# Patient Record
Sex: FEMALE | Race: WHITE | Employment: OTHER | URBAN - METROPOLITAN AREA
[De-identification: names, ages, dates, MRNs, and addresses within clinical notes are randomized per-mention and may not be internally consistent; named-entity substitution may affect disease eponyms.]

---

## 2017-07-21 ENCOUNTER — HOSPITAL ENCOUNTER (OUTPATIENT)
Dept: MAMMOGRAPHY | Age: 52
Discharge: HOME | End: 2017-07-21
Admitting: INTERNAL MEDICINE

## 2017-07-21 DIAGNOSIS — Z12.31 VISIT FOR SCREENING MAMMOGRAM: ICD-10-CM

## 2017-11-07 ENCOUNTER — OFFICE VISIT (OUTPATIENT)
Dept: ORTHOPEDIC SURGERY | Age: 52
End: 2017-11-07

## 2017-11-07 VITALS
HEIGHT: 67 IN | HEART RATE: 69 BPM | SYSTOLIC BLOOD PRESSURE: 138 MMHG | BODY MASS INDEX: 26.37 KG/M2 | WEIGHT: 168 LBS | DIASTOLIC BLOOD PRESSURE: 89 MMHG

## 2017-11-07 DIAGNOSIS — M25.511 ACUTE PAIN OF RIGHT SHOULDER: Primary | ICD-10-CM

## 2017-11-07 DIAGNOSIS — M19.011 PRIMARY OSTEOARTHRITIS OF RIGHT SHOULDER: ICD-10-CM

## 2017-11-07 PROCEDURE — 99203 OFFICE O/P NEW LOW 30 MIN: CPT | Performed by: ORTHOPAEDIC SURGERY

## 2017-11-07 RX ORDER — MELOXICAM 15 MG/1
15 TABLET ORAL DAILY
Qty: 30 TABLET | Refills: 2 | Status: SHIPPED | OUTPATIENT
Start: 2017-11-07 | End: 2020-02-25

## 2017-11-07 NOTE — LETTER
Shoulder Elbow Rehabilitation Referral    Patient Name: Anamika Edge      YOB: 1965    Diagnosis: right shoulder osteoarthritis      Precautions: none       Continuous passive motion (CPM)   Elbow range of motion   Exercise in plane of scapula    Strengthening      Pulley and instruction    Home exercise program (copy to patient)    Sling when arm at risk   Sling or brace at all times    AAROM: Forward elevation to             AAROM: External rotation  To      Isometric external rotator strengthening  AAROM: internal rotation: up the back   Isometric abductor strengthening   AAROM: Internal abduction      Isometric internal rotator strengthening  AAROM: cross-body adduction             Stretching:     Strengthening:   Four quadrant (FE, ER, IR, CBA)   Sleeper stretch     Rotator cuff (ER, IR, Abd)   Forward Elevation     External Rotators      External Rotation     Internal Rotators   Internal Rotation: up/back    Abductors      Internal Rotation: supine in abduction  Flexors   Cross-body abduction     Extensors   Pendulum (FE, Abd/Add, cw/ccw)   Scapular Stabilizers    Wall-walking (FE, Abd)     Shoulder shrugs      Table slides       Rhomboid pinch   Elbow (flex, ext, pron, sup)     Lat. Pull downs      Medial epicondylitis program    Forward punch    Lateral epicondylitis program    Internal rotators      Progressive resistive exercises   Bench Press         Bench press plus  Activities:      Lateral pull-downs   Rowing      Progressive two-hand supine press   Stepper/Exercise bike    Biceps: curls/supination   Swimming   Water exercises    Modalities: PRN    Return to Sport:   Ultrasound      Plyometrics   Iontophoresis     Rhythmic stabilization   Moist heat      Core strengthening    Massage      Sports specific program:       Cryotherapy       Electrical stimulation      Paraffin   Whirlpool   TENS     Home exercise program (copy to patient).       Supervised physical therapy

## 2017-11-07 NOTE — PROGRESS NOTES
Date:  2017    Name:  Hellen Sterling  Address:  [address] [address]    :  1965      Age:   46 y.o.    SSN:  [id number]      Medical Record Number:  [id number]    Reason for Visit:    Chief Complaint    Shoulder Pain (OP/NP right shoulder)      DOS:2017     HPI: Lucia Diaz is a 46 y.o. female here today for evaluation of her right shoulder. She has a 3 month history of right shoulder pain. She denies any injury. At rest her pain is 0/10 on the pain scale and with activity is a 4/10 on the pain scale especially with internal rotation. She has seen Dr. Bhavna Ferrell in the past for right shoulder pain and reports that the symptoms she feels today are similar to her previous shoulder symptoms. In  she underwent a partial thyroidectomy. Pain Assessment  Location of Pain: Shoulder  Location Modifiers: Right  Severity of Pain: 3  Quality of Pain: Throbbing, Sharp, Dull, Aching  Duration of Pain: Persistent  Frequency of Pain: Intermittent  Aggravating Factors: Other (Comment) (lifting arm)  Limiting Behavior: Yes  Relieving Factors: Ice  Result of Injury: No  Work-Related Injury: No  Are there other pain locations you wish to document?: No  ROS: All systems reviewed on patient intake form. Pertinent items are noted in HPI.         Past Medical History:   Diagnosis Date    Cancer ([address]     Osteoarthritis         Past Surgical History:   Procedure Laterality Date    JOINT REPLACEMENT Left     partial knee    KNEE SURGERY      SKIN CANCER EXCISION      THYROID SURGERY         Family History   Problem Relation Age of Onset    Cancer Father        Social History     Social History    Marital status: Single     Spouse name: N/A    Number of children: N/A    Years of education: N/A     Social History Main Topics    Smoking status: Never Smoker    Smokeless tobacco: Never Used    Alcohol use Yes    Drug use: No    Sexual activity: Not Asked     Other Topics Concern  None     Social History Narrative    None       Current Outpatient Prescriptions   Medication Sig Dispense Refill    meloxicam (MOBIC) 15 MG tablet Take 1 tablet by mouth daily 1 po qd prn pain 30 tablet 2     No current facility-administered medications for this visit. No Known Allergies    Vital signs:  /89   Pulse 69   Ht 5' 7\" (1.702 m)   Wt 168 lb (76.2 kg)   BMI 26.31 kg/m²        Neuro: Alert & oriented x 3,  normal,  no focal deficits noted. Normal affect. Eyes: sclera clear  Ears: Normal external ear  Mouth:  No perioral lesions  Pulm: Respirations unlabored and regular  Pulse: Regular rate    Skin: Warm, well perfused        Right shoulder exam    Inspection:  No gross deformities    Palpation: Tenderness over rotator cuff footprint. No significant tenderness overlying the bicipital groove, posterior capsule, or AC joint    Active ROM: 135 forward flexion, 120 abduction, symmetric bilaterally external rotation with the elbow at the side, and internal rotation slightly less than her left shoulder and with pain anteriorly. Passive ROM: Forward elevation trending tight to approximately 150°. Abduction trending tight to approximately 135°. Strength: 5+/5 resisted external rotation with the elbow at the side, 5+/5 resisted internal rotation with the elbow at the side    Stability:  no gross instability    Neurovascular: Neurovascularly intact    Special Tests: 20cm cross arm with pain over the anterior shoulder. Positive Beckwith. Positive Neer's. Negative Katty's.           Left comparison shoulder exam    Inspection:  No gross deformities    Palpation: No significant tenderness overlying the rotator cuff footprint, bicipital groove, or AC joint    Active ROM: Full range of motion    Passive ROM: Similar    Strength: 5/5 strength testing in abduction in the scapular plane, 5/5 resisted external rotation with the elbow at the side, 5/5 resisted internal rotation with the elbow at counseling during the appointment was performed between the patient and Dr. Nanda Rankin. 11/7/2017 6:48 PM    This dictation was performed with a verbal recognition program (DRAGON) and it was checked for errors. It is possible that there are still dictated errors within this office note. If so, please bring any errors to my attention for an addendum. All efforts were made to ensure that this office note is accurate.   _________  I, Dr. Tracy Patel, personally performed the services described in this documentation as described by Yasmany Cano PA-C in my presence, and it is both accurate and complete. Jose F Rankin MD, PhD  11/7/2017

## 2017-11-14 ENCOUNTER — EVALUATION (OUTPATIENT)
Dept: PHYSICAL THERAPY | Age: 52
End: 2017-11-14

## 2019-11-05 ENCOUNTER — HOSPITAL ENCOUNTER (OUTPATIENT)
Dept: WOMENS IMAGING | Age: 54
Discharge: HOME | End: 2019-11-05
Payer: COMMERCIAL

## 2019-11-05 DIAGNOSIS — Z12.31 ENCOUNTER FOR SCREENING MAMMOGRAM FOR MALIGNANT NEOPLASM OF BREAST: ICD-10-CM

## 2019-11-05 PROCEDURE — 77067 SCR MAMMO BI INCL CAD: CPT

## 2020-02-25 ENCOUNTER — OFFICE VISIT (OUTPATIENT)
Dept: ORTHOPEDIC SURGERY | Age: 55
End: 2020-02-25
Payer: COMMERCIAL

## 2020-02-25 VITALS
BODY MASS INDEX: 27.47 KG/M2 | DIASTOLIC BLOOD PRESSURE: 88 MMHG | WEIGHT: 175 LBS | HEIGHT: 67 IN | SYSTOLIC BLOOD PRESSURE: 138 MMHG | HEART RATE: 64 BPM

## 2020-02-25 PROCEDURE — 99213 OFFICE O/P EST LOW 20 MIN: CPT | Performed by: ORTHOPAEDIC SURGERY

## 2020-02-25 RX ORDER — DIPHENHYDRAMINE HCL 25 MG
TABLET ORAL
COMMUNITY
End: 2020-05-07 | Stop reason: ALTCHOICE

## 2020-02-25 RX ORDER — CETIRIZINE HYDROCHLORIDE 10 MG/1
10 TABLET ORAL
COMMUNITY
End: 2022-01-27

## 2020-02-25 RX ORDER — EPINEPHRINE 0.3 MG/.3ML
0.3 INJECTION SUBCUTANEOUS
COMMUNITY
Start: 2019-05-21 | End: 2021-05-27

## 2020-02-25 RX ORDER — RANITIDINE 150 MG/1
150 TABLET ORAL
COMMUNITY
End: 2020-02-25

## 2020-02-25 NOTE — PROGRESS NOTES
[facility]  History and Physical  Shoulder Pain    Date:  2020    Name:  Zev Yañez  Address:  [address] [address]    :  1965      Age:   62 y.o.    SSN:        Medical Record Number:  <[id number]>    Reason for Visit:    Shoulder Pain (OP/SP RIGHT SHOULDER)      HPI:   Zev Yañez is a 62 y.o. female who presents to our office today for evaluation of her right shoulder. She saw Dr. Anshul gatica in 2017 for right shoulder osteoarthritis. She states that it is the same pain she has been experiencing, however recently it has progressively gotten worse. Since we saw her last she has been treating her OA with conservative treatment. She was prescribed one prescription of meloxicam back in  and now takes ibuprofen. Both only give temporary relief. In addition, she has been doing physical therapy with a HEP. Her pain is worse with any activity, but specifically overhead movements. She feels as if her pain is now causing her range of motion to decrease. At this point she feels her pain and loss of motion are effecting her quality of life and ability to partake in day to day activites. She would like to explore the next step in treatment. She endorses cracking. Denies numbness, tingling or new injury. Pain Assessment  Location of Pain: Shoulder  Location Modifiers: Right  Severity of Pain: 6  Quality of Pain: Aching  Duration of Pain: Persistent  Frequency of Pain: Constant  Aggravating Factors: (gen use)  Limiting Behavior: Some  Relieving Factors: Ice  Result of Injury: No  Work-Related Injury: No  Are there other pain locations you wish to document?: No    Musculoskeletal ROS: negative for - pain in right shoulder      Review of Systems:  A 14 point review of systems available in the scanned medical record as documented by the patient on 2020.   The review is negative with the exception of those things mentioned in the History of Present Illness and Past Medical History. Past Medical History:  Patient's medications, allergies, past medical, surgical, social and family histories were reviewed and updated as appropriate. Allergies:  No Known Allergies    Physical Exam:  Vitals:    02/25/20 1316   BP: 138/88   Pulse: 64     General: Bret Saldana is a healthy and well appearing 62 y.o. female who is sitting comfortably in our office in acute distress. Skin:  There are no skin lesions, cellulitis, or extreme edema. The patient has warm and well-perfused Bilateral upper extremities with brisk capillary refill. Eyes: Extra-ocular muscles intact  Mouth: Oral mucosa moist. No perioral lesions  Pulm: Respirations unlabored and regular. Neuro: Alert and oriented x3    Right Shoulder Exam:  Inspection: No gross deformities, no signs of infection. Palpation:  Tenderness over rotator cuff footprint. No significant tenderness overlying the bicipital groove, posterior capsule, or AC joint    Active Range of Motion: Forward elevation of 110 compared to 170 on the left, external rotation with elbow at the side 50 bilaterally, internal rotation to the back is L3 compared to T7 on the left    Passive Range of Motion: Passively forward elevation can be further increased to 120. Strength: External rotation with resistance with elbow at the side 5/5, internal rotation with resistance with elbow at the side 5/5      Neurovascular: Sensation to light touch is intact, no motor deficits, palpable radial pulses 2+    Additional Examinations:    Examination of the contralateral extremity does not show any tenderness, deformity or injury. There is no gross instability. There are no rashes, ulcerations or lesions.  Strength and tone are normal.      Radiographic:  3 xray views of the right  shoulder including True AP in internal and external and axillary lateral were taken in our office today reveal no fractures, dislocations, visible tumors, or signs of acute trauma. Radiographic Impression: Increased size of osteophytic lesion on the proximal humerus. Glenohumeral joint narrowing. Diffuse degenerative changes of the humeral head. Assessment:  Randi Tony is a 62 y.o. female with right shoulder glenohumeral osteoarthritis. Her symptoms have not been responding to initial conservative management. Impression:  Encounter Diagnoses   Name Primary?  Right shoulder pain, unspecified chronicity Yes    Primary osteoarthritis of right shoulder        Office Procedures:  Orders Placed This Encounter   Procedures    XR SHOULDER RIGHT (MIN 2 VIEWS)     Standing Status:   Future     Number of Occurrences:   1     Standing Expiration Date:   2/25/2021     Order Specific Question:   Reason for exam:     Answer:   pain    MRI SHOULDER RIGHT WO CONTRAST     Standing Status:   Future     Standing Expiration Date:   2/25/2021     Order Specific Question:   Reason for exam:     Answer:   MRI Proscan nky       Plan:   Andrea Jade's right glenohumeral arthritis is progressively worsening and no longer responding to conservative management. She voices concerns that it is keeping her from participating in her usual daily activities. This ultimately decreases her quality of life. A corticosteroid injection was discussed as one option for treatment to help decrease inflammation and pain. However, over the past 2 1/2 years the arthritic changes of her glenohumeral joint have progressed greatly, that she is also a candidate for right total shoulder arthroplasty. She was educated that at this point her arthritidis has progressed to the point that arthroscopy would no longer be a beneficial treatment. The patient was educated that replacement surgery would not restrict her from being able to partake in her physical activities such as swimming, tennis or golf.  Due to the amount of discomfort and limitations her arthritis causes she would like to proceed with surgical treatment. 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 Prior to surgery she will get an MRI of her right shoulder to further evaluate that state of the rotator cuff and the remaining joint. Risks, benefits and potential complications of right total shoulder arthroplasty surgery were discussed with the patient. Risks discussed include but are not limited to bleeding, infection, anesthetic risk, injury to nerves and blood vessels, deep vein thrombosis, residual stiffness and weakness, and the need for revision surgery. The patient also understands that anesthetic risks include cardiopulmonary issues, drug reactions and even death. The patient voices an understanding of the importance of physical therapy and home exercises after surgery. All questions were answered and written informed consent for surgery was obtained today. 2:50 PM    BALWINDER Rothman  Student Physician Assistant    During this examination, IKirsten PA-S, functioned as a scribe for Dr. Robyn Manuel. This dictation was performed with a verbal recognition program (DRAGON) and it was checked for errors. It is possible that there are still dictated errors within this office note. If so, please bring any errors to my attention for an addendum. All efforts were made to ensure that this office note is accurate.  _______________  I, Dr. Robyn Manuel, personally performed the services described in this documentation as described by BALWINDER Rothman in my presence, and it is both accurate and complete. Jose F Doan MD, PhD  2/25/2020

## 2020-02-26 ENCOUNTER — TELEPHONE (OUTPATIENT)
Dept: ORTHOPEDIC SURGERY | Age: 55
End: 2020-02-26

## 2020-02-26 NOTE — TELEPHONE ENCOUNTER
PT WOULD LIKE TO SPEAK TO EITHER HEMANT OR Tirso Little [address] [facility]  [phone]

## 2020-02-28 ENCOUNTER — TELEPHONE (OUTPATIENT)
Dept: ORTHOPEDIC SURGERY | Age: 55
End: 2020-02-28

## 2020-03-03 ENCOUNTER — OFFICE VISIT (OUTPATIENT)
Dept: ORTHOPEDIC SURGERY | Age: 55
End: 2020-03-03
Payer: COMMERCIAL

## 2020-03-03 VITALS
HEIGHT: 67 IN | BODY MASS INDEX: 27.47 KG/M2 | WEIGHT: 175.04 LBS | DIASTOLIC BLOOD PRESSURE: 88 MMHG | HEART RATE: 65 BPM | SYSTOLIC BLOOD PRESSURE: 142 MMHG

## 2020-03-03 PROCEDURE — 99213 OFFICE O/P EST LOW 20 MIN: CPT | Performed by: ORTHOPAEDIC SURGERY

## 2020-03-03 PROCEDURE — L3670 SO ACRO/CLAV CAN WEB PRE OTS: HCPCS | Performed by: ORTHOPAEDIC SURGERY

## 2020-03-03 NOTE — LETTER
[facility]  Surgery Precert & Billing Form:    DEMOGRAPHICS:                                                                                                       Patient Name:  Donna Smith  Patient :  1965   Patient SS#:      Patient Phone:  [phone] (home)  Alt. Patient Phone:    Patient Address:  Terell Yañez [address] [place] [id number]    PCP:  Zully Green MD  Insurance:  [employer]     DIAGNOSIS & PROCEDURE:                                                                                      Diagnosis:   Primary osteoarthritis of right shoulder  - Primary M19.011    Right shoulder pain, unspecified chronicity  M25.511        Operation: right    SURGERY  INFORMATION  Date of Surgery:   20  Location:    [facility]   Type:    Inpatient  23 hour hold:  No  Surgeon:        Jeromy Bernstein.  Cecile Cano MD         3/3/20     BILLING INFORMATION:                                                                                                Physician Procedure                                            CPT Codes                      PA, or Fellow Procedure                                      CPT Codes                      Precert information:

## 2020-03-03 NOTE — PROGRESS NOTES
12 [facility]  History and Physical  Shoulder Pain    Date:  3/3/2020    Name:  Deneen Ochoa  Address:  [place] [address] [address]    :  1965      Age:   62 y.o.    SSN:  [id number]      Medical Record Number:  <[id number]>    Reason for Visit:    Pre-op Exam (Right Shoulder - SX scheduled for 3/30/20)      HPI:   Deneen Ochoa is a 62 y.o. female who presents to our office today for follow up of the right shoulder pain. She has well known glenohumeral osteoarthritis of the right shoulder and has failed conservative management. She continues to have persistent symptoms that is affecting her overall quality of life. She is experiencing grinding and an achy pain. It also disrupts her sleep as well. She denies any new injury since her last visit. Pain Assessment  Location of Pain: Shoulder  Location Modifiers: Right  Severity of Pain: 6  Quality of Pain: Aching  Duration of Pain: Persistent  Frequency of Pain: Intermittent  Aggravating Factors: (certain movements, overhead activities)  Limiting Behavior: Yes  Relieving Factors: Rest, Ice  Work-Related Injury: No  Are there other pain locations you wish to document?: No    Review of Systems    Patient has had no medical changes since last evaluated           Review of Systems:  A 14 point review of systems available in the scanned medical record as documented by the patient. The review is negative with the exception of those things mentioned in the History of Present Illness and Past Medical History. Past Medical History:  Patient's medications, allergies, past medical, surgical, social and family histories were reviewed and updated as appropriate.     Allergies:  No Known Allergies    Physical Exam:  Vitals:    20 1557   BP: (!) 142/88   Pulse: 65     General: Deneen Ochoa is a healthy and well appearing 62 y.o. female who is sitting comfortably in our office in acute immobilization from this orthosis. The orthosis will assist in protecting the affected area, provide functional support and facilitate healing. The device was ordered and fit on 03/03/2019 . The patient was educated and fit by a healthcare professional with expert knowledge and specialization in brace application while under the direct supervision of the treating physician. Verbal and written instructions for the use of and application of this item were provided. They were instructed to contact the office immediately should the brace result in increased pain, decreased sensation, increased swelling or worsening of the condition. Plan:   We do feel that she is an excellent candidate for a right anatomic shoulder arthroplasty. Risks, benefits and potential complications of total shoulder arthroplasty surgery were discussed with the patient. Risks discussed include but are not limited to bleeding, infection, anesthetic risk, injury to nerves and blood vessels, deep vein thrombosis, residual stiffness and weakness, and the need for revision surgery. The patient also understands that anesthetic risks include cardiopulmonary issues, drug reactions and even death. The patient voices an understanding of the importance of physical therapy and home exercises after surgery. All questions were answered and written informed consent for surgery was obtained today. We will fit her with an Ultrasling today. We will want her to have an MRI of the right shoulder for surgical planning. She will also have to meet with her PCP for surgical clearance. All her questions were fully answered and we will see her at surgery. 3/3/2020  4:17 PM      Lucrecia Sanchez PA-C  Orthopaedic Sports Medicine Physician Assistant    During this examination, Lucrecia ZIMMERMAN PA-C, functioned as a scribe for Dr. Nini Lechuga. This dictation was performed with a verbal recognition program (DRAGON) and it was checked for errors.

## 2020-03-16 ENCOUNTER — TELEPHONE (OUTPATIENT)
Dept: ORTHOPEDIC SURGERY | Age: 55
End: 2020-03-16

## 2020-03-17 ENCOUNTER — TELEPHONE (OUTPATIENT)
Dept: ORTHOPEDIC SURGERY | Age: 55
End: 2020-03-17

## 2020-04-01 ENCOUNTER — TELEPHONE (OUTPATIENT)
Dept: ORTHOPEDIC SURGERY | Age: 55
End: 2020-04-01

## 2020-04-15 ENCOUNTER — TELEPHONE (OUTPATIENT)
Dept: ORTHOPEDIC SURGERY | Age: 55
End: 2020-04-15

## 2020-05-07 RX ORDER — FAMOTIDINE 20 MG/1
20 TABLET, FILM COATED ORAL DAILY
COMMUNITY
End: 2022-01-27

## 2020-05-11 ENCOUNTER — TELEPHONE (OUTPATIENT)
Dept: ORTHOPEDIC SURGERY | Age: 55
End: 2020-05-11

## 2020-05-13 ENCOUNTER — OFFICE VISIT (OUTPATIENT)
Dept: ORTHOPEDIC SURGERY | Age: 55
End: 2020-05-13
Payer: COMMERCIAL

## 2020-05-13 VITALS
HEART RATE: 95 BPM | BODY MASS INDEX: 28.24 KG/M2 | WEIGHT: 179.9 LBS | TEMPERATURE: 99.2 F | SYSTOLIC BLOOD PRESSURE: 117 MMHG | HEIGHT: 67 IN | DIASTOLIC BLOOD PRESSURE: 88 MMHG

## 2020-05-13 PROCEDURE — 99214 OFFICE O/P EST MOD 30 MIN: CPT | Performed by: ORTHOPAEDIC SURGERY

## 2020-05-15 ENCOUNTER — HOSPITAL ENCOUNTER (OUTPATIENT)
Dept: PREADMISSION TESTING | Age: 55
Discharge: HOME | End: 2020-05-19
Payer: COMMERCIAL

## 2020-05-15 ENCOUNTER — OFFICE VISIT (OUTPATIENT)
Dept: PRIMARY CARE CLINIC | Age: 55
End: 2020-05-15

## 2020-05-15 LAB
ABO/RH: NORMAL
ANION GAP SERPL CALCULATED.3IONS-SCNC: 9 MMOL/L (ref 3–16)
ANTIBODY SCREEN: NORMAL
APTT: 31.8 SEC (ref 24.2–36.2)
BACTERIA: ABNORMAL /HPF
BASOPHILS ABSOLUTE: 0 K/UL (ref 0–0.2)
BASOPHILS RELATIVE PERCENT: 0.8 %
BILIRUBIN URINE: NEGATIVE
BLOOD, URINE: ABNORMAL
BUN BLDV-MCNC: 9 MG/DL (ref 7–20)
CALCIUM SERPL-MCNC: 9.2 MG/DL (ref 8.3–10.6)
CHLORIDE BLD-SCNC: 103 MMOL/L (ref 99–110)
CLARITY: CLEAR
CO2: 27 MMOL/L (ref 21–32)
COLOR: YELLOW
CREAT SERPL-MCNC: 0.6 MG/DL (ref 0.6–1.1)
EOSINOPHILS ABSOLUTE: 0.1 K/UL (ref 0–0.6)
EOSINOPHILS RELATIVE PERCENT: 1.2 %
EPITHELIAL CELLS, UA: ABNORMAL /HPF (ref 0–5)
GFR AFRICAN AMERICAN: >60
GFR NON-AFRICAN AMERICAN: >60
GLUCOSE BLD-MCNC: 87 MG/DL (ref 70–99)
GLUCOSE URINE: NEGATIVE MG/DL
HCT VFR BLD CALC: 41.5 % (ref 36–48)
HEMOGLOBIN: 13.5 G/DL (ref 12–16)
INR BLD: 0.98 (ref 0.86–1.14)
KETONES, URINE: NEGATIVE MG/DL
LEUKOCYTE ESTERASE, URINE: NEGATIVE
LYMPHOCYTES ABSOLUTE: 1.2 K/UL (ref 1–5.1)
LYMPHOCYTES RELATIVE PERCENT: 23.3 %
MCH RBC QN AUTO: 30.6 PG (ref 26–34)
MCHC RBC AUTO-ENTMCNC: 32.5 G/DL (ref 31–36)
MCV RBC AUTO: 94.1 FL (ref 80–100)
MICROSCOPIC EXAMINATION: YES
MONOCYTES ABSOLUTE: 0.3 K/UL (ref 0–1.3)
MONOCYTES RELATIVE PERCENT: 5.2 %
NEUTROPHILS ABSOLUTE: 3.7 K/UL (ref 1.7–7.7)
NEUTROPHILS RELATIVE PERCENT: 69.5 %
NITRITE, URINE: NEGATIVE
PDW BLD-RTO: 13.4 % (ref 12.4–15.4)
PH UA: 7 (ref 5–8)
PLATELET # BLD: 242 K/UL (ref 135–450)
PMV BLD AUTO: 8.6 FL (ref 5–10.5)
POTASSIUM SERPL-SCNC: 3.8 MMOL/L (ref 3.5–5.1)
PROTEIN UA: NEGATIVE MG/DL
PROTHROMBIN TIME: 11.4 SEC (ref 10–13.2)
RBC # BLD: 4.41 M/UL (ref 4–5.2)
RBC UA: ABNORMAL /HPF (ref 0–4)
SODIUM BLD-SCNC: 139 MMOL/L (ref 136–145)
SPECIFIC GRAVITY UA: <=1.005 (ref 1–1.03)
URINE TYPE: ABNORMAL
UROBILINOGEN, URINE: 0.2 E.U./DL
WBC # BLD: 5.3 K/UL (ref 4–11)
WBC UA: ABNORMAL /HPF (ref 0–5)

## 2020-05-15 PROCEDURE — 86900 BLOOD TYPING SEROLOGIC ABO: CPT

## 2020-05-15 PROCEDURE — 85610 PROTHROMBIN TIME: CPT

## 2020-05-15 PROCEDURE — 83036 HEMOGLOBIN GLYCOSYLATED A1C: CPT

## 2020-05-15 PROCEDURE — 86850 RBC ANTIBODY SCREEN: CPT

## 2020-05-15 PROCEDURE — 81001 URINALYSIS AUTO W/SCOPE: CPT

## 2020-05-15 PROCEDURE — 85025 COMPLETE CBC W/AUTO DIFF WBC: CPT

## 2020-05-15 PROCEDURE — 87086 URINE CULTURE/COLONY COUNT: CPT

## 2020-05-15 PROCEDURE — 85730 THROMBOPLASTIN TIME PARTIAL: CPT

## 2020-05-15 PROCEDURE — 80048 BASIC METABOLIC PNL TOTAL CA: CPT

## 2020-05-15 PROCEDURE — 86901 BLOOD TYPING SEROLOGIC RH(D): CPT

## 2020-05-15 PROCEDURE — 87081 CULTURE SCREEN ONLY: CPT

## 2020-05-15 NOTE — PROGRESS NOTES
[facility] PRE-SURGICAL TESTING INSTRUCTIONS                              PRIOR TO PROCEDURE DATE:  1. Please follow any guidelines/instructions prior to your procedure as advised by your surgeon. 2. Arrange for someone to drive you home and be with you for the first 24 hours after discharge for your safety after your procedure for which you received sedation. Ensure it is someone we can share information with regarding your discharge. 3. You must contact your surgeon for instructions IF:   You are taking any blood thinners, aspirin, anti-inflammatory or vitamin E.   There is a change in your physical condition such as a cold, fever, rash, cuts, sores or any other infection, especially near your surgical site. 4. Do not drink alcohol the day before or day of your procedure. 5. A Pre-op History and Physical for surgery MUST be completed by your Physician or Urgent Care within 30 days of your procedure date. Please bring a copy with you on the day of your procedure and along with any other testing performed. THE DAY OF YOUR PROCEDURE:  1. Follow instructions for ARRIVAL TIME as DIRECTED BY YOUR SURGEON. I    2. Enter the MAIN entrance from [employer] and follow the signs to the free [employer] or [employer] parking (offered free of charge 6am-5pm). 3. Enter the Main Entrance of the hospital (do not enter from the lower level of the parking garage). Upon entrance, check in with the  at the main desk on your left. If no one is available at the desk, proceed into the [facility] Waiting Room and go through the door directly into the [facility]. There is a Check-in desk ACROSS from Room 5 (marked with a sign hanging from the ceiling). The phone number for the surgery center is [phone]. 4. Please call [phone] option #2 option #2 if you have not been preregistered yet. On the day of your procedure bring your insurance card and photo ID.  You will be

## 2020-05-16 LAB
ESTIMATED AVERAGE GLUCOSE: 99.7 MG/DL
HBA1C MFR BLD: 5.1 %
URINE CULTURE, ROUTINE: NORMAL

## 2020-05-17 LAB — MRSA CULTURE ONLY: NORMAL

## 2020-05-18 ENCOUNTER — ANESTHESIA EVENT (OUTPATIENT)
Dept: OPERATING ROOM | Age: 55
End: 2020-05-18
Payer: COMMERCIAL

## 2020-05-19 ENCOUNTER — ANESTHESIA (OUTPATIENT)
Dept: OPERATING ROOM | Age: 55
End: 2020-05-19
Payer: COMMERCIAL

## 2020-05-19 ENCOUNTER — APPOINTMENT (OUTPATIENT)
Dept: GENERAL RADIOLOGY | Age: 55
End: 2020-05-19
Attending: ORTHOPAEDIC SURGERY
Payer: COMMERCIAL

## 2020-05-19 ENCOUNTER — TELEPHONE (OUTPATIENT)
Dept: ORTHOPEDIC SURGERY | Age: 55
End: 2020-05-19

## 2020-05-19 ENCOUNTER — HOSPITAL ENCOUNTER (OUTPATIENT)
Age: 55
Setting detail: SURGERY ADMIT
Discharge: HOME | End: 2020-05-19
Attending: ORTHOPAEDIC SURGERY | Admitting: ORTHOPAEDIC SURGERY
Payer: COMMERCIAL

## 2020-05-19 VITALS
OXYGEN SATURATION: 95 % | HEIGHT: 67 IN | WEIGHT: 180 LBS | RESPIRATION RATE: 18 BRPM | BODY MASS INDEX: 28.25 KG/M2 | SYSTOLIC BLOOD PRESSURE: 130 MMHG | HEART RATE: 81 BPM | DIASTOLIC BLOOD PRESSURE: 65 MMHG | TEMPERATURE: 98.4 F

## 2020-05-19 VITALS — OXYGEN SATURATION: 98 % | DIASTOLIC BLOOD PRESSURE: 71 MMHG | TEMPERATURE: 92.5 F | SYSTOLIC BLOOD PRESSURE: 115 MMHG

## 2020-05-19 LAB
ABO/RH: NORMAL
ANTIBODY SCREEN: NORMAL
GLUCOSE BLD-MCNC: 91 MG/DL (ref 70–99)
PERFORMED ON: NORMAL
SARS-COV-2, NAAT: NOT DETECTED

## 2020-05-19 PROCEDURE — C1713 ANCHOR/SCREW BN/BN,TIS/BN: HCPCS | Performed by: ORTHOPAEDIC SURGERY

## 2020-05-19 PROCEDURE — 6360000002 HC RX W HCPCS: Performed by: ORTHOPAEDIC SURGERY

## 2020-05-19 PROCEDURE — 3600000004 HC SURGERY LEVEL 4 BASE: Performed by: ORTHOPAEDIC SURGERY

## 2020-05-19 PROCEDURE — U0002 COVID-19 LAB TEST NON-CDC: HCPCS

## 2020-05-19 PROCEDURE — 86901 BLOOD TYPING SEROLOGIC RH(D): CPT

## 2020-05-19 PROCEDURE — 86900 BLOOD TYPING SEROLOGIC ABO: CPT

## 2020-05-19 PROCEDURE — 6360000002 HC RX W HCPCS: Performed by: NURSE ANESTHETIST, CERTIFIED REGISTERED

## 2020-05-19 PROCEDURE — 73020 X-RAY EXAM OF SHOULDER: CPT

## 2020-05-19 PROCEDURE — 2580000003 HC RX 258: Performed by: ORTHOPAEDIC SURGERY

## 2020-05-19 PROCEDURE — 3700000001 HC ADD 15 MINUTES (ANESTHESIA): Performed by: ORTHOPAEDIC SURGERY

## 2020-05-19 PROCEDURE — 2500000003 HC RX 250 WO HCPCS: Performed by: ANESTHESIOLOGY

## 2020-05-19 PROCEDURE — 86850 RBC ANTIBODY SCREEN: CPT

## 2020-05-19 PROCEDURE — 3700000000 HC ANESTHESIA ATTENDED CARE: Performed by: ORTHOPAEDIC SURGERY

## 2020-05-19 PROCEDURE — 7100000001 HC PACU RECOVERY - ADDTL 15 MIN: Performed by: ORTHOPAEDIC SURGERY

## 2020-05-19 PROCEDURE — 7100000000 HC PACU RECOVERY - FIRST 15 MIN: Performed by: ORTHOPAEDIC SURGERY

## 2020-05-19 PROCEDURE — 3600000014 HC SURGERY LEVEL 4 ADDTL 15MIN: Performed by: ORTHOPAEDIC SURGERY

## 2020-05-19 PROCEDURE — C1776 JOINT DEVICE (IMPLANTABLE): HCPCS | Performed by: ORTHOPAEDIC SURGERY

## 2020-05-19 PROCEDURE — 2500000003 HC RX 250 WO HCPCS: Performed by: NURSE ANESTHETIST, CERTIFIED REGISTERED

## 2020-05-19 PROCEDURE — 64415 NJX AA&/STRD BRCH PLXS IMG: CPT | Performed by: ANESTHESIOLOGY

## 2020-05-19 PROCEDURE — 7100000011 HC PHASE II RECOVERY - ADDTL 15 MIN: Performed by: ORTHOPAEDIC SURGERY

## 2020-05-19 PROCEDURE — 2709999900 HC NON-CHARGEABLE SUPPLY: Performed by: ORTHOPAEDIC SURGERY

## 2020-05-19 PROCEDURE — 6360000002 HC RX W HCPCS: Performed by: ANESTHESIOLOGY

## 2020-05-19 PROCEDURE — 7100000010 HC PHASE II RECOVERY - FIRST 15 MIN: Performed by: ORTHOPAEDIC SURGERY

## 2020-05-19 PROCEDURE — C9290 INJ, BUPIVACAINE LIPOSOME: HCPCS | Performed by: ANESTHESIOLOGY

## 2020-05-19 DEVICE — COMPONENT TOT SHLDR CAPPED STD S3 MOD TURON: Type: IMPLANTABLE DEVICE | Site: SHOULDER | Status: FUNCTIONAL

## 2020-05-19 DEVICE — Z DUP USE 2635021 COMPONENT GLEN FIX SZ 46 ALL POLY SHLDR PEGGED ANAT E PLUS: Type: IMPLANTABLE DEVICE | Site: SHOULDER | Status: FUNCTIONAL

## 2020-05-19 DEVICE — IMPLANTABLE DEVICE: Type: IMPLANTABLE DEVICE | Site: SHOULDER | Status: FUNCTIONAL

## 2020-05-19 DEVICE — COBALT G-HV BONE CEMENT 40GM
Type: IMPLANTABLE DEVICE | Site: SHOULDER | Status: FUNCTIONAL
Brand: DJO SURGICAL

## 2020-05-19 RX ORDER — MIDAZOLAM HYDROCHLORIDE 1 MG/ML
INJECTION INTRAMUSCULAR; INTRAVENOUS
Status: COMPLETED
Start: 2020-05-19 | End: 2020-05-19

## 2020-05-19 RX ORDER — EPHEDRINE SULFATE 50 MG/ML
INJECTION, SOLUTION INTRAVENOUS PRN
Status: DISCONTINUED | OUTPATIENT
Start: 2020-05-19 | End: 2020-05-19 | Stop reason: SDUPTHER

## 2020-05-19 RX ORDER — ONDANSETRON 2 MG/ML
INJECTION INTRAMUSCULAR; INTRAVENOUS PRN
Status: DISCONTINUED | OUTPATIENT
Start: 2020-05-19 | End: 2020-05-19 | Stop reason: SDUPTHER

## 2020-05-19 RX ORDER — SODIUM CHLORIDE, SODIUM LACTATE, POTASSIUM CHLORIDE, CALCIUM CHLORIDE 600; 310; 30; 20 MG/100ML; MG/100ML; MG/100ML; MG/100ML
INJECTION, SOLUTION INTRAVENOUS CONTINUOUS
Status: DISCONTINUED | OUTPATIENT
Start: 2020-05-19 | End: 2020-05-19 | Stop reason: HOSPADM

## 2020-05-19 RX ORDER — BUPIVACAINE HYDROCHLORIDE 5 MG/ML
INJECTION, SOLUTION EPIDURAL; INTRACAUDAL
Status: COMPLETED | OUTPATIENT
Start: 2020-05-19 | End: 2020-05-19

## 2020-05-19 RX ORDER — SODIUM CHLORIDE, SODIUM LACTATE, POTASSIUM CHLORIDE, CALCIUM CHLORIDE 600; 310; 30; 20 MG/100ML; MG/100ML; MG/100ML; MG/100ML
20 INJECTION, SOLUTION INTRAVENOUS CONTINUOUS
Status: DISCONTINUED | OUTPATIENT
Start: 2020-05-19 | End: 2020-05-19 | Stop reason: HOSPADM

## 2020-05-19 RX ORDER — VANCOMYCIN HYDROCHLORIDE 1 G/20ML
INJECTION, POWDER, LYOPHILIZED, FOR SOLUTION INTRAVENOUS PRN
Status: DISCONTINUED | OUTPATIENT
Start: 2020-05-19 | End: 2020-05-19 | Stop reason: ALTCHOICE

## 2020-05-19 RX ORDER — MIDAZOLAM HYDROCHLORIDE 1 MG/ML
INJECTION INTRAMUSCULAR; INTRAVENOUS PRN
Status: DISCONTINUED | OUTPATIENT
Start: 2020-05-19 | End: 2020-05-19 | Stop reason: SDUPTHER

## 2020-05-19 RX ORDER — ASPIRIN 81 MG/1
81 TABLET ORAL 2 TIMES DAILY
Qty: 28 TABLET | Refills: 0 | Status: SHIPPED | OUTPATIENT
Start: 2020-05-19 | End: 2020-06-09

## 2020-05-19 RX ORDER — ONDANSETRON 4 MG/1
4 TABLET, FILM COATED ORAL 3 TIMES DAILY PRN
Qty: 8 TABLET | Refills: 0 | Status: SHIPPED | OUTPATIENT
Start: 2020-05-19 | End: 2020-06-09

## 2020-05-19 RX ORDER — GLYCOPYRROLATE 1 MG/5 ML
SYRINGE (ML) INTRAVENOUS PRN
Status: DISCONTINUED | OUTPATIENT
Start: 2020-05-19 | End: 2020-05-19 | Stop reason: SDUPTHER

## 2020-05-19 RX ORDER — BUPIVACAINE HYDROCHLORIDE 5 MG/ML
INJECTION, SOLUTION EPIDURAL; INTRACAUDAL
Status: COMPLETED
Start: 2020-05-19 | End: 2020-05-19

## 2020-05-19 RX ORDER — FENTANYL CITRATE 50 UG/ML
INJECTION, SOLUTION INTRAMUSCULAR; INTRAVENOUS PRN
Status: DISCONTINUED | OUTPATIENT
Start: 2020-05-19 | End: 2020-05-19 | Stop reason: SDUPTHER

## 2020-05-19 RX ORDER — OXYCODONE HYDROCHLORIDE AND ACETAMINOPHEN 5; 325 MG/1; MG/1
1 TABLET ORAL EVERY 6 HOURS PRN
Qty: 40 TABLET | Refills: 0 | Status: SHIPPED | OUTPATIENT
Start: 2020-05-19 | End: 2020-05-26

## 2020-05-19 RX ORDER — DEXAMETHASONE SODIUM PHOSPHATE 4 MG/ML
INJECTION, SOLUTION INTRA-ARTICULAR; INTRALESIONAL; INTRAMUSCULAR; INTRAVENOUS; SOFT TISSUE PRN
Status: DISCONTINUED | OUTPATIENT
Start: 2020-05-19 | End: 2020-05-19 | Stop reason: SDUPTHER

## 2020-05-19 RX ORDER — LIDOCAINE HYDROCHLORIDE 20 MG/ML
INJECTION, SOLUTION INTRAVENOUS PRN
Status: DISCONTINUED | OUTPATIENT
Start: 2020-05-19 | End: 2020-05-19 | Stop reason: SDUPTHER

## 2020-05-19 RX ORDER — CEPHALEXIN 500 MG/1
500 CAPSULE ORAL 4 TIMES DAILY
Qty: 8 CAPSULE | Refills: 0 | Status: SHIPPED | OUTPATIENT
Start: 2020-05-19 | End: 2020-05-21

## 2020-05-19 RX ORDER — ROCURONIUM BROMIDE 10 MG/ML
INJECTION, SOLUTION INTRAVENOUS PRN
Status: DISCONTINUED | OUTPATIENT
Start: 2020-05-19 | End: 2020-05-19 | Stop reason: SDUPTHER

## 2020-05-19 RX ORDER — PHENYLEPHRINE HYDROCHLORIDE 10 MG/ML
INJECTION INTRAVENOUS PRN
Status: DISCONTINUED | OUTPATIENT
Start: 2020-05-19 | End: 2020-05-19 | Stop reason: SDUPTHER

## 2020-05-19 RX ORDER — FENTANYL CITRATE 50 UG/ML
INJECTION, SOLUTION INTRAMUSCULAR; INTRAVENOUS
Status: COMPLETED
Start: 2020-05-19 | End: 2020-05-19

## 2020-05-19 RX ORDER — PROPOFOL 10 MG/ML
INJECTION, EMULSION INTRAVENOUS PRN
Status: DISCONTINUED | OUTPATIENT
Start: 2020-05-19 | End: 2020-05-19 | Stop reason: SDUPTHER

## 2020-05-19 RX ADMIN — BUPIVACAINE 20 ML: 13.3 INJECTION, SUSPENSION, LIPOSOMAL INFILTRATION at 07:13

## 2020-05-19 RX ADMIN — EPHEDRINE SULFATE 10 MG: 50 INJECTION, SOLUTION INTRAMUSCULAR; INTRAVENOUS; SUBCUTANEOUS at 07:52

## 2020-05-19 RX ADMIN — SODIUM CHLORIDE, SODIUM LACTATE, POTASSIUM CHLORIDE, AND CALCIUM CHLORIDE: 600; 310; 30; 20 INJECTION, SOLUTION INTRAVENOUS at 09:39

## 2020-05-19 RX ADMIN — EPHEDRINE SULFATE 5 MG: 50 INJECTION, SOLUTION INTRAMUSCULAR; INTRAVENOUS; SUBCUTANEOUS at 08:45

## 2020-05-19 RX ADMIN — PHENYLEPHRINE HYDROCHLORIDE 100 MCG: 10 INJECTION INTRAVENOUS at 08:36

## 2020-05-19 RX ADMIN — SUGAMMADEX 100 MG: 100 INJECTION, SOLUTION INTRAVENOUS at 10:24

## 2020-05-19 RX ADMIN — SODIUM CHLORIDE, SODIUM LACTATE, POTASSIUM CHLORIDE, AND CALCIUM CHLORIDE: 600; 310; 30; 20 INJECTION, SOLUTION INTRAVENOUS at 07:23

## 2020-05-19 RX ADMIN — Medication 0.8 MG: at 10:21

## 2020-05-19 RX ADMIN — SODIUM CHLORIDE, SODIUM LACTATE, POTASSIUM CHLORIDE, AND CALCIUM CHLORIDE 20 ML/HR: 600; 310; 30; 20 INJECTION, SOLUTION INTRAVENOUS at 06:42

## 2020-05-19 RX ADMIN — ROCURONIUM BROMIDE 25 MG: 10 INJECTION, SOLUTION INTRAVENOUS at 08:19

## 2020-05-19 RX ADMIN — EPHEDRINE SULFATE 5 MG: 50 INJECTION, SOLUTION INTRAMUSCULAR; INTRAVENOUS; SUBCUTANEOUS at 09:20

## 2020-05-19 RX ADMIN — MIDAZOLAM HYDROCHLORIDE 2 MG: 2 INJECTION, SOLUTION INTRAMUSCULAR; INTRAVENOUS at 07:10

## 2020-05-19 RX ADMIN — FENTANYL CITRATE 50 MCG: 50 INJECTION INTRAMUSCULAR; INTRAVENOUS at 07:35

## 2020-05-19 RX ADMIN — FENTANYL CITRATE 100 MCG: 50 INJECTION INTRAMUSCULAR; INTRAVENOUS at 07:10

## 2020-05-19 RX ADMIN — Medication 0.2 MG: at 07:48

## 2020-05-19 RX ADMIN — EPHEDRINE SULFATE 5 MG: 50 INJECTION, SOLUTION INTRAMUSCULAR; INTRAVENOUS; SUBCUTANEOUS at 08:57

## 2020-05-19 RX ADMIN — PHENYLEPHRINE HYDROCHLORIDE 50 MCG: 10 INJECTION INTRAVENOUS at 07:45

## 2020-05-19 RX ADMIN — PROPOFOL 200 MG: 10 INJECTION, EMULSION INTRAVENOUS at 07:36

## 2020-05-19 RX ADMIN — VANCOMYCIN HYDROCHLORIDE 1.25 G: 10 INJECTION, POWDER, LYOPHILIZED, FOR SOLUTION INTRAVENOUS at 07:30

## 2020-05-19 RX ADMIN — ROCURONIUM BROMIDE 10 MG: 10 INJECTION, SOLUTION INTRAVENOUS at 07:36

## 2020-05-19 RX ADMIN — PHENYLEPHRINE HYDROCHLORIDE 50 MCG: 10 INJECTION INTRAVENOUS at 08:57

## 2020-05-19 RX ADMIN — ROCURONIUM BROMIDE 40 MG: 10 INJECTION, SOLUTION INTRAVENOUS at 07:37

## 2020-05-19 RX ADMIN — ONDANSETRON 4 MG: 2 INJECTION INTRAMUSCULAR; INTRAVENOUS at 09:21

## 2020-05-19 RX ADMIN — PHENYLEPHRINE HYDROCHLORIDE 100 MCG: 10 INJECTION INTRAVENOUS at 08:45

## 2020-05-19 RX ADMIN — LIDOCAINE HYDROCHLORIDE 80 MG: 20 INJECTION, SOLUTION INTRAVENOUS at 07:36

## 2020-05-19 RX ADMIN — PHENYLEPHRINE HYDROCHLORIDE 100 MCG: 10 INJECTION INTRAVENOUS at 07:48

## 2020-05-19 RX ADMIN — CEFAZOLIN 1 G: 1 INJECTION, POWDER, FOR SOLUTION INTRAMUSCULAR; INTRAVENOUS at 10:51

## 2020-05-19 RX ADMIN — PROPOFOL 50 MG: 10 INJECTION, EMULSION INTRAVENOUS at 10:16

## 2020-05-19 RX ADMIN — PHENYLEPHRINE HYDROCHLORIDE 100 MCG: 10 INJECTION INTRAVENOUS at 09:20

## 2020-05-19 RX ADMIN — EPHEDRINE SULFATE 5 MG: 50 INJECTION, SOLUTION INTRAMUSCULAR; INTRAVENOUS; SUBCUTANEOUS at 08:36

## 2020-05-19 RX ADMIN — ROCURONIUM BROMIDE 25 MG: 10 INJECTION, SOLUTION INTRAVENOUS at 08:12

## 2020-05-19 RX ADMIN — BUPIVACAINE HYDROCHLORIDE 20 ML: 5 INJECTION, SOLUTION EPIDURAL; INTRACAUDAL; PERINEURAL at 07:13

## 2020-05-19 RX ADMIN — CEFTRIAXONE SODIUM 2 G: 2 INJECTION, POWDER, FOR SOLUTION INTRAMUSCULAR; INTRAVENOUS at 07:42

## 2020-05-19 RX ADMIN — DEXAMETHASONE SODIUM PHOSPHATE 4 MG: 4 INJECTION, SOLUTION INTRAMUSCULAR; INTRAVENOUS at 09:21

## 2020-05-19 RX ADMIN — PROPOFOL 50 MG: 10 INJECTION, EMULSION INTRAVENOUS at 10:11

## 2020-05-19 ASSESSMENT — PULMONARY FUNCTION TESTS
PIF_VALUE: 16
PIF_VALUE: 17
PIF_VALUE: 16
PIF_VALUE: 1
PIF_VALUE: 3
PIF_VALUE: 16
PIF_VALUE: 2
PIF_VALUE: 1
PIF_VALUE: 2
PIF_VALUE: 16
PIF_VALUE: 17
PIF_VALUE: 16
PIF_VALUE: 16
PIF_VALUE: 24
PIF_VALUE: 16
PIF_VALUE: 13
PIF_VALUE: 15
PIF_VALUE: 14
PIF_VALUE: 16
PIF_VALUE: 18
PIF_VALUE: 17
PIF_VALUE: 3
PIF_VALUE: 16
PIF_VALUE: 2
PIF_VALUE: 15
PIF_VALUE: 16
PIF_VALUE: 17
PIF_VALUE: 2
PIF_VALUE: 15
PIF_VALUE: 15
PIF_VALUE: 16
PIF_VALUE: 15
PIF_VALUE: 16
PIF_VALUE: 14
PIF_VALUE: 7
PIF_VALUE: 15
PIF_VALUE: 16
PIF_VALUE: 15
PIF_VALUE: 16
PIF_VALUE: 16
PIF_VALUE: 0
PIF_VALUE: 16
PIF_VALUE: 15
PIF_VALUE: 16
PIF_VALUE: 15
PIF_VALUE: 16
PIF_VALUE: 16
PIF_VALUE: 17
PIF_VALUE: 16
PIF_VALUE: 1
PIF_VALUE: 2
PIF_VALUE: 4
PIF_VALUE: 2
PIF_VALUE: 16
PIF_VALUE: 16
PIF_VALUE: 15
PIF_VALUE: 16
PIF_VALUE: 16
PIF_VALUE: 15
PIF_VALUE: 20
PIF_VALUE: 3
PIF_VALUE: 16
PIF_VALUE: 16
PIF_VALUE: 15
PIF_VALUE: 16
PIF_VALUE: 16
PIF_VALUE: 15
PIF_VALUE: 16
PIF_VALUE: 16
PIF_VALUE: 36
PIF_VALUE: 15
PIF_VALUE: 16
PIF_VALUE: 15
PIF_VALUE: 16
PIF_VALUE: 16
PIF_VALUE: 15
PIF_VALUE: 15
PIF_VALUE: 16
PIF_VALUE: 16
PIF_VALUE: 15
PIF_VALUE: 16
PIF_VALUE: 15
PIF_VALUE: 15
PIF_VALUE: 16
PIF_VALUE: 15
PIF_VALUE: 16
PIF_VALUE: 1
PIF_VALUE: 16
PIF_VALUE: 15
PIF_VALUE: 2
PIF_VALUE: 16
PIF_VALUE: 3
PIF_VALUE: 17
PIF_VALUE: 16
PIF_VALUE: 17
PIF_VALUE: 17
PIF_VALUE: 16
PIF_VALUE: 16
PIF_VALUE: 15
PIF_VALUE: 3
PIF_VALUE: 16
PIF_VALUE: 16
PIF_VALUE: 2
PIF_VALUE: 15
PIF_VALUE: 2
PIF_VALUE: 1
PIF_VALUE: 16
PIF_VALUE: 16
PIF_VALUE: 17
PIF_VALUE: 16
PIF_VALUE: 15
PIF_VALUE: 17
PIF_VALUE: 15
PIF_VALUE: 15
PIF_VALUE: 2
PIF_VALUE: 15
PIF_VALUE: 16
PIF_VALUE: 15
PIF_VALUE: 16
PIF_VALUE: 16
PIF_VALUE: 17
PIF_VALUE: 15
PIF_VALUE: 16
PIF_VALUE: 15
PIF_VALUE: 15
PIF_VALUE: 1
PIF_VALUE: 16
PIF_VALUE: 2
PIF_VALUE: 16
PIF_VALUE: 15
PIF_VALUE: 16
PIF_VALUE: 17
PIF_VALUE: 15
PIF_VALUE: 16
PIF_VALUE: 15
PIF_VALUE: 16
PIF_VALUE: 2
PIF_VALUE: 15
PIF_VALUE: 16
PIF_VALUE: 15
PIF_VALUE: 16
PIF_VALUE: 15
PIF_VALUE: 17

## 2020-05-19 ASSESSMENT — PAIN - FUNCTIONAL ASSESSMENT: PAIN_FUNCTIONAL_ASSESSMENT: 0-10

## 2020-05-19 ASSESSMENT — PAIN SCALES - GENERAL: PAINLEVEL_OUTOF10: 0

## 2020-05-19 NOTE — H&P
Sandro Mckay    [id number]    [facility] Same Day Surgery Update H & P  Department of General Surgery   Surgical Service   Pre-operative History and Physical  Last H & P within the last 30 days. DIAGNOSIS:   Primary osteoarthritis of right shoulder [M19.011]    PROCEDURE:  [place] RECONSTR TOTAL SHOULDER IMPLANT [17459] (RIGHT TOTAL SHOULDER REPLACEMENT)     HISTORY OF PRESENT ILLNESS:   Patient with right shoulder pain and limited ROM in the setting of arthrosis. The symptoms have been recalcitrant to conservative treatment and the patient presents today for the above procedure. Covid 19:  Patient denies fever, chills, cough or known exposure to Covid-19.      Past Medical History:        Diagnosis Date    Cancer ([facility] 2009    MELANOMA INSITU ON BACK    Osteoarthritis     PKU (phenylketonuria) ([facility] [facility]      Past Surgical History:        Procedure Laterality Date    JOINT REPLACEMENT Left     partial knee    KNEE SURGERY      SKIN CANCER EXCISION      THYROID SURGERY      1/2 THYROID FOR CYST REMOVED     Past Social History:  Social History     Socioeconomic History    Marital status: Single     Spouse name: None    Number of children: None    Years of education: None    Highest education level: None   Occupational History    None   Social Needs    Financial resource strain: None    Food insecurity     Worry: None     Inability: None    Transportation needs     Medical: None     Non-medical: None   Tobacco Use    Smoking status: Never Smoker    Smokeless tobacco: Never Used   Substance and Sexual Activity    Alcohol use: Yes     Comment: X 1 / MONTH    Drug use: No    Sexual activity: None   Lifestyle    Physical activity     Days per week: None     Minutes per session: None    Stress: None   Relationships    Social connections     Talks on phone: None     Gets together: None     Attends Judaism service: None     Active member of club or organization: None     Attends meetings of clubs

## 2020-05-19 NOTE — ANESTHESIA PRE PROCEDURE
EXCISION      THYROID SURGERY      1/2 THYROID FOR CYST REMOVED       Social History:    Social History     Tobacco Use    Smoking status: Never Smoker    Smokeless tobacco: Never Used   Substance Use Topics    Alcohol use: Yes     Comment: X 1 / MONTH                                Counseling given: Not Answered      Vital Signs (Current):   Vitals:    05/07/20 1520 05/19/20 0616   BP:  (!) 145/95   Pulse:  73   Resp:  16   Temp:  97.7 °F (36.5 °C)   SpO2:  97%   Weight: 180 lb (81.6 kg) 180 lb (81.6 kg)   Height: 5' 7\" (1.702 m) 5' 7\" (1.702 m)                                              BP Readings from Last 3 Encounters:   05/19/20 (!) 145/95   05/13/20 117/88   03/03/20 (!) 142/88       NPO Status: Time of last liquid consumption: 2100                        Time of last solid consumption: 2100                        Date of last liquid consumption: 05/18/20                        Date of last solid food consumption: 05/18/20    BMI:   Wt Readings from Last 3 Encounters:   05/19/20 180 lb (81.6 kg)   05/13/20 179 lb 14.3 oz (81.6 kg)   03/03/20 175 lb 0.7 oz (79.4 kg)     Body mass index is 28.19 kg/m².     CBC:   Lab Results   Component Value Date    WBC 5.3 05/15/2020    RBC 4.41 05/15/2020    HGB 13.5 05/15/2020    HCT 41.5 05/15/2020    MCV 94.1 05/15/2020    RDW 13.4 05/15/2020     05/15/2020       CMP:   Lab Results   Component Value Date     05/15/2020    K 3.8 05/15/2020     05/15/2020    CO2 27 05/15/2020    BUN 9 05/15/2020    CREATININE 0.6 05/15/2020    GFRAA >60 05/15/2020    LABGLOM >60 05/15/2020    GLUCOSE 87 05/15/2020    CALCIUM 9.2 05/15/2020       POC Tests:   Recent Labs     05/19/20  0630   POCGLU 91       Coags:   Lab Results   Component Value Date    PROTIME 11.4 05/15/2020    INR 0.98 05/15/2020    APTT 31.8 05/15/2020       HCG (If Applicable): No results found for: PREGTESTUR, PREGSERUM, HCG, HCGQUANT     ABGs: No results found for: PHART, PO2ART, NTI4VKR,

## 2020-05-19 NOTE — ANESTHESIA PROCEDURE NOTES
Peripheral Block    Patient location during procedure: pre-op  Start time: 5/19/2020 7:10 AM  End time: 5/19/2020 7:17 AM  Staffing  Anesthesiologist: Severa Acres, MD  Performed: anesthesiologist   Preanesthetic Checklist  Completed: patient identified, site marked, surgical consent, pre-op evaluation, timeout performed, IV checked, risks and benefits discussed, monitors and equipment checked, anesthesia consent given, oxygen available and patient being monitored  Peripheral Block  Patient position: sitting  Prep: ChloraPrep  Patient monitoring: cardiac monitor, continuous pulse ox, frequent blood pressure checks and IV access  Block type: Brachial plexus  Laterality: right  Injection technique: single-shot  Procedures: ultrasound guided  Interscalene  Provider prep: mask and sterile gloves  Needle  Needle type: short-bevel   Needle gauge: 22 G  Needle length: 8 cm  Needle localization: ultrasound guidance  Assessment  Injection assessment: negative aspiration for heme, no paresthesia on injection and local visualized surrounding nerve on ultrasound  Paresthesia pain: none  Slow fractionated injection: yes  Hemodynamics: stable  Additional Notes  Immediately prior to procedure a \"time out\" was called to verify the correct patient, allergies, laterality, procedure and equipment. Time out performed with RN. Local Anesthetic: See below    Anterior scalene and middle scalene muscles, upper, middle and lower trunks of the brachial plexus are identified, the tip of the needle and the spread of the local anesthetic around the brachial plexus are visualized. The Brachial plexus appeared to be anatomically normal and there were no abnormal pathologically findings seen. Right Ultrasound-Guided Interscalene Brachial Plexus Block    Indication: Postoperative analgesia upon request of the attending surgeon. Procedure: Informed consent obtained and pre-procedural timeout procedure performed.   Patient supine in

## 2020-05-19 NOTE — PROGRESS NOTES
Ambulatory Surgery/Procedure Discharge Note    Vitals:    05/19/20 1204   BP: 130/65   Pulse: 81   Resp: 18   Temp: 98.4 °F (36.9 °C)   SpO2: 95%       In: 1210 [P.O.:210; I.V.:1000]  Out: 200     Restroom use offered before discharge. Yes/steady gait walking to BR and voided    Pain assessment:  {Pain assessment  Pain Level: 0 scant pain    Right arm immobilizer on. Clean right shoulder dressing. Has movement and sensation in warm pink right fingers. Stable gait to BR and voided without hesitation. Taking soda no nausea. Discharge instructions reviewed. Patient and sister on phone educated, using the teach back method, about follow up instructions and discharge instructions. A completed copy of the AVS instructions given to patient and all questions answered. Return demo of IS with adequate demo. Patient discharged to home/self care.  Patient discharged via wheel chair by me to waiting family/S.O.       5/19/2020 1:02 PM
Hospitalist will not see patient because they are not inpatient. Dr. Zeina Sherwood made aware. Patient is stable and comfortable. Meets pacu dc criteria.
PACU Transfer to [facility]    Vitals:    05/19/20 1130   BP: 134/77   Pulse: 80   Resp: 14   Temp: 98 °F (36.7 °C)   SpO2: 95%         Intake/Output Summary (Last 24 hours) at 5/19/2020 1134  Last data filed at 5/19/2020 0939  Gross per 24 hour   Intake 1000 ml   Output 200 ml   Net 800 ml       Pain assessment:  none  Pain Level: 0    Patient transferred to care of [facility] RN.    5/19/2020 11:34 AM
[facility] [employer] / [facility] ([facility]) [address], [address]    Acknowledgment of Informed Consent for Surgical or Medical Procedure and Sedation  I agree to allow doctor(s) Nam Kim and his/her associates or assistants, including residents and/or other qualified medical practitioner to perform the following medical treatment or procedure and to administer or direct the administration of sedation as necessary:  Procedure(s): RIGHT TOTAL SHOULDER REPLACEMENT  My doctor has explained the following regarding the proposed procedure:   the explanation of the procedure   the benefits of the procedure   the potential problems that might occur during recuperation   the risks and side effects of the procedure which could include but are not limited to severe blood loss, infection, stroke or death   the benefits, risks and side effect of alternative procedures including the consequences of declining this procedure or any alternative procedures   the likelihood of achieving satisfactory results. I acknowledge no guarantee or assurance has been made to me regarding the results. I understand that during the course of this treatment/procedure, unforeseen conditions can occur which require an additional or different procedure. I agree to allow my physician or assistants to perform such extension of the original procedure as they may find necessary. I understand that sedation will often result in temporary impairment of memory and fine motor skills and that sedation can occasionally progress to a state of deep sedation or general anesthesia. I understand the risks of anesthesia for surgery include, but are not limited to, sore throat, hoarseness, injury to face, mouth, or teeth; nausea; headache; injury to blood vessels or nerves; death, brain damage, or paralysis.     I understand that if I have a Limitation of Treatment order in effect during my hospitalization, the order may or may not be
potential side effects. 2. For comfort and safety, arrange to have someone at home with you for the first 24 hours after discharge. 3. You and your family will be given written instructions about your diet, activity, dressing care, medications, and return visits. 4. Once at home, should issues with nausea, pain, or bleeding occur, or should you notice any signs of infection, you should call your surgeon. 5. Always clean your hands before and after caring for your wound. Do not let your family touch your surgery site without cleaning their hands. 6. Narcotic pain medications can cause significant constipation. You may want to add a stool softener to your postoperative medication schedule or speak to your surgeon on how best to manage this SIDE EFFECT. SPECIAL INSTRUCTIONS     Thank you for allowing us to care for you. We strive to exceed your expectations in the delivery of care and service provided to you and your family. If you need to contact us for any reason, please call us at [phone]    Instructions reviewed with patient during preadmission testing phone interview. Zehra Cope. 5/7/2020 .3:28 PM      ADDITIONAL EDUCATIONAL INFORMATION REVIEWED PER PHONE WITH YOU AND/OR YOUR FAMILY:  No Bring a urine sample on day of surgery  Yes Pain Goal-Taking Control of Your Pain  Yes FAQs about Surgical Site Infections  No Hibiclens® Bathing Instructions   No Antibacterial Soap  Yes Louis® Wipes Bathing Instructions (Obtained from: [email or web address] pdf )  No Incentive Spirometer Education  No Other

## 2020-05-19 NOTE — BRIEF OP NOTE
Brief Postoperative Note      Patient: Cheri Garcia  YOB: 1965  MRN: [id number]    Date of Procedure: 5/19/2020    Pre-Op Diagnosis: Primary osteoarthritis of right shoulder [M19.011]    Post-Op Diagnosis: Same       Procedure(s):  RIGHT SHOULDER ARTHROTOMY, REMOVAL OF LOOSE BODIES, OPEN BICEPS TENODESIS, ANATOMIC TOTAL SHOULDER ARTHROPLASTY    Surgeon(s):  Jenne Litten, MD Malcolm Rummer, DO    Assistant:  Surgical Assistant: Alondra Cavazos RN    Anesthesia: General    Estimated Blood Loss (mL): less than 533     Complications: None    Specimens:   * No specimens in log *    Implants:  * No implants in log *      Drains: * No LDAs found *    Findings: see operative report    Electronically signed by Vanda Johnson DO on 5/19/2020 at 9:47 AM

## 2020-05-19 NOTE — TELEPHONE ENCOUNTER
Patient would like a call back today, has a question in regards to the stretching machine for her arm. Also wants to know if she needs to wear compression stocking.  Ph# [phone]

## 2020-05-20 NOTE — OP NOTE
with longstanding history of right shoulder pain and stiffness. She had end-stage glenohumeral arthritis. She had failed multimodal  conservative treatments. She wished definitive treatments. We offered  anatomic total shoulder replacement. An MRI confirmed an intact rotator  cuff. She understood the potential risks and benefits of surgery, gave  her informed consent, and wished to proceed. She understood risks such  as bleeding, infection, anesthetic risks, injury to nerves and blood  vessels, stiffness, weakness, incomplete pain relief, and need for  further surgery. She understood all of this. DESCRIPTION OF PROCEDURE:  On the date of procedure, brought back to the  operating room and placed supine on the operating table. General  anesthesia was established. Preoperative antibiotics were given in the  holding area. Under anesthesia, exam was carried out with the findings  as noted above. The patient was positioned using a Tenet beach-chair  positioner in semi-sitting position. Trunk was elevated to 45 degrees. All pressure points were padded. The patient's right shoulder and arm  were prepped and draped in a standard manner for shoulder replacement  surgery. We used [place] to cover the operative field. We used a Tenet  Spider arm tinsley to facilitate arm position. All members of the  surgical team wore body exhaust suits. We approached her shoulder through a roughly 10-cm oblique incision from  the coracoid coursing down towards the level of the deltoid tubercle. The incision was carried out with a skin knife through the skin and  subcutaneous tissue. Electrocautery was used to establish hemostasis. Gelpi retractor was placed. Deltopectoral interval was identified and  developed with a combination of blunt and sharp dissection. The  clavipectoral fascia was thickened. A portion of this was excised. We  released the subdeltoid and subacromial adhesions.   We opened up the  transverse humeral ligament with cautery. We removed all the  tenosynovitis. We tenodesed the biceps in situ to the upper rolled  border of the pectoralis major tendon. This was done using multiple #2  Ethibond figure-of-eight stitches. Tendon proximal to this was excised. We took down the subscapularis sharply off the lesser tuberosity. We  released the inferior capsule and exposed the large osteophytes with a  Hohmann retractor. We released the capsule all the way around the  osteophytes. We dislocated the humeral head. We used a 1/2-inch curved  osteotome to resect the osteophytes and these were removed with a  rongeur. We carried out our removal of osteophytes all the way  posteromedial.  We placed our cutting guide in appropriate height and  retroversion and pinned the guide in place. We used an oscillating saw  to resect the humeral head. We took a generous cut right off the  articular margin in the rotator cuff. We removed the humeral head off  to back table and later harvested cancellous bone from that head to  enhance our press-fit. We prepared the humerus. We used a canal finder to find the canal.   Bone quality was soft. We were able to broach. So, we were able to  ream up to size 16 and broached up to size 16 broach. We inserted our  broach and seated it. We had good coverage. We removed additional  osteophytes posteromedial.  We chose a 46 x 18 offset humeral head and  this was placed in appropriate offset position and had excellent  coverage. We removed the trial head and neck and left the broach in  place. Our attention next turned towards the glenoid. We excised the anterior  capsule and removed all the rotator interval tissue. We used a Fukuda  retractor to retract the humeral head out of the way. We used Hohmann  retractor around the glenoid. We excised the labrum and released the  anterior and inferior capsule. We had good en face view of the glenoid.   Biceps

## 2020-05-22 ENCOUNTER — EVALUATION (OUTPATIENT)
Dept: PHYSICAL THERAPY | Age: 55
End: 2020-05-22
Payer: COMMERCIAL

## 2020-05-22 PROCEDURE — 97110 THERAPEUTIC EXERCISES: CPT | Performed by: PHYSICAL THERAPIST

## 2020-05-22 PROCEDURE — 97161 PT EVAL LOW COMPLEX 20 MIN: CPT | Performed by: PHYSICAL THERAPIST

## 2020-05-22 PROCEDURE — 97112 NEUROMUSCULAR REEDUCATION: CPT | Performed by: PHYSICAL THERAPIST

## 2020-05-22 NOTE — PROGRESS NOTES
[facility]   Phone: [phone]    Fax: [phone]      Physical Therapy Treatment Note/ Progress Report:           Date:  2020    Patient Name:  Georgiana Loyola    :  1965  MRN: <[id number]>  Restrictions/Precautions:    Medical/Treatment Diagnosis Information:  · Diagnosis: R shoulder OA s/p TSA  · DOS: 8131  Insurance/Certification information:  PT Insurance Information: BCBS  Physician Information:  Referring Practitioner: Dr. Kristie Akers  Has the plan of care been signed (Y/N):        []  Yes  [x]  No     Date of Patient follow up with Physician: 2020      Is this a Progress Report:     [x]  Yes  []  No        If Yes:  Date Range for reporting period:  Beginnin2020  Endin2020    Progress report will be due (10 Rx or 30 days whichever is less):       Recertification will be due (POC Duration  / 90 days whichever is less): 2020        Visit # Insurance Allowable Auth Required   1 90 (hard max) []  Yes [x]  No        Functional Scale: UEFI: 10/80, 87.5% limitaiton   Date assessed: 2020     Latex Allergy:  [x]NO      []YES  Preferred Language for Healthcare:   [x]English       []other:      Pain level:  0-4/10     SUBJECTIVE:  See eval    OBJECTIVE: See eval       ROM PROM AROM  Comment:  2020    L R L R    Flexion  72° (table slide) 160° NT    Abduction   168° NT    ER   T3 NT    IR   T9 NT    Elbow Flex        Elbow Ext            Strength: deferred secondary to surgery L R Comment:  2020       Special Tests Results/Comment: deferred secondary to surgery:  2020        RESTRICTIONS/PRECAUTIONS: R TSA    Exercises/Interventions:     Therapeutic Ex (93405) Sets/sec Reps Notes/CUES   AD UE BIKE            STRETCHING/ROM      Pulleys      Table Slides 10\" x10 Flex to max of 90°   Wand      UE Ramona      Pendulum      Doorway      Wall Slides      CBA      TP BB      Sleeper       Elbow PROM  x20    Wrist 4 way  x20          ISOMETRICS      Gripping 2'     Shrugs 3-5\" x20    Retraction 3-5\" x20    Abduction      Flexion      Internal Rotation      External Rotation            STRENGTHENING-PREs      Flexion      Abduction      Internal Rotation      External Rotation      Shrugs      Biceps      Triceps      Retraction      Extension      Horizontal Abduction in ER      Serratus                        THERABANDS/CABLE COLUMN      Rows      Lats      Extension      Internal Rotation      External Rotation      Biceps      Triceps      PNF                                    Manual Intervention (46803)      Scar Massage      STM      Hawkgrips      GH joint mobilizations      Foamroll                  NMR re-education (42972)   CUES NEEDED   Plyoback      Therabar oscillations      Body Blade       Rhythmic Stabilization      Ball on the wall                              Therapeutic Activity (73668)      Core training      Swiss ball activities      Education                              Therapeutic Exercise and NMR EXR  [x] (95282) Provided verbal/tactile cueing for activities related to strengthening, flexibility, endurance, ROM  for improvements in scapular, scapulothoracic and UE control with self care, reaching, carrying, lifting, house/yardwork, driving/computer work. [x] (51183) Provided verbal/tactile cueing for activities related to improving balance, coordination, kinesthetic sense, posture, motor skill, proprioception  to assist with  scapular, scapulothoracic and UE control with self care, reaching, carrying, lifting, house/yardwork, driving/computer work. Therapeutic Activities:    [] (47589 or 37985) Provided verbal/tactile cueing for activities related to improving balance, coordination, kinesthetic sense, posture, motor skill, proprioception and motor activation to allow for proper function of scapular, scapulothoracic and UE control with self care, carrying, lifting, driving/computer work. Home Exercise Program:    [x] (33037) Reviewed/Progressed HEP activities related to strengthening, flexibility, endurance, ROM of scapular, scapulothoracic and UE control with self care, reaching, carrying, lifting, house/yardwork, driving/computer work  [] (98949) Reviewed/Progressed HEP activities related to improving balance, coordination, kinesthetic sense, posture, motor skill, proprioception of scapular, scapulothoracic and UE control with self care, reaching, carrying, lifting, house/yardwork, driving/computer work      Manual Treatments:  PROM / STM / Oscillations-Mobs:  G-I, II, III, IV (PA's, Inf., Post.)  [] (44259) Provided manual therapy to mobilize soft tissue/joints of cervical/CT, scapular GHJ and UE for the purpose of modulating pain, promoting relaxation,  increasing ROM, reducing/eliminating soft tissue swelling/inflammation/restriction, improving soft tissue extensibility and allowing for proper ROM for normal function with self care, reaching, carrying, lifting, house/yardwork, driving/computer work    Modalities:     [] GAME READY (VASO)- for significant edema, swelling, pain control. [x] CP to R shoulder x10'    Charges:  Timed Code Treatment Minutes: 30   Total Treatment Minutes: 50      [x] EVAL (LOW) 20270 (typically 20 minutes face-to-face)  [] EVAL (MOD) 25824 (typically 30 minutes face-to-face)  [] EVAL (HIGH) 52447 (typically 45 minutes face-to-face)  [] RE-EVAL     [x] KM(03880) x  1   [] IONTO  [x] NMR (65419) x1     [] VASO  [] Manual (14220) x     [] Other:  [] TA x      [] Mech Traction (40890)  [] ES(attended) (50620)      [] ES (un) (16872):         ASSESSMENT:  See eval      GOALS:  Patient stated goal: Able to regain ROM and perform all tasks without pain  [] Progressing: [] Met: [] Not Met: [] Adjusted    Therapist goals for Patient:   Short Term Goals: To be achieved in: 2 weeks  1. Independent in HEP and progression per patient tolerance, in order to prevent re-injury. complete treatment  [] Patient limited by fatigue  [] Patient limited by pain    [] Patient limited by other medical complications  [] Other:           PLAN: See eval  [] Continue per plan of care [] Alter current plan   [x] Plan of care initiated [] Hold pending MD visit [] Discharge      Electronically signed by:  Poly Urban, PT, DPT, OMT-C      [place] PT license: [id number]  [place] PT license: [id number]      Note: If patient does not return for scheduled/ recommended follow up visits, this note will serve as a discharge from care along with most recent update on progress.

## 2020-05-22 NOTE — PROGRESS NOTES
completed on Tuesday 5/19/2020. She notes that pain has been pretty well managed, and she has been using her CPM 3x/day. She notes that she has been able to go without pain medication and no tylenol, and has only been taking aspirin per MD instruction. She notes that she has been sleeping in bed with pillows propping her up which seems to be working better.     Relevant Medical History:OA, PKU, Hx of thyroid sx, hx of skin cancer  Functional Disability Index:  UEFI: 10/80, 87.5% limitation    Pain Scale: 0-4/10  Easing factors: ice, rest, CPM chair  Provocative factors: transferring sit to stand and supine to sit    Type: []Constant   [x]Intermittent  []Radiating []Localized []other:     Numbness/Tingling: Patient denies    Occupation/School: Patient is employed full time as an  but is currently off work    Living Status/Prior Level of Function: Independent with ADLs and IADLs,   Patient is a L handed    OBJECTIVE:        ROM PROM AROM  Comment:  5/22/2020    L R L R    Flexion  72° (table slide) 160° NT    Abduction   168° NT    ER   T3 NT    IR   T9 NT    Elbow Flex        Elbow Ext            Strength: deferred secondary to surgery L R Comment:  5/22/2020       Special Tests Results/Comment: deferred secondary to surgery:  5/22/2020       Reflexes/Sensation:  Formal assessment deferred secondary to surgery   []Dermatomes/Myotomes intact    []Reflexes equal and normal bilaterally   []Other:    Joint mobility: Formal assessment deferred secondary to surgery   []Normal    []Hypo   []Hyper    Palpation: +2 TTP globally in R shoulder    Functional Mobility/Transfers: Patient is limited with all ADLs secondary to postop restrictions of R UE (no active motion)     Posture: forward head and rounded shoulders    Bandages/Dressings/Incisions: incisions are clean and dry with no signs of infection    Gait: (include devices/WB status): decreased trunk rotation and no reciprocal arm swing secondary to presence of sling postoperatively    Orthopedic Special Tests: see above                       [x] Patient history, allergies, meds reviewed. Medical chart reviewed. See intake form. Review Of Systems (ROS):  [x]Performed Review of systems (Integumentary, CardioPulmonary, Neurological) by intake and observation. Intake form has been scanned into medical record. Patient has been instructed to contact their primary care physician regarding ROS issues if not already being addressed at this time.       Co-morbidities/Complexities (which will affect course of rehabilitation):   []None           Arthritic conditions   []Rheumatoid arthritis (M05.9)  [x]Osteoarthritis (M19.91)   Cardiovascular conditions   []Hypertension (I10)  []Hyperlipidemia (E78.5)  []Angina pectoris (I20)  []Atherosclerosis (I70)   Musculoskeletal conditions   []Disc pathology   []Congenital spine pathologies   []Prior surgical intervention  []Osteoporosis (M81.8)  []Osteopenia (M85.8)   Endocrine conditions   []Hypothyroid (E03.9)  []Hyperthyroid Gastrointestinal conditions   []Constipation (G72.18)   Metabolic conditions   []Morbid obesity (E66.01)  []Diabetes type 1(E10.65) or 2 (E11.65)   []Neuropathy (G60.9)     Pulmonary conditions   []Asthma (J45)  []Coughing   []COPD (J44.9)   Psychological Disorders  []Anxiety (F41.9)  []Depression (F32.9)   []Other:   [x]Other: PKU, Hx of skin cancer, Hx of thyroid surgery         Barriers to/and or personal factors that will affect rehab potential:              []Age  []Sex              [x]Motivation/Lack of Motivation: Patient demonstrates high motivation                        []Co-Morbidities              []Cognitive Function, education/learning barriers              []Environmental, home barriers              []profession/work barriers  [x]past PT/medical experience: Patient has had prior skilled PT experience and demonstrates good understanding of POC  []other:  Justification: see above     Falls

## 2020-05-26 ENCOUNTER — OFFICE VISIT (OUTPATIENT)
Dept: ORTHOPEDIC SURGERY | Age: 55
End: 2020-05-26

## 2020-05-26 VITALS — HEIGHT: 67 IN | BODY MASS INDEX: 28.24 KG/M2 | WEIGHT: 179.9 LBS

## 2020-05-26 PROCEDURE — 99024 POSTOP FOLLOW-UP VISIT: CPT | Performed by: ORTHOPAEDIC SURGERY

## 2020-05-26 NOTE — PROGRESS NOTES
History of Present Illness:  Ji Farr is a 62 y.o. female who presents for a post operative visit. The patient underwent a right anatomic shoulder arthroplasty on 5/19/2020 by Dr. Kenya Arredondo. Overall she is doing okay and feels that their pain is well controlled with current pain medications. She has been compliant with wearing the UltraSling brace at all times. She reports that her pain is well controlled and is improving right now. Her surgery was done on an outpatient basis and she reports that everything went well. She has the CPM machine and feels that helps keep her pain levels down. The patient denies any fevers, chills, numbness, tingling, and shortness of breath. Medical History:  Patient's medications, allergies, past medical, surgical, social and family histories were reviewed and updated as appropriate. Review of Systems    Patient has had no medical changes since last evaluated      Review of Systems  A 14 point review of systems was completed by the patient is available in the media section of the scanned medical record and was reviewed on 5/26/2020. Vital Signs:  Vitals:       General/Appearance: Alert and oriented and in no apparent distress. Skin:  There are no skin lesions, cellulitis, or extreme edema. The patient has warm and well-perfused Bilateral upper extremities with brisk capillary refill. Right Shoulder Exam:    Inspection: Right shoulder incision that is clean, dry and intact and well approximated. The Prineo dressing is still in place. Mild ecchymosis and swelling are present as can be expected. There is no erythema, drainage or other signs of infection    Palpation:  No crepitus to gentle motion    Active Range of Motion: Deferred    Passive Range of Motion:  She as FE of 30 and ER of 0. Strength:  Deferred    Special Tests:  Deferred.     Neurovascular: Sensation to light touch is intact, no motor deficits, palpable radial pulses 2+    Radiology:

## 2020-05-28 ENCOUNTER — TREATMENT (OUTPATIENT)
Dept: PHYSICAL THERAPY | Age: 55
End: 2020-05-28
Payer: COMMERCIAL

## 2020-05-28 PROCEDURE — 97110 THERAPEUTIC EXERCISES: CPT | Performed by: PHYSICAL THERAPIST

## 2020-05-28 PROCEDURE — 97140 MANUAL THERAPY 1/> REGIONS: CPT | Performed by: PHYSICAL THERAPIST

## 2020-06-04 ENCOUNTER — TREATMENT (OUTPATIENT)
Dept: PHYSICAL THERAPY | Age: 55
End: 2020-06-04
Payer: COMMERCIAL

## 2020-06-04 PROCEDURE — 97140 MANUAL THERAPY 1/> REGIONS: CPT | Performed by: PHYSICAL THERAPIST

## 2020-06-04 PROCEDURE — 97110 THERAPEUTIC EXERCISES: CPT | Performed by: PHYSICAL THERAPIST

## 2020-06-04 PROCEDURE — 97112 NEUROMUSCULAR REEDUCATION: CPT | Performed by: PHYSICAL THERAPIST

## 2020-06-04 NOTE — PROGRESS NOTES
[] IONTO  [x] NMR (91803) x 1     [] VASO  [x] Manual (64732) x1     [] Other:  [] TA x      [] Mech Traction (96660)  [] ES(attended) (74514)      [] ES (un) (10182):         ASSESSMENT:  Pendulums and table walk backs added to patient's program today. She requires minimal VCs to keep pendulums passive for R shoulder ROM, but overall has good form. She exhibits significant muscle tightness in the R UT noted during manual STM by PT. She reports decreased stiffness following manual STM today. Patient was provided with updated HEP today, see media in chart. GOALS:  Patient stated goal: Able to regain ROM and perform all tasks without pain  [] Progressing: [] Met: [] Not Met: [] Adjusted    Therapist goals for Patient:   Short Term Goals: To be achieved in: 2 weeks  1. Independent in HEP and progression per patient tolerance, in order to prevent re-injury. [] Progressing: [] Met: [] Not Met: [] Adjusted  2. Patient will have a decrease in pain to facilitate improvement in movement, function, and ADLs as indicated by Functional Deficits. [] Progressing: [] Met: [] Not Met: [] Adjusted    Long Term Goals: To be achieved in: 16 weeks  1. Disability index score of 15% or less for the UEFI to assist with reaching prior level of function. [] Progressing: [] Met: [] Not Met: [] Adjusted  2. Patient will demonstrate increased R shoulder flexion, abduction, ER, and IR AROM to >155°, >155°, T2, and T10 respectively to allow for proper joint functioning as indicated by patients Functional Deficits. [] Progressing: [] Met: [] Not Met: [] Adjusted  3. Patient will demonstrate an increase in R shoulder strength in all planes  to 4+/5 or greater to allow for proper functional mobility as indicated by patients Functional Deficits. [] Progressing: [] Met: [] Not Met: [] Adjusted  4. Patient will return to all functional activities without increased symptoms or restriction.    [] Progressing: [] Met: [] Not Met: []

## 2020-06-09 ENCOUNTER — OFFICE VISIT (OUTPATIENT)
Dept: ORTHOPEDIC SURGERY | Age: 55
End: 2020-06-09

## 2020-06-09 VITALS — HEIGHT: 67 IN | TEMPERATURE: 97.5 F | BODY MASS INDEX: 28.24 KG/M2 | WEIGHT: 179.9 LBS

## 2020-06-09 PROCEDURE — 99024 POSTOP FOLLOW-UP VISIT: CPT | Performed by: ORTHOPAEDIC SURGERY

## 2020-06-09 RX ORDER — ALBUTEROL SULFATE 90 UG/1
AEROSOL, METERED RESPIRATORY (INHALATION)
COMMUNITY
Start: 2020-05-22 | End: 2020-06-09

## 2020-06-09 NOTE — PROGRESS NOTES
Patient has had no medical changes since last evaluated
She is doing well. Impression:  Encounter Diagnoses   Name Primary?  Status post total replacement of right shoulder Yes    History of right shoulder replacement     Primary osteoarthritis of right shoulder        Office Procedures:  Orders Placed This Encounter   Procedures    Ambulatory referral to Physical Therapy     Referral Priority:   Routine     Referral Type:   Eval and Treat     Referral Reason:   Specialty Services Required     Requested Specialty:   Physical Therapy     Number of Visits Requested:   1       Treatment Plan:    Overall the patient is doing well. The pain is well-controlled. We recommend that she wear the UltraSling brace mainly when in crowds. She may discontinue it at home. She may resume driving her car without a sling. She may go to therapy twice a week now. We will give a print out of their physical therapy order. All of her questions were fully answered today. We would like to see her back in 2 weeks for follow-up visit. 1:55 PM      Kelly Briggs PA-C  Orthopaedic Sports Medicine Physician Assistant    During this examination, Kelly ZIMMERMAN PA-C, functioned as a scribe for Dr. Kenya Arredondo. This dictation was performed with a verbal recognition program (DRAGON) and it was checked for errors. It is possible that there are still dictated errors within this office note. If so, please bring any errors to my attention for an addendum. All efforts were made to ensure that this office note is accurate.  __________________  I, Dr. Kenya Arredondo, personally performed the services described in this documentation as described by Kelly Briggs PA-C in my presence, and it is both accurate and complete. Jose F Chapin MD, PhD  6/9/2020

## 2020-06-09 NOTE — LETTER
Physical Therapy Rehabilitation Referral    Patient Name:  Joselyn Templeton      YOB: 1965    Diagnosis:    1. Status post total replacement of right shoulder    2. History of right shoulder replacement    3. Primary osteoarthritis of right shoulder        Precautions:  Subscapularis precautions. [x] Evaluate and Treat    Post Op Instructions:  [x] Continuous passive motion (CPM) [x] Elbow ROM  [x] Exercise in plane of scapula  [x]  Strengthening     [] Pulley and instruction   [x] Home exercise program (copy to patient)   [x] Sling when arm at risk  [] Sling or brace at all times   [x] AAROM: Forward elevation to  140            [x] AAROM: External rotation  To  40    [] Isometric external rotator strengthening [] AAROM: internal rotation: up the back  [x] Isometric abductor strengthening  [] AAROM: Internal abduction   [] Isometric internal rotator strengthening [] AAROM: cross-body adduction             Stretching:     Strengthening:  [] Four quadrant (FE, ER, IR, CBA)  [] Rotator cuff (ER, IR, Abd)  [x] Forward Elevation    [] External Rotators     [x] External Rotation    [] Internal Rotators  [] Internal Rotation: up/back   [] Abductors     [] Internal Rotation: supine in abduction  [] Sleeper Stretch    [] Flexors  [] Cross-body abduction    [] Extensors  [x] Pendulum (FE, Abd/Add, cw/ccw)  [x] Scapular Stabilizers   [x] Wall-walking (FE, Abd)        [x] Shoulder shrugs     [x] Table slides (FE)                [x] Rhomboid pinch  [] Elbow (flex, ext, pron, sup)        [] Lat.  Pull downs     [] Medial epicondylitis program       [] Forward punch   [] Lateral epicondylitis program       [] Internal rotators     [] Progressive resistive exercises  [] Bench Press        [] Bench press plus  Activities:     [] Lateral pull-downs  [] Rowing     [] Progressive two-hand supine press  [] Stepper/Exercise bike   [] Biceps: curls/supination  [] Swimming  [] Water exercises

## 2020-06-11 ENCOUNTER — TREATMENT (OUTPATIENT)
Dept: PHYSICAL THERAPY | Age: 55
End: 2020-06-11
Payer: COMMERCIAL

## 2020-06-11 PROCEDURE — 97110 THERAPEUTIC EXERCISES: CPT | Performed by: PHYSICAL THERAPIST

## 2020-06-11 PROCEDURE — 97112 NEUROMUSCULAR REEDUCATION: CPT | Performed by: PHYSICAL THERAPIST

## 2020-06-11 PROCEDURE — 97140 MANUAL THERAPY 1/> REGIONS: CPT | Performed by: PHYSICAL THERAPIST

## 2020-06-11 NOTE — PROGRESS NOTES
[facility]   Phone: [phone]    Fax: [phone]      Physical Therapy Treatment Note/ Progress Report:           Date:  2020    Patient Name:  Manju Cervantes    :  1965  MRN: <[id number]>  Restrictions/Precautions:    Medical/Treatment Diagnosis Information:  · Diagnosis: R shoulder OA s/p TSA  · DOS:   Insurance/Certification information:  PT Insurance Information: BCBS  Physician Information:  Referring Practitioner: Dr. Ammy Luevano  Has the plan of care been signed (Y/N):        [x]  Yes  []  No     Date of Patient follow up with Physician: 2020      Is this a Progress Report:     []  Yes  [x]  No        If Yes:  Date Range for reporting period:  Beginnin2020  Endin2020    Progress report will be due (10 Rx or 30 days whichever is less): 3/34/9743      Recertification will be due (POC Duration  / 90 days whichever is less): 2020        Visit # Insurance Allowable Auth Required   4 90 (hard max) []  Yes [x]  No        Functional Scale: UEFI: 10/80, 87.5% limitaiton   Date assessed: 2020     Latex Allergy:  [x]NO      []YES  Preferred Language for Healthcare:   [x]English       []other:      Pain level:  0-4/10     SUBJECTIVE:  Patient reports that her follow up appointment went well on Tuesday. She notes that Dr. Ammy Luevano was pleased with how she is doing well, though her ER seems a little tight at this point. She notes that Dr. Ammy Luevano told her that she can wean from her sling at home and wear it when out and about. She states that it was a little sore with coming out of the sling, but it's getting a little better each day.     3+ weeks postop    OBJECTIVE: See eval       ROM PROM AROM  Comment:  2020    L R L R    Flexion  72° (table slide) 160° NT    Abduction   168° NT    ER   T3 NT    IR   T9 NT    Elbow Flex        Elbow Ext            Strength: deferred secondary to surgery L R Comment:  2020       Special (typically 30 minutes face-to-face)  [] EVAL (HIGH) 26028 (typically 45 minutes face-to-face)  [] RE-EVAL     [x] HU(44546) x  1   [] IONTO  [x] NMR (35371) x 1     [] VASO  [x] Manual (40482) x1     [] Other:  [] TA x      [] Mech Traction (90760)  [] ES(attended) (06820)      [] ES (un) (99956):         ASSESSMENT:  Patient continues to demonstrate good carry over with all exercises, requiring minimal VCs throughout session. She does exhibit moderate stiffness of R shoulder ER PROM, and she and PT discussed performing increased reps of wand ER stretch at home and completing it 2-3 times a day. Stiffness and muscle guarding was noted during manual PROM stretching by PT. Patient is now greater than 3 weeks postop, therefore elbow ROM was progressed to active with no pain reported. Patient was educated that she is still not to carry weight in the R UE and no bicep curls with weight until 6 weeks postop. GOALS:  Patient stated goal: Able to regain ROM and perform all tasks without pain  [] Progressing: [] Met: [] Not Met: [] Adjusted    Therapist goals for Patient:   Short Term Goals: To be achieved in: 2 weeks  1. Independent in HEP and progression per patient tolerance, in order to prevent re-injury. [] Progressing: [] Met: [] Not Met: [] Adjusted  2. Patient will have a decrease in pain to facilitate improvement in movement, function, and ADLs as indicated by Functional Deficits. [] Progressing: [] Met: [] Not Met: [] Adjusted    Long Term Goals: To be achieved in: 16 weeks  1. Disability index score of 15% or less for the UEFI to assist with reaching prior level of function. [] Progressing: [] Met: [] Not Met: [] Adjusted  2. Patient will demonstrate increased R shoulder flexion, abduction, ER, and IR AROM to >155°, >155°, T2, and T10 respectively to allow for proper joint functioning as indicated by patients Functional Deficits. [] Progressing: [] Met: [] Not Met: [] Adjusted  3.  Patient will demonstrate an increase in R shoulder strength in all planes  to 4+/5 or greater to allow for proper functional mobility as indicated by patients Functional Deficits. [] Progressing: [] Met: [] Not Met: [] Adjusted  4. Patient will return to all functional activities without increased symptoms or restriction. [] Progressing: [] Met: [] Not Met: [] Adjusted  5. Patient will be able to complete all bathing and dressing ADLs without pain or compensation so that she may return to PLOF for all ADLs. [] Progressing: [] Met: [] Not Met: [] Adjusted        Overall Progression Towards Functional goals/ Treatment Progress Update:  [] Patient is progressing as expected towards functional goals listed. [] Progression is slowed due to complexities/Impairments listed. [] Progression has been slowed due to co-morbidities. [x] Plan just implemented, too soon to assess goals progression <30days   [] Goals require adjustment due to lack of progress  [] Patient is not progressing as expected and requires additional follow up with physician  [] Other    Prognosis for POC: [x] Good [] Fair  [] Poor      Patient requires continued skilled intervention: [x] Yes  [] No    Treatment/Activity Tolerance:  [x] Patient able to complete treatment  [] Patient limited by fatigue  [] Patient limited by pain    [] Patient limited by other medical complications  [] Other:           PLAN: See eval  [x] Continue per plan of care [] Alter current plan   [] Plan of care initiated [] Hold pending MD visit [] Discharge      Electronically signed by:  Lore Lopez, PT, DPT, OMJENNYC      [place] PT license: [id number]  [place] PT license: [id number]      Note: If patient does not return for scheduled/ recommended follow up visits, this note will serve as a discharge from care along with most recent update on progress.

## 2020-06-17 ENCOUNTER — TREATMENT (OUTPATIENT)
Dept: PHYSICAL THERAPY | Age: 55
End: 2020-06-17
Payer: COMMERCIAL

## 2020-06-17 PROCEDURE — 97140 MANUAL THERAPY 1/> REGIONS: CPT | Performed by: PHYSICAL THERAPIST

## 2020-06-17 PROCEDURE — 97110 THERAPEUTIC EXERCISES: CPT | Performed by: PHYSICAL THERAPIST

## 2020-06-17 PROCEDURE — 97112 NEUROMUSCULAR REEDUCATION: CPT | Performed by: PHYSICAL THERAPIST

## 2020-06-17 NOTE — PROGRESS NOTES
functional mobility as indicated by patients Functional Deficits. [] Progressing: [] Met: [] Not Met: [] Adjusted  4. Patient will return to all functional activities without increased symptoms or restriction. [] Progressing: [] Met: [] Not Met: [] Adjusted  5. Patient will be able to complete all bathing and dressing ADLs without pain or compensation so that she may return to PLOF for all ADLs. [] Progressing: [] Met: [] Not Met: [] Adjusted        Overall Progression Towards Functional goals/ Treatment Progress Update:  [] Patient is progressing as expected towards functional goals listed. [] Progression is slowed due to complexities/Impairments listed. [] Progression has been slowed due to co-morbidities. [x] Plan just implemented, too soon to assess goals progression <30days   [] Goals require adjustment due to lack of progress  [] Patient is not progressing as expected and requires additional follow up with physician  [] Other    Prognosis for POC: [x] Good [] Fair  [] Poor      Patient requires continued skilled intervention: [x] Yes  [] No    Treatment/Activity Tolerance:  [x] Patient able to complete treatment  [] Patient limited by fatigue  [] Patient limited by pain    [] Patient limited by other medical complications  [] Other:           PLAN: See eval  [x] Continue per plan of care [] Alter current plan   [] Plan of care initiated [] Hold pending MD visit [] Discharge      Electronically signed by:  Fabrizio Newman, PT, DPT, OMT-C      [place] PT license: [id number]  [place] PT license: [id number]      Note: If patient does not return for scheduled/ recommended follow up visits, this note will serve as a discharge from care along with most recent update on progress.

## 2020-06-25 ENCOUNTER — TREATMENT (OUTPATIENT)
Dept: PHYSICAL THERAPY | Age: 55
End: 2020-06-25
Payer: COMMERCIAL

## 2020-06-25 PROCEDURE — 97140 MANUAL THERAPY 1/> REGIONS: CPT | Performed by: PHYSICAL THERAPIST

## 2020-06-25 PROCEDURE — 97112 NEUROMUSCULAR REEDUCATION: CPT | Performed by: PHYSICAL THERAPIST

## 2020-06-25 PROCEDURE — 97110 THERAPEUTIC EXERCISES: CPT | Performed by: PHYSICAL THERAPIST

## 2020-06-25 NOTE — PROGRESS NOTES
for all ADLs. GOALS:  Patient stated goal: Able to regain ROM and perform all tasks without pain  [x] Progressing: [] Met: [] Not Met: [] Adjusted    Therapist goals for Patient:   Short Term Goals: To be achieved in: 2 weeks  1. Independent in HEP and progression per patient tolerance, in order to prevent re-injury. [] Progressing: [x] Met: [] Not Met: [] Adjusted  2. Patient will have a decrease in pain to facilitate improvement in movement, function, and ADLs as indicated by Functional Deficits. [] Progressing: [x] Met: [] Not Met: [] Adjusted    Long Term Goals: To be achieved in: 16 weeks  1. Disability index score of 15% or less for the UEFI to assist with reaching prior level of function. [x] Progressing: [] Met: [] Not Met: [] Adjusted  2. Patient will demonstrate increased R shoulder flexion, abduction, ER, and IR AROM to >155°, >155°, T2, and T10 respectively to allow for proper joint functioning as indicated by patients Functional Deficits. [x] Progressing: [] Met: [] Not Met: [] Adjusted  3. Patient will demonstrate an increase in R shoulder strength in all planes  to 4+/5 or greater to allow for proper functional mobility as indicated by patients Functional Deficits. [x] Progressing: [] Met: [] Not Met: [] Adjusted  4. Patient will return to all functional activities without increased symptoms or restriction. [x] Progressing: [] Met: [] Not Met: [] Adjusted  5. Patient will be able to complete all bathing and dressing ADLs without pain or compensation so that she may return to PLOF for all ADLs. [x] Progressing: [] Met: [] Not Met: [] Adjusted        Overall Progression Towards Functional goals/ Treatment Progress Update:  [x] Patient is progressing as expected towards functional goals listed. [] Progression is slowed due to complexities/Impairments listed. [] Progression has been slowed due to co-morbidities.   [] Plan just implemented, too soon to assess goals progression <30days [] Goals require adjustment due to lack of progress  [] Patient is not progressing as expected and requires additional follow up with physician  [] Other    Prognosis for POC: [x] Good [] Fair  [] Poor      Patient requires continued skilled intervention: [x] Yes  [] No    Treatment/Activity Tolerance:  [x] Patient able to complete treatment  [] Patient limited by fatigue  [] Patient limited by pain    [] Patient limited by other medical complications  [] Other:           PLAN: 1-2x/week for 6 weeks  [x] Continue per plan of care [] Alter current plan   [] Plan of care initiated [] Hold pending MD visit [] Discharge      Electronically signed by:  Lourdes Stringer, PT, DPT, OMT-C      [facility] PT license: [id number]  [place] PT license: [id number]      Note: If patient does not return for scheduled/ recommended follow up visits, this note will serve as a discharge from care along with most recent update on progress.

## 2020-07-02 ENCOUNTER — OFFICE VISIT (OUTPATIENT)
Dept: ORTHOPEDIC SURGERY | Age: 55
End: 2020-07-02

## 2020-07-02 ENCOUNTER — TREATMENT (OUTPATIENT)
Dept: PHYSICAL THERAPY | Age: 55
End: 2020-07-02
Payer: COMMERCIAL

## 2020-07-02 VITALS — HEIGHT: 67 IN | BODY MASS INDEX: 28.24 KG/M2 | WEIGHT: 179.9 LBS

## 2020-07-02 PROCEDURE — 99024 POSTOP FOLLOW-UP VISIT: CPT | Performed by: ORTHOPAEDIC SURGERY

## 2020-07-02 PROCEDURE — 97140 MANUAL THERAPY 1/> REGIONS: CPT | Performed by: PHYSICAL THERAPIST

## 2020-07-02 PROCEDURE — 97110 THERAPEUTIC EXERCISES: CPT | Performed by: PHYSICAL THERAPIST

## 2020-07-02 PROCEDURE — 97112 NEUROMUSCULAR REEDUCATION: CPT | Performed by: PHYSICAL THERAPIST

## 2020-07-02 NOTE — PROGRESS NOTES
Joe Galvan [facility]   Phone: [phone]    Fax: [phone]        Physical Therapy Treatment Note/ Progress Report:           Date:  2020    Patient Name:  Sruthi Denis    :  1965  MRN: <[id number]>  Restrictions/Precautions:    Medical/Treatment Diagnosis Information:  · Diagnosis: R shoulder OA s/p TSA  · DOS:   Insurance/Certification information:  PT Insurance Information: BCBS  Physician Information:  Referring Practitioner: Dr. Aryan Vargas  Has the plan of care been signed (Y/N):        [x]  Yes  []  No  (sent 2020)    Date of Patient follow up with Physician: 2020      Is this a Progress Report:     []  Yes  [x]  No        If Yes:  Date Range for reporting period:  Beginnin2020    Endin2020    Progress report will be due (10 Rx or 30 days whichever is less): 3/55/2631      Recertification will be due (POC Duration  / 90 days whichever is less): 2020        Visit # Insurance Allowable Auth Required   7 90 (hard max) []  Yes [x]  No        Functional Scale: UEFI: 40/80, 50% limitaiton   Date assessed: 2020     Latex Allergy:  [x]NO      []YES  Preferred Language for Healthcare:   [x]English       []other:      Pain level:  0-4/10     SUBJECTIVE:  Patient reports that R shoulder is doing well overall. She notes that she did have an instance when walking last week where she had increased pain in the R UT, along the top of her R shoulder. She notes that she iced immediately following her walk and pain subsided and has not returned.     6+ weeks postop    OBJECTIVE: See eval       ROM PROM AROM  Comment:  2020    L R L R    Flexion  130° (table walk back) 160° NT    Abduction   168° NT    ER  35° (wand ER) T3 NT    IR   T9 NT    Elbow Flex        Elbow Ext            Strength: deferred secondary to surgery L R Comment:  2020       Special Tests Results/Comment: deferred secondary to surgery:  2020 verbal/tactile cueing for activities related to improving balance, coordination, kinesthetic sense, posture, motor skill, proprioception  to assist with  scapular, scapulothoracic and UE control with self care, reaching, carrying, lifting, house/yardwork, driving/computer work. Therapeutic Activities:    [] (52713 or 47357) Provided verbal/tactile cueing for activities related to improving balance, coordination, kinesthetic sense, posture, motor skill, proprioception and motor activation to allow for proper function of scapular, scapulothoracic and UE control with self care, carrying, lifting, driving/computer work. Home Exercise Program:    [x] (07584) Reviewed/Progressed HEP activities related to strengthening, flexibility, endurance, ROM of scapular, scapulothoracic and UE control with self care, reaching, carrying, lifting, house/yardwork, driving/computer work  [] (90255) Reviewed/Progressed HEP activities related to improving balance, coordination, kinesthetic sense, posture, motor skill, proprioception of scapular, scapulothoracic and UE control with self care, reaching, carrying, lifting, house/yardwork, driving/computer work      Manual Treatments:  PROM / STM / Oscillations-Mobs:  G-I, II, III, IV (PA's, Inf., Post.)  [x] (64387) Provided manual therapy to mobilize soft tissue/joints of cervical/CT, scapular GHJ and UE for the purpose of modulating pain, promoting relaxation,  increasing ROM, reducing/eliminating soft tissue swelling/inflammation/restriction, improving soft tissue extensibility and allowing for proper ROM for normal function with self care, reaching, carrying, lifting, house/yardwork, driving/computer work    Modalities:     [] GAME READY (VASO)- for significant edema, swelling, pain control.   [x] CP to R shoulder x10'    Charges:  Timed Code Treatment Minutes: 40   Total Treatment Minutes: 50      [] EVAL (LOW) 65073 (typically 20 minutes face-to-face)  [] EVAL (MOD) 19212 (typically 30 minutes face-to-face)  [] EVAL (HIGH) 53700 (typically 45 minutes face-to-face)  [] RE-EVAL     [x] YK(77712) x  1   [] IONTO  [x] NMR (72340) x 1     [] VASO  [x] Manual (21162) x1     [] Other:  [] TA x      [] Mech Traction (56069)  [] ES(attended) (31071)      [] ES (un) (04335):         ASSESSMENT:  Patient continues to demonstrate improvements in passive ROM of R shoulder. Muscle guarding is decreasing with manual ER stretching by PT, but increased muscle guarding is noted with initiation of manual flexion PROM stretching today. Weight was added to bicep curls today as patient is now >6 weeks postop, and she is able to tolerate with this progression with no increased pain in R bicep. Patient is following up with Dr. Alejandro Cabrera immediately following today's session. GOALS:  Patient stated goal: Able to regain ROM and perform all tasks without pain  [x] Progressing: [] Met: [] Not Met: [] Adjusted    Therapist goals for Patient:   Short Term Goals: To be achieved in: 2 weeks  1. Independent in HEP and progression per patient tolerance, in order to prevent re-injury. [] Progressing: [x] Met: [] Not Met: [] Adjusted  2. Patient will have a decrease in pain to facilitate improvement in movement, function, and ADLs as indicated by Functional Deficits. [] Progressing: [x] Met: [] Not Met: [] Adjusted    Long Term Goals: To be achieved in: 16 weeks  1. Disability index score of 15% or less for the UEFI to assist with reaching prior level of function. [x] Progressing: [] Met: [] Not Met: [] Adjusted  2. Patient will demonstrate increased R shoulder flexion, abduction, ER, and IR AROM to >155°, >155°, T2, and T10 respectively to allow for proper joint functioning as indicated by patients Functional Deficits. [x] Progressing: [] Met: [] Not Met: [] Adjusted  3.  Patient will demonstrate an increase in R shoulder strength in all planes  to 4+/5 or greater to allow for proper functional mobility as

## 2020-07-02 NOTE — PROGRESS NOTES
History of Present Illness:  Alpesh Boogie is a 62 y.o. female who presents for a post operative visit. The patient underwent a right anatomic shoulder arthroplasty on 5/19/2020. She has been compliant with wearing the UltraSling brace at all times. She continues to go to physical therapy with lives at the [place] office. She is off of all her pain medications at this point. The patient denies any fevers, chills, numbness, tingling, and shortness of breath. Medical History:  Patient's medications, allergies, past medical, surgical, social and family histories were reviewed and updated as appropriate. Review of Systems  No changes in medical since last visit    Review of Systems  A 14 point review of systems was completed by the patient is available in the media section of the scanned medical record and was reviewed on 7/2/2020. Vital Signs: There were no vitals filed for this visit. General/Appearance: Alert and oriented and in no apparent distress. Skin:  There are no skin lesions, cellulitis, or extreme edema. The patient has warm and well-perfused Bilateral upper extremities with brisk capillary refill.      right Shoulder Exam:    Inspection: right shoulder incision that is clean, dry and intact and well approximated. There is no erythema, drainage or other signs of infection    Palpation:  No crepitus to gentle motion    Active Range of Motion: Forward elevation 40 degrees external rotation of 20 degrees    Passive Range of Motion: Passively forward elevation can be further increased to 120    Strength: Positive [place]    Special Tests:  Deferred. Neurovascular: Sensation to light touch is intact, no motor deficits, palpable radial pulses 2+    Radiology:     Plain radiographs of the right shoulder comprising 2 views: AP and axillary lateral were obtained and reviewed in the office: Shows postsurgical changes from the right total shoulder replacement.   All the components are in

## 2020-07-02 NOTE — LETTER
Physical Therapy Rehabilitation Referral    Patient Name:  Enrico Sherman      YOB: 1965    Diagnosis:    1. Status post total replacement of right shoulder        Precautions: subscapularis     [x] Evaluate and Treat    Post Op Instructions:  [] Continuous passive motion (CPM) [x] Elbow ROM  [x] Exercise in plane of scapula  [x]  Strengthening     [] Pulley and instruction   [x] Home exercise program (copy to patient)   [] Sling when arm at risk  [] Sling or brace at all times   [x] AAROM: Forward elevation to  140            [x] AAROM: External rotation  To  40    [] Isometric external rotator strengthening [x] AAROM: internal rotation: up the back  [x] Isometric abductor strengthening  [x] AAROM: Internal abduction   [] Isometric internal rotator strengthening [x] AAROM: cross-body adduction             Stretching:     Strengthening:  [] Four quadrant (FE, ER, IR, CBA)  [] Rotator cuff (ER, IR, Abd)  [x] Forward Elevation    [] External Rotators     [x] External Rotation    [] Internal Rotators  [] Internal Rotation: up/back   [] Abductors     [] Internal Rotation: supine in abduction  [] Sleeper Stretch    [] Flexors  [] Cross-body abduction    [] Extensors  [x] Pendulum (FE, Abd/Add, cw/ccw)  [x] Scapular Stabilizers   [x] Wall-walking (FE, Abd)        [x] Shoulder shrugs     [x] Table slides (FE)                [x] Rhomboid pinch  [] Elbow (flex, ext, pron, sup)        [] Lat.  Pull downs     [] Medial epicondylitis program       [] Forward punch   [] Lateral epicondylitis program       [] Internal rotators     [] Progressive resistive exercises  [] Bench Press        [] Bench press plus  Activities:     [] Lateral pull-downs  [] Rowing     [] Progressive two-hand supine press  [] Stepper/Exercise bike   [x] Biceps: curls/supination  [] Swimming  [] Water exercises    Modalities:     Return to Sport:  [x] Of Choice      [] Plyometrics  [] Ultrasound     [] Rhythmic stabilization [] Iontophoresis    [] Core strengthening   [] Moist heat     [] Sports specific program:   [] Massage         [x] Cryotherapy      [] Electrical stimulation     [] Paraffin  [] Whirlpool  [] TENS    [x] Home exercise program (copy to patient). Perform exercises for:   15     minutes    3      times/day  [x] Supervised physical therapy  Frequency: []  1x week  [x] 2x week  [] 3x week  [] Other:   Duration: [] 2 weeks   [] 4 weeks  [x] 6 weeks  [] Other:     Additional Instructions:     Jose F eRd MD, PhD

## 2020-07-10 ENCOUNTER — TREATMENT (OUTPATIENT)
Dept: PHYSICAL THERAPY | Age: 55
End: 2020-07-10
Payer: COMMERCIAL

## 2020-07-10 PROCEDURE — 97110 THERAPEUTIC EXERCISES: CPT | Performed by: PHYSICAL THERAPIST

## 2020-07-10 PROCEDURE — 97530 THERAPEUTIC ACTIVITIES: CPT | Performed by: PHYSICAL THERAPIST

## 2020-07-10 PROCEDURE — 97140 MANUAL THERAPY 1/> REGIONS: CPT | Performed by: PHYSICAL THERAPIST

## 2020-07-10 PROCEDURE — 97112 NEUROMUSCULAR REEDUCATION: CPT | Performed by: PHYSICAL THERAPIST

## 2020-07-10 NOTE — PROGRESS NOTES
Joe Galvan [facility]   Phone: [phone]    Fax: [phone]        Physical Therapy Treatment Note/ Progress Report:           Date:  7/10/2020    Patient Name:  Matthew Loredo    :  1965  MRN: <[id number]>  Restrictions/Precautions:    Medical/Treatment Diagnosis Information:  · Diagnosis: R shoulder OA s/p TSA  · DOS:   Insurance/Certification information:  PT Insurance Information: [facility]  Physician Information:  Referring Practitioner: Dr. Yara Bergeron  Has the plan of care been signed (Y/N):        [x]  Yes  []  No  (sent 2020)    Date of Patient follow up with Physician: 2020      Is this a Progress Report:     []  Yes  [x]  No        If Yes:  Date Range for reporting period:  Beginnin2020    Endin2020    Progress report will be due (10 Rx or 30 days whichever is less):       Recertification will be due (POC Duration  / 90 days whichever is less): 2020        Visit # Insurance Allowable Auth Required   8 90 (hard max) []  Yes [x]  No        Functional Scale: UEFI: 40/80, 50% limitaiton   Date assessed: 2020     Latex Allergy:  [x]NO      []YES  Preferred Language for Healthcare:   [x]English       []other:      Pain level:  0-4/10     SUBJECTIVE:  Patient reports that she feels she is doing really well. She feels she has turned a corner in regards to the R shoulder and things are really starting to improve. Patient states that sleeping is improving, and the aching at night is becoming less. She notes that Dr. Yara Bergeron was pleased with how she is progressing.     7+ weeks postop    OBJECTIVE: See eval       ROM PROM AROM  Comment:  2020    L R L R    Flexion  130° (table walk back) 160° NT    Abduction   168° NT    ER  35° (wand ER) T3 NT    IR   T9 NT    Elbow Flex        Elbow Ext            Strength: deferred secondary to surgery L R Comment:  2020       Special Tests Results/Comment: deferred secondary to minutes face-to-face)  [] EVAL (MOD) 69430 (typically 30 minutes face-to-face)  [] EVAL (HIGH) 03758 (typically 45 minutes face-to-face)  [] RE-EVAL     [x] XF(43813) x  1 (18') [] IONTO  [x] NMR (21061) x 1  (12')  [] VASO  [x] Manual (67836) x1   (8')  [] Other:  [x] TA x 1  (18')  [] Mech Traction (38804)  [] ES(attended) (13345)      [] ES (un) (91111):         ASSESSMENT:  Wall slides and BB adduction stretches were added to patient's program today per updated postop protocol. Patient demonstrates good ROM and tolerance to wall slides, but does report fatigue as she reaches the 9th and 10th reps. She reports increased soreness and significant tightness with introduction of BB adduction with strap. Patient maintains good form with new and previous exercises with minimal cuing from PT.      GOALS:  Patient stated goal: Able to regain ROM and perform all tasks without pain  [x] Progressing: [] Met: [] Not Met: [] Adjusted    Therapist goals for Patient:   Short Term Goals: To be achieved in: 2 weeks  1. Independent in HEP and progression per patient tolerance, in order to prevent re-injury. [] Progressing: [x] Met: [] Not Met: [] Adjusted  2. Patient will have a decrease in pain to facilitate improvement in movement, function, and ADLs as indicated by Functional Deficits. [] Progressing: [x] Met: [] Not Met: [] Adjusted    Long Term Goals: To be achieved in: 16 weeks  1. Disability index score of 15% or less for the UEFI to assist with reaching prior level of function. [x] Progressing: [] Met: [] Not Met: [] Adjusted  2. Patient will demonstrate increased R shoulder flexion, abduction, ER, and IR AROM to >155°, >155°, T2, and T10 respectively to allow for proper joint functioning as indicated by patients Functional Deficits. [x] Progressing: [] Met: [] Not Met: [] Adjusted  3.  Patient will demonstrate an increase in R shoulder strength in all planes  to 4+/5 or greater to allow for proper functional mobility as indicated by patients Functional Deficits. [x] Progressing: [] Met: [] Not Met: [] Adjusted  4. Patient will return to all functional activities without increased symptoms or restriction. [x] Progressing: [] Met: [] Not Met: [] Adjusted  5. Patient will be able to complete all bathing and dressing ADLs without pain or compensation so that she may return to PLOF for all ADLs. [x] Progressing: [] Met: [] Not Met: [] Adjusted        Overall Progression Towards Functional goals/ Treatment Progress Update:  [x] Patient is progressing as expected towards functional goals listed. [] Progression is slowed due to complexities/Impairments listed. [] Progression has been slowed due to co-morbidities. [] Plan just implemented, too soon to assess goals progression <30days   [] Goals require adjustment due to lack of progress  [] Patient is not progressing as expected and requires additional follow up with physician  [] Other    Prognosis for POC: [x] Good [] Fair  [] Poor      Patient requires continued skilled intervention: [x] Yes  [] No    Treatment/Activity Tolerance:  [x] Patient able to complete treatment  [] Patient limited by fatigue  [] Patient limited by pain    [] Patient limited by other medical complications  [] Other:           PLAN: 1-2x/week for 6 weeks  [x] Continue per plan of care [] Alter current plan   [] Plan of care initiated [] Hold pending MD visit [] Discharge      Electronically signed by:  Armin Camejo, PT, DPT, OMT-C      [id number] [address] [facility] [employer] [place] [facility] license: [id number]  [place] PT license: [id number]      Note: If patient does not return for scheduled/ recommended follow up visits, this note will serve as a discharge from care along with most recent update on progress.

## 2020-07-16 ENCOUNTER — TREATMENT (OUTPATIENT)
Dept: PHYSICAL THERAPY | Age: 55
End: 2020-07-16
Payer: COMMERCIAL

## 2020-07-16 PROCEDURE — 97530 THERAPEUTIC ACTIVITIES: CPT | Performed by: PHYSICAL THERAPIST

## 2020-07-16 PROCEDURE — 97110 THERAPEUTIC EXERCISES: CPT | Performed by: PHYSICAL THERAPIST

## 2020-07-16 PROCEDURE — 97140 MANUAL THERAPY 1/> REGIONS: CPT | Performed by: PHYSICAL THERAPIST

## 2020-07-16 NOTE — PROGRESS NOTES
[facility]   Phone: [phone]    Fax: [phone]        Physical Therapy Treatment Note/ Progress Report:           Date:  2020    Patient Name:  Darrell Patel    :  1965  MRN: <[id number]>  Restrictions/Precautions:    Medical/Treatment Diagnosis Information:  · Diagnosis: R shoulder OA s/p TSA  · DOS:   Insurance/Certification information:  PT Insurance Information: [facility]  Physician Information:  Referring Practitioner: Dr. Alejandro Carbera  Has the plan of care been signed (Y/N):        [x]  Yes  []  No  (sent 2020)    Date of Patient follow up with Physician: 2020      Is this a Progress Report:     []  Yes  [x]  No        If Yes:  Date Range for reporting period:  Beginnin2020    Endin2020    Progress report will be due (10 Rx or 30 days whichever is less):       Recertification will be due (POC Duration  / 90 days whichever is less): 2020        Visit # Insurance Allowable Auth Required   9 90 (hard max) []  Yes [x]  No        Functional Scale: UEFI: 40/80, 50% limitaiton   Date assessed: 2020     Latex Allergy:  [x]NO      []YES  Preferred Language for Healthcare:   [x]English       []other:      Pain level:  0-4/10     SUBJECTIVE:  Patient reports R shoulder is doing well. She notes that she started adding wall slides to her HEP. She notes that it was very challenging and she is still struggling with this exercise. Patient notes that she tripped going up the steps the other day and caught part of her weight on the R UE. Though she was a little sore she notes no increased pain in the R shoulder.     7+ weeks postop    OBJECTIVE: See eval       ROM PROM AROM  Comment:  2020    L R L R    Flexion  130° (table walk back) 160° NT    Abduction   168° NT    ER  35° (wand ER) T3 NT    IR   T9 NT    Elbow Flex        Elbow Ext            Strength: deferred secondary to surgery L R Comment:  2020 Special Tests Results/Comment: deferred secondary to surgery:  6/25/2020        RESTRICTIONS/PRECAUTIONS: R TSA    Exercises/Interventions:     Therapeutic Ex (61279) Sets/sec Reps Notes/CUES   AD UE BIKE            STRETCHING/ROM      Pulleys 10\" x10 Flex and scap   Table Slides 10\" x10 Flex to max of 140°   Wand: seated 10\" x10 ER at 0° abd to 40°   Supine wand ER 10\" x10 ER at 20° abd to 40°   UE [place] 10\" x10 Flex and scap   Pendulum  x30 F/b, s/s   Table walk back 10\" x10    Doorway      Wall Slides 10\" x10    CBA      BB adduction 10\" x10    Sleeper       Elbow AROM 2# x20    Wrist 4 way 2# x20    Upper trap stretch 10\" x10    Levator stretch 10\" x10          ISOMETRICS      Gripping Red, 3'     Finger ext: rubber band 2'     Shrugs 3-5\" x20    Retraction 3-5\" x20    Abduction 10\" x10    Flexion      Internal Rotation      External Rotation            STRENGTHENING-PREs      Flexion      Abduction      Internal Rotation      External Rotation      Shrugs      Biceps      Triceps      Retraction      Extension      Horizontal Abduction in ER      Serratus                        THERABANDS/CABLE COLUMN      Rows Green, 2 x10    Lats      Extension Green, 2 x10    Internal Rotation      External Rotation      Biceps      Triceps      PNF                                    Manual Intervention (41051)      Scar Massage          Hawkgrips      GH joint mobilizations      Foamroll      Manual PROM: R shoulder ER and flex 8'           NMR re-education (08922)   CUES NEEDED   Plyoback      Therabar oscillations      Body Blade       Rhythmic Stabilization      Ball on the wall                              Therapeutic Activity (10648)      Core training      Swiss ball activities      Education                              Therapeutic Exercise and NMR EXR  [x] (45883) Provided verbal/tactile cueing for activities related to strengthening, flexibility, endurance, ROM  for improvements in scapular, scapulothoracic and UE control with self care, reaching, carrying, lifting, house/yardwork, driving/computer work. [x] (13895) Provided verbal/tactile cueing for activities related to improving balance, coordination, kinesthetic sense, posture, motor skill, proprioception  to assist with  scapular, scapulothoracic and UE control with self care, reaching, carrying, lifting, house/yardwork, driving/computer work. Therapeutic Activities:    [] (41530 or 22749) Provided verbal/tactile cueing for activities related to improving balance, coordination, kinesthetic sense, posture, motor skill, proprioception and motor activation to allow for proper function of scapular, scapulothoracic and UE control with self care, carrying, lifting, driving/computer work. Home Exercise Program:    [x] (49147) Reviewed/Progressed HEP activities related to strengthening, flexibility, endurance, ROM of scapular, scapulothoracic and UE control with self care, reaching, carrying, lifting, house/yardwork, driving/computer work  [] (28097) Reviewed/Progressed HEP activities related to improving balance, coordination, kinesthetic sense, posture, motor skill, proprioception of scapular, scapulothoracic and UE control with self care, reaching, carrying, lifting, house/yardwork, driving/computer work      Manual Treatments:  PROM / STM / Oscillations-Mobs:  G-I, II, III, IV (PA's, Inf., Post.)  [x] (46429) Provided manual therapy to mobilize soft tissue/joints of cervical/CT, scapular GHJ and UE for the purpose of modulating pain, promoting relaxation,  increasing ROM, reducing/eliminating soft tissue swelling/inflammation/restriction, improving soft tissue extensibility and allowing for proper ROM for normal function with self care, reaching, carrying, lifting, house/yardwork, driving/computer work    Modalities:     [] GAME READY (VASO)- for significant edema, swelling, pain control.   [x] CP to R shoulder x10'    Charges:  Timed Code Treatment Minutes:

## 2020-07-23 ENCOUNTER — TREATMENT (OUTPATIENT)
Dept: PHYSICAL THERAPY | Age: 55
End: 2020-07-23
Payer: COMMERCIAL

## 2020-07-23 PROCEDURE — 97110 THERAPEUTIC EXERCISES: CPT | Performed by: PHYSICAL THERAPIST

## 2020-07-23 PROCEDURE — 97112 NEUROMUSCULAR REEDUCATION: CPT | Performed by: PHYSICAL THERAPIST

## 2020-07-23 PROCEDURE — 97140 MANUAL THERAPY 1/> REGIONS: CPT | Performed by: PHYSICAL THERAPIST

## 2020-07-23 PROCEDURE — 97530 THERAPEUTIC ACTIVITIES: CPT | Performed by: PHYSICAL THERAPIST

## 2020-07-23 NOTE — PROGRESS NOTES
[facility]   Phone: [phone]    Fax: [phone]   Physical Therapy Re-Certification Plan of Care    Dear Dr. Teodoro Chase,    We had the pleasure of treating the following patient for physical therapy services at [address] [place] [address]. A summary of our findings can be found in the updated assessment below. This includes our plan of care. If you have any questions or concerns regarding these findings, please do not hesitate to contact me at the office phone number checked above.   Thank you for the referral.     Physician Signature:________________________________Date:__________________  By signing above (or electronic signature), therapists plan is approved by physician      Overall Response to Treatment:   [x]Patient is responding well to treatment and improvement is noted with regards  to goals   []Patient should continue to improve in reasonable time if they continue HEP   []Patient has plateaued and is no longer responding to skilled PT intervention    []Patient is getting worse and would benefit from return to referring MD   []Patient unable to adhere to initial POC   []Other:           Physical Therapy Treatment Note/ Progress Report:           Date:  2020    Patient Name:  Marissa Camejo    :  1965  MRN: <[id number]>  Restrictions/Precautions:    Medical/Treatment Diagnosis Information:  · Diagnosis: R shoulder OA s/p TSA  · DOS:   Insurance/Certification information:  PT Insurance Information: BCBS  Physician Information:  Referring Practitioner: Dr. Teodoro Chase  Has the plan of care been signed (Y/N):        []  Yes  [x]  No  (sent 2020)    Date of Patient follow up with Physician: 2020      Is this a Progress Report:     [x]  Yes  []  No        If Yes:  Date Range for reporting period:  Beginnin2020  Update NPV  Endin2020    Progress report will be due (10 Rx or 30 days whichever is less): 0/35/4320      Recertification will be due (POC Duration  / 90 days whichever is less): 8/23/2020        Visit # Insurance Allowable Auth Required   10 90 (hard max) []  Yes [x]  No        Functional Scale: UEFI: 39/80, 51.25% limitaiton   Date assessed: 7/23/2020     Latex Allergy:  [x]NO      []YES  Preferred Language for Healthcare:   [x]English       []other:      Pain level:  0-4/10     SUBJECTIVE:  Patient reports R shoulder is doing pretty well today. She notes that she is a bit more stiff and achy this morning, but she attributes this to the rainy weather today.     9+ weeks postop    OBJECTIVE: See eval       ROM PROM AROM  Comment:  7/23/2020    L R L R    Flexion  160° (pulley) 160° 110°    Abduction   168° 75°    ER   T3 C5    IR   T9 L4    Elbow Flex        Elbow Ext            Strength: deferred secondary to surgery L R Comment:  7/23/2020       Special Tests Results/Comment: deferred secondary to surgery:  7/23/2020        RESTRICTIONS/PRECAUTIONS: R TSA    Exercises/Interventions:     Therapeutic Ex (95488) Sets/sec Reps Notes/CUES   AD UE BIKE            STRETCHING/ROM      Pulleys 10\" x10 Flex and scap   Table Slides 10\" x10 Flex to max of 140°   Wand: seated 10\" x10 ER at 0° abd to 40°   Supine wand ER 10\" x10 ER at 20° abd to 40°   UE [place] 10\" x10 Flex and scap   Pendulum  x30 F/b, s/s   Table walk back 10\" x10    Doorway      Wall Slides 10\" x10    CBA      BB adduction 10\" x10    Sleeper       Elbow AROM 3# x20    Wrist 4 way 3# x20    Upper trap stretch 10\" x10    Levator stretch 10\" x10          ISOMETRICS      Gripping blue, 3'     Finger ext: rubber band 2'     Shrugs 3-5\" x20    Retraction 3-5\" x20    Abduction 10\" x10    Flexion      Internal Rotation      External Rotation            STRENGTHENING-PREs      Flexion      Abduction      Internal Rotation      External Rotation      Shrugs      Biceps      Triceps      Retraction      Extension      Horizontal Abduction in ER      Serratus THERABANDS/CABLE COLUMN      Rows Green, 2 x10    Lats      Extension Green, 2 x10    Internal Rotation      External Rotation      Biceps      Triceps      PNF                                    Manual Intervention (81309)      Scar Massage          Hawkgrips      GH joint mobilizations      Foamroll      Manual PROM: R shoulder ER and flex 8'           NMR re-education (69235)   CUES NEEDED   Plyoback      Therabar oscillations      Body Blade       Rhythmic Stabilization      Ball on the wall                              Therapeutic Activity (37253)      Core training      Swiss ADCentricity activities      Education                              Therapeutic Exercise and NMR EXR  [x] (39217) Provided verbal/tactile cueing for activities related to strengthening, flexibility, endurance, ROM  for improvements in scapular, scapulothoracic and UE control with self care, reaching, carrying, lifting, house/yardwork, driving/computer work. [x] (13021) Provided verbal/tactile cueing for activities related to improving balance, coordination, kinesthetic sense, posture, motor skill, proprioception  to assist with  scapular, scapulothoracic and UE control with self care, reaching, carrying, lifting, house/yardwork, driving/computer work. Therapeutic Activities:    [] (68637 or 79883) Provided verbal/tactile cueing for activities related to improving balance, coordination, kinesthetic sense, posture, motor skill, proprioception and motor activation to allow for proper function of scapular, scapulothoracic and UE control with self care, carrying, lifting, driving/computer work.      Home Exercise Program:    [x] (11903) Reviewed/Progressed HEP activities related to strengthening, flexibility, endurance, ROM of scapular, scapulothoracic and UE control with self care, reaching, carrying, lifting, house/yardwork, driving/computer work  [] (84938) Reviewed/Progressed HEP activities related to improving balance, coordination, kinesthetic sense, posture, motor skill, proprioception of scapular, scapulothoracic and UE control with self care, reaching, carrying, lifting, house/yardwork, driving/computer work      Manual Treatments:  PROM / STM / Oscillations-Mobs:  G-I, II, III, IV (PA's, Inf., Post.)  [x] (56161) Provided manual therapy to mobilize soft tissue/joints of cervical/CT, scapular GHJ and UE for the purpose of modulating pain, promoting relaxation,  increasing ROM, reducing/eliminating soft tissue swelling/inflammation/restriction, improving soft tissue extensibility and allowing for proper ROM for normal function with self care, reaching, carrying, lifting, house/yardwork, driving/computer work    Modalities:     [] GAME READY (VASO)- for significant edema, swelling, pain control. [x] CP to R shoulder x10'    Charges:  Timed Code Treatment Minutes: 60   Total Treatment Minutes: 70      [] EVAL (LOW) 45814 (typically 20 minutes face-to-face)  [] EVAL (MOD) 58736 (typically 30 minutes face-to-face)  [] EVAL (HIGH) 80076 (typically 45 minutes face-to-face)  [] RE-EVAL     [x] FG(95436) x  1 (20') [] IONTO  [x] NMR (43964) x 1 (10')    [] VASO  [x] Manual (21862) x1   (8')  [] Other:  [x] TA x 1  (22')  [] Mech Traction (86359)  [] ES(attended) (75239)      [] ES (un) (57176):         ASSESSMENT:  Patient demonstrates good progress toward all LTGs previously set by PT. She demonstrates good gains in R shoulder AROM in all planes. Minimal pain is reported with AROM, but rather she notes muscle tightness and weakness. She demonstrates improved carry over and form with wall slides and TB activities today. Patient would benefit from continued skilled PT services to address remaining deficits in R shoulder AROM, strength, and function so that she may return to PLOF for all ADLs.       GOALS:  Patient stated goal: Able to regain ROM and perform all tasks without pain  [x] Progressing: [] Met: [] Not Met: [] Adjusted    Therapist goals for Patient:   Short Term Goals: To be achieved in: 2 weeks  1. Independent in HEP and progression per patient tolerance, in order to prevent re-injury. [] Progressing: [x] Met: [] Not Met: [] Adjusted  2. Patient will have a decrease in pain to facilitate improvement in movement, function, and ADLs as indicated by Functional Deficits. [] Progressing: [x] Met: [] Not Met: [] Adjusted    Long Term Goals: To be achieved in: 16 weeks  1. Disability index score of 15% or less for the UEFI to assist with reaching prior level of function. [x] Progressing: [] Met: [] Not Met: [] Adjusted  2. Patient will demonstrate increased R shoulder flexion, abduction, ER, and IR AROM to >155°, >155°, T2, and T10 respectively to allow for proper joint functioning as indicated by patients Functional Deficits. [x] Progressing: [] Met: [] Not Met: [] Adjusted  3. Patient will demonstrate an increase in R shoulder strength in all planes  to 4+/5 or greater to allow for proper functional mobility as indicated by patients Functional Deficits. [x] Progressing: [] Met: [] Not Met: [] Adjusted  4. Patient will return to all functional activities without increased symptoms or restriction. [x] Progressing: [] Met: [] Not Met: [] Adjusted  5. Patient will be able to complete all bathing and dressing ADLs without pain or compensation so that she may return to PLOF for all ADLs. [x] Progressing: [] Met: [] Not Met: [] Adjusted        Overall Progression Towards Functional goals/ Treatment Progress Update:  [x] Patient is progressing as expected towards functional goals listed. [] Progression is slowed due to complexities/Impairments listed. [] Progression has been slowed due to co-morbidities.   [] Plan just implemented, too soon to assess goals progression <30days   [] Goals require adjustment due to lack of progress  [] Patient is not progressing as expected and requires additional follow up with physician  [] Other    Prognosis for POC: [x] Good [] Fair  [] Poor      Patient requires continued skilled intervention: [x] Yes  [] No    Treatment/Activity Tolerance:  [x] Patient able to complete treatment  [] Patient limited by fatigue  [] Patient limited by pain    [] Patient limited by other medical complications  [] Other:           PLAN: 1-2x/week for 6 weeks  [x] Continue per plan of care [] Alter current plan   [] Plan of care initiated [] Hold pending MD visit [] Discharge      Electronically signed by:  Leslee Vasquez, PT, DPT, OCS, OMT-C        [id number] [facility] [employer] [facility] PT license: [id number]  [place] PT license: [id number]        Note: If patient does not return for scheduled/ recommended follow up visits, this note will serve as a discharge from care along with most recent update on progress.

## 2020-07-30 ENCOUNTER — OFFICE VISIT (OUTPATIENT)
Dept: ORTHOPEDIC SURGERY | Age: 55
End: 2020-07-30

## 2020-07-30 ENCOUNTER — TREATMENT (OUTPATIENT)
Dept: PHYSICAL THERAPY | Age: 55
End: 2020-07-30
Payer: COMMERCIAL

## 2020-07-30 VITALS — WEIGHT: 179 LBS | HEIGHT: 67 IN | BODY MASS INDEX: 28.09 KG/M2

## 2020-07-30 PROCEDURE — 99024 POSTOP FOLLOW-UP VISIT: CPT | Performed by: ORTHOPAEDIC SURGERY

## 2020-07-30 PROCEDURE — 97140 MANUAL THERAPY 1/> REGIONS: CPT | Performed by: PHYSICAL THERAPIST

## 2020-07-30 PROCEDURE — 97110 THERAPEUTIC EXERCISES: CPT | Performed by: PHYSICAL THERAPIST

## 2020-07-30 PROCEDURE — 97112 NEUROMUSCULAR REEDUCATION: CPT | Performed by: PHYSICAL THERAPIST

## 2020-07-30 PROCEDURE — 97530 THERAPEUTIC ACTIVITIES: CPT | Performed by: PHYSICAL THERAPIST

## 2020-07-30 NOTE — LETTER
Physical Therapy Rehabilitation Referral    Patient Name:  Mihir Bonilla      YOB: 1965    Diagnosis:    1. Status post total replacement of right shoulder        Precautions:     [x] Evaluate and Treat    Post Op Instructions:  [] Continuous passive motion (CPM) [] Elbow ROM  [x] Exercise in plane of scapula  []  Strengthening     [] Pulley and instruction   [x] Home exercise program (copy to patient)   [] Sling when arm at risk  [] Sling or brace at all times   [x] AROM: Forward elevation to as tolerated            [x] AROM: External rotation to as tolerated    [x] Isometric external rotator strengthening [x] AROM: internal rotation: up the back  [x] Isometric abductor strengthening  [x] AROM: Internal abduction   [x] Isometric internal rotator strengthening [x] AROM: cross-body adduction             Stretching:     Strengthening:  [x] Four quadrant (FE, ER, IR, CBA)  [x] Rotator cuff (ER, IR, Abd)  [x] Forward Elevation    [] External Rotators     [x] External Rotation    [] Internal Rotators  [x] Internal Rotation: up/back   [] Abductors     [x] Internal Rotation: supine in abduction  [x] Sleeper Stretch    [] Flexors  [x] Cross-body abduction    [] Extensors  [x] Pendulum (FE, Abd/Add, cw/ccw)  [x] Scapular Stabilizers   [x] Wall-walking (FE, Abd)        [x] Shoulder shrugs     [x] Table slides (FE)                [x] Rhomboid pinch  [] Elbow (flex, ext, pron, sup)        [] Lat.  Pull downs     [] Medial epicondylitis program       [] Forward punch   [] Lateral epicondylitis program       [] Internal rotators     [] Progressive resistive exercises  [] Bench Press        [] Bench press plus  Activities:     [] Lateral pull-downs  [] Rowing     [x] Progressive two-hand supine press  [] Stepper/Exercise bike   [x] Biceps: curls/supination  [] Swimming  [] Water exercises    Modalities:     Return to Sport:  [x] Of Choice      [] Plyometrics  [] Ultrasound     [] Rhythmic stabilization [] Iontophoresis    [] Core strengthening   [] Moist heat     [] Sports specific program:   [] Massage         [x] Cryotherapy      [] Electrical stimulation     [] Paraffin  [] Whirlpool  [] TENS    [x] Home exercise program (copy to patient). Perform exercises for:   15     minutes    3      times/day  [x] Supervised physical therapy  Frequency: []  1x week  [x] 2x week  [] 3x week  [] Other:   Duration: [] 2 weeks   [] 4 weeks  [x] 6 weeks  [] Other:     Additional Instructions:     Jose F Sumner MD, PhD

## 2020-07-30 NOTE — PROGRESS NOTES
[facility]  History and Physical  Shoulder Pain    Date:  2020    Name:  Esperanza Alejandro  Address:  [place] [address]    :  1965      Age:   62 y.o.    SSN:  [id number]      Medical Record Number:  <[id number]>    Reason for Visit:    Post-Op Check (Right Shoulder)      HPI:   Esperanza Alejandro is a 62 y.o. female who presents to our office today for follow up of the right shoulder pain. Patient underwent a right anatomic shoulder arthroplasty on 2020. She has been going to physical therapy at the [facility] office and working with Cora Molina. She reports overall her pain levels are decreasing every week. She is now able to sleep comfortably at nighttime. She reports that her motion is also progressing month-to-month from going to therapy. She is quite happy with her current progress. She denies any new injuries or setbacks. She denies any fevers, chills numbness tingling or shortness of breath. Pain Assessment  Location of Pain: Shoulder  Location Modifiers: Right  Severity of Pain: 1  Quality of Pain: Dull  Duration of Pain: Persistent  Frequency of Pain: Intermittent  Aggravating Factors: (only certain movements)  Limiting Behavior: Yes  Relieving Factors: Rest, Ice  Work-Related Injury: No  Are there other pain locations you wish to document?: No    Review of Systems    Patient has had no medical changes since last evaluated           Review of Systems:  A 14 point review of systems available in the scanned medical record as documented by the patient on 20. The review is negative with the exception of those things mentioned in the History of Present Illness and Past Medical History. Past Medical History:  Patient's medications, allergies, past medical, surgical, social and family histories were reviewed and updated as appropriate.     Allergies:  No Known Allergies    Physical Exam:  Vitals: Treat     Referral Reason:   Specialty Services Required     Requested Specialty:   Physical Therapy     Number of Visits Requested:   1       Plan:   Patient continues to progress well during her postoperative recovery. We recommend that she continue work on her in range motions with physical therapy and transition to active range of motion. We will also have her continue to progress in the strength program as well. Details of her therapy was placed in her chart today. All her questions were fully answered today. We will see her back in 4 weeks for a follow-up visit. 7/30/2020  11:45 AM      Eduardo Mccrary PA-C  Orthopaedic Sports Medicine Physician Assistant    During this examination, Eduardo ZIMMERMAN PA-C, functioned as a scribe for Dr. Nga Berger. This dictation was performed with a verbal recognition program (DRAGON) and it was checked for errors. It is possible that there are still dictated errors within this office note. If so, please bring any errors to my attention for an addendum. All efforts were made to ensure that this office note is accurate.  ________________  I, Dr. Nga Berger, personally performed the services described in this documentation as described by Eduardo Mccrary PA-C in my presence, and it is both accurate and complete. Jose F Johnson MD, PhD  7/30/2020

## 2020-07-30 NOTE — PROGRESS NOTES
Joe Galvan [facility]   Phone: [phone]    Fax: [phone]        Physical Therapy Treatment Note/ Progress Report:           Date:  2020    Patient Name:  Johny Leonardo    :  1965  MRN: <[id number]>  Restrictions/Precautions:    Medical/Treatment Diagnosis Information:  · Diagnosis: R shoulder OA s/p TSA  · DOS: 7573  Insurance/Certification information:  PT Insurance Information: BCBS  Physician Information:  Referring Practitioner: Dr. Hailee Liu  Has the plan of care been signed (Y/N):        [x]  Yes  []  No  (sent 2020)    Date of Patient follow up with Physician: 2020      Is this a Progress Report:     []  Yes  [x]  No        If Yes:  Date Range for reporting period:  Beginnin2020    Endin2020    Progress report will be due (10 Rx or 30 days whichever is less): 1049      Recertification will be due (POC Duration  / 90 days whichever is less): 2020        Visit # Insurance Allowable Auth Required   11 90 (hard max) []  Yes [x]  No        Functional Scale: UEFI: 39/80, 51.25% limitaiton   Date assessed: 2020     Latex Allergy:  [x]NO      []YES  Preferred Language for Healthcare:   [x]English       []other:      Pain level:  0-4/10     SUBJECTIVE:  Patient reports that R shoulder is doing well. She notes that she is still progressing, but recognizes that it is a progress and healing only occurs so fast. She notes that she is following up with Dr. Hailee Liu immediately following today's session.     10+ weeks postop    OBJECTIVE: See eval       ROM PROM AROM  Comment:  2020    L R L R    Flexion  160° (pulley) 160° 110°    Abduction   168° 75°    ER   T3 C5    IR   T9 L4    Elbow Flex        Elbow Ext            Strength: deferred secondary to surgery L R Comment:  2020       Special Tests Results/Comment: deferred secondary to surgery:  2020        RESTRICTIONS/PRECAUTIONS: R TSA    Exercises/Interventions: Therapeutic Ex (90881) Sets/sec Reps Notes/CUES   AD UE BIKE            STRETCHING/ROM      Pulleys 10\" x10 Flex and scap   Table Slides 10\" x10 Flex to max of 140°   Wand: seated 10\" x10 ER at 0° abd to 40°   Supine wand ER 10\" x10 ER at 20° abd to 40°   Supine wand flexion 10\" x10    UE [place] 10\" x10 Flex and scap   Pendulum  x30 F/b, s/s   Table walk back 10\" x10    Doorway      Wall Slides 10\" x10    CBA      BB adduction 10\" x10    Sleeper       Elbow AROM 4# x20    Wrist 4 way 4# x20    Upper trap stretch 10\" x10    Levator stretch 10\" x10          ISOMETRICS      Gripping blue, 3'     Finger ext: rubber band 2'     Shrugs 3-5\" x20    Retraction 3-5\" x20    Abduction 10\" x10    Flexion      Internal Rotation      External Rotation            STRENGTHENING-PREs      Flexion      Abduction      Internal Rotation      External Rotation      Shrugs      Biceps      Triceps      Retraction      Extension      Horizontal Abduction in ER      Serratus                        THERABANDS/CABLE COLUMN      Rows Blue, 2 x10    Lats      Extension Blue, 2 x10    Internal Rotation      External Rotation      Biceps      Triceps      PNF                                    Manual Intervention (64041)      Scar Massage          Hawkgrips      GH joint mobilizations      Foamroll      Manual PROM: R shoulder ER and flex 8'           NMR re-education (81261)   CUES NEEDED   Plyoback      Therabar oscillations      Body Blade       Rhythmic Stabilization      Ball on the wall                              Therapeutic Activity (42905)      Core training      Swiss Lee Silber activities      Education                              Therapeutic Exercise and NMR EXR  [x] (08399) Provided verbal/tactile cueing for activities related to strengthening, flexibility, endurance, ROM  for improvements in scapular, scapulothoracic and UE control with self care, reaching, carrying, lifting, house/yardwork, driving/computer work.     [x] (07644) Provided verbal/tactile cueing for activities related to improving balance, coordination, kinesthetic sense, posture, motor skill, proprioception  to assist with  scapular, scapulothoracic and UE control with self care, reaching, carrying, lifting, house/yardwork, driving/computer work. Therapeutic Activities:    [] (39373 or 66854) Provided verbal/tactile cueing for activities related to improving balance, coordination, kinesthetic sense, posture, motor skill, proprioception and motor activation to allow for proper function of scapular, scapulothoracic and UE control with self care, carrying, lifting, driving/computer work. Home Exercise Program:    [x] (33695) Reviewed/Progressed HEP activities related to strengthening, flexibility, endurance, ROM of scapular, scapulothoracic and UE control with self care, reaching, carrying, lifting, house/yardwork, driving/computer work  [] (89183) Reviewed/Progressed HEP activities related to improving balance, coordination, kinesthetic sense, posture, motor skill, proprioception of scapular, scapulothoracic and UE control with self care, reaching, carrying, lifting, house/yardwork, driving/computer work      Manual Treatments:  PROM / STM / Oscillations-Mobs:  G-I, II, III, IV (PA's, Inf., Post.)  [x] (63553) Provided manual therapy to mobilize soft tissue/joints of cervical/CT, scapular GHJ and UE for the purpose of modulating pain, promoting relaxation,  increasing ROM, reducing/eliminating soft tissue swelling/inflammation/restriction, improving soft tissue extensibility and allowing for proper ROM for normal function with self care, reaching, carrying, lifting, house/yardwork, driving/computer work    Modalities:     [] GAME READY (VASO)- for significant edema, swelling, pain control.   [x] CP to R shoulder x10'    Charges:  Timed Code Treatment Minutes: 60   Total Treatment Minutes: 70      [] EVAL (LOW) 75657 (typically 20 minutes face-to-face)  [] EVAL (MOD) 57714 (typically 30 minutes face-to-face)  [] EVAL (HIGH) 15892 (typically 45 minutes face-to-face)  [] RE-EVAL     [x] KT(50111) x  1 (20') [] IONTO  [x] NMR (97470) x 1 (10')    [] VASO  [x] Manual (97225) x1   (8')  [] Other:  [x] TA x 1  (22')  [] Mech Traction (82814)  [] ES(attended) (13630)      [] ES (un) (19516):         ASSESSMENT: Patient continues to exhibit moderate muscle guarding and tightness in the R shoulder noted during manual stretching by PT. She demonstrates improving R shoulder flexion ROM with pulleys and wall slides, but continues to have significant limitations with active flexion. PT educated patient that AROM with improve as we progress into strengthening phase of postop recovery when deemed appropriate per referring MD. Plan to progress activity per patient tolerance with continued emphasis on ROM. GOALS:  Patient stated goal: Able to regain ROM and perform all tasks without pain  [x] Progressing: [] Met: [] Not Met: [] Adjusted    Therapist goals for Patient:   Short Term Goals: To be achieved in: 2 weeks  1. Independent in HEP and progression per patient tolerance, in order to prevent re-injury. [] Progressing: [x] Met: [] Not Met: [] Adjusted  2. Patient will have a decrease in pain to facilitate improvement in movement, function, and ADLs as indicated by Functional Deficits. [] Progressing: [x] Met: [] Not Met: [] Adjusted    Long Term Goals: To be achieved in: 16 weeks  1. Disability index score of 15% or less for the UEFI to assist with reaching prior level of function. [x] Progressing: [] Met: [] Not Met: [] Adjusted  2. Patient will demonstrate increased R shoulder flexion, abduction, ER, and IR AROM to >155°, >155°, T2, and T10 respectively to allow for proper joint functioning as indicated by patients Functional Deficits. [x] Progressing: [] Met: [] Not Met: [] Adjusted  3.  Patient will demonstrate an increase in R shoulder strength in all planes  to 4+/5 or greater to allow for proper functional mobility as indicated by patients Functional Deficits. [x] Progressing: [] Met: [] Not Met: [] Adjusted  4. Patient will return to all functional activities without increased symptoms or restriction. [x] Progressing: [] Met: [] Not Met: [] Adjusted  5. Patient will be able to complete all bathing and dressing ADLs without pain or compensation so that she may return to PLOF for all ADLs. [x] Progressing: [] Met: [] Not Met: [] Adjusted        Overall Progression Towards Functional goals/ Treatment Progress Update:  [x] Patient is progressing as expected towards functional goals listed. [] Progression is slowed due to complexities/Impairments listed. [] Progression has been slowed due to co-morbidities. [] Plan just implemented, too soon to assess goals progression <30days   [] Goals require adjustment due to lack of progress  [] Patient is not progressing as expected and requires additional follow up with physician  [] Other    Prognosis for POC: [x] Good [] Fair  [] Poor      Patient requires continued skilled intervention: [x] Yes  [] No    Treatment/Activity Tolerance:  [x] Patient able to complete treatment  [] Patient limited by fatigue  [] Patient limited by pain    [] Patient limited by other medical complications  [] Other:           PLAN: 1-2x/week for 6 weeks  [x] Continue per plan of care [] Alter current plan   [] Plan of care initiated [] Hold pending MD visit [] Discharge      Electronically signed by:  Winter Diaz, PT, DPT, OCS, OMT-C        [id number] [facility] [employer] PT license: [id number]  [place] PT license: [id number]        Note: If patient does not return for scheduled/ recommended follow up visits, this note will serve as a discharge from care along with most recent update on progress.

## 2020-08-06 ENCOUNTER — TREATMENT (OUTPATIENT)
Dept: PHYSICAL THERAPY | Age: 55
End: 2020-08-06
Payer: COMMERCIAL

## 2020-08-06 PROCEDURE — 97140 MANUAL THERAPY 1/> REGIONS: CPT | Performed by: PHYSICAL THERAPIST

## 2020-08-06 PROCEDURE — 97110 THERAPEUTIC EXERCISES: CPT | Performed by: PHYSICAL THERAPIST

## 2020-08-06 PROCEDURE — 97530 THERAPEUTIC ACTIVITIES: CPT | Performed by: PHYSICAL THERAPIST

## 2020-08-06 PROCEDURE — 97112 NEUROMUSCULAR REEDUCATION: CPT | Performed by: PHYSICAL THERAPIST

## 2020-08-06 NOTE — PROGRESS NOTES
[facility]   Phone: [phone]    Fax: [phone]        Physical Therapy Treatment Note/ Progress Report:           Date:  2020    Patient Name:  Alex Miller    :  1965  MRN: <[id number]>  Restrictions/Precautions:    Medical/Treatment Diagnosis Information:  · Diagnosis: R shoulder OA s/p TSA  · DOS:   Insurance/Certification information:  PT Insurance Information: BCBS  Physician Information:  Referring Practitioner: Dr. Christopher Rangel  Has the plan of care been signed (Y/N):        [x]  Yes  []  No  (sent 2020)    Date of Patient follow up with Physician: 2020      Is this a Progress Report:     []  Yes  [x]  No        If Yes:  Date Range for reporting period:  Beginnin2020    Endin2020    Progress report will be due (10 Rx or 30 days whichever is less):       Recertification will be due (POC Duration  / 90 days whichever is less): 2020        Visit # Insurance Allowable Auth Required   12 90 (hard max) []  Yes [x]  No        Functional Scale: UEFI: 39/80, 51.25% limitaiton   Date assessed: 2020     Latex Allergy:  [x]NO      []YES  Preferred Language for Healthcare:   [x]English       []other:      Pain level:  0-4/10     SUBJECTIVE:  Patient reports R shoulder is doing well. She notes that at her follow up appointment last week, Dr. Christopher Rangel told her that her shoulder was still a little stiff and recommended coming to PT 2x/week.     11+ weeks postop    OBJECTIVE: See eval       ROM PROM AROM  Comment:  2020    L R L R    Flexion  160° (pulley) 160° 110°    Abduction   168° 75°    ER   T3 C5    IR   T9 L4    Elbow Flex        Elbow Ext            Strength: deferred secondary to surgery L R Comment:  2020       Special Tests Results/Comment: deferred secondary to surgery:  2020        RESTRICTIONS/PRECAUTIONS: R TSA    Exercises/Interventions:     Therapeutic Ex (00324) Sets/sec Reps verbal/tactile cueing for activities related to improving balance, coordination, kinesthetic sense, posture, motor skill, proprioception  to assist with  scapular, scapulothoracic and UE control with self care, reaching, carrying, lifting, house/yardwork, driving/computer work. Therapeutic Activities:    [] (94648 or 55066) Provided verbal/tactile cueing for activities related to improving balance, coordination, kinesthetic sense, posture, motor skill, proprioception and motor activation to allow for proper function of scapular, scapulothoracic and UE control with self care, carrying, lifting, driving/computer work. Home Exercise Program:    [x] (40175) Reviewed/Progressed HEP activities related to strengthening, flexibility, endurance, ROM of scapular, scapulothoracic and UE control with self care, reaching, carrying, lifting, house/yardwork, driving/computer work  [] (70688) Reviewed/Progressed HEP activities related to improving balance, coordination, kinesthetic sense, posture, motor skill, proprioception of scapular, scapulothoracic and UE control with self care, reaching, carrying, lifting, house/yardwork, driving/computer work      Manual Treatments:  PROM / STM / Oscillations-Mobs:  G-I, II, III, IV (PA's, Inf., Post.)  [x] (71216) Provided manual therapy to mobilize soft tissue/joints of cervical/CT, scapular GHJ and UE for the purpose of modulating pain, promoting relaxation,  increasing ROM, reducing/eliminating soft tissue swelling/inflammation/restriction, improving soft tissue extensibility and allowing for proper ROM for normal function with self care, reaching, carrying, lifting, house/yardwork, driving/computer work    Modalities:     [] GAME READY (VASO)- for significant edema, swelling, pain control.   [x] CP to R shoulder x10'    Charges:  Timed Code Treatment Minutes: 64   Total Treatment Minutes: 74      [] EVAL (LOW) 62215 (typically 20 minutes face-to-face)  [] EVAL (MOD) 69319 (typically 30 minutes face-to-face)  [] EVAL (HIGH) 19826 (typically 45 minutes face-to-face)  [] RE-EVAL     [x] [place](83795) x  1 (20') [] IONTO  [x] NMR (99603) x 1 (14')    [] VASO  [x] Manual (42884) x1   (8')  [] Other:  [x] TA x 1  (22')  [] Mech Traction (05217)  [] ES(attended) (11594)      [] ES (un) (29515):         ASSESSMENT: Progressions to patient program made today per MD recommendations with addition of doorway ER stretch today. Shoulder ER and IR isometrics and TB ER were added to patient's program as well today to begin RTC strengthening per postop protocol. Patient reports good stretch with doorway ER and mild fatigue with introduction of strengthening exercises. She requires minimal VCs to achieve proper form to initiate exercises, but is then able to maintain proper form without addition cues. Plan to progress to BBIR stretch with strap NPV. GOALS:  Patient stated goal: Able to regain ROM and perform all tasks without pain  [x] Progressing: [] Met: [] Not Met: [] Adjusted    Therapist goals for Patient:   Short Term Goals: To be achieved in: 2 weeks  1. Independent in HEP and progression per patient tolerance, in order to prevent re-injury. [] Progressing: [x] Met: [] Not Met: [] Adjusted  2. Patient will have a decrease in pain to facilitate improvement in movement, function, and ADLs as indicated by Functional Deficits. [] Progressing: [x] Met: [] Not Met: [] Adjusted    Long Term Goals: To be achieved in: 16 weeks  1. Disability index score of 15% or less for the UEFI to assist with reaching prior level of function. [x] Progressing: [] Met: [] Not Met: [] Adjusted  2. Patient will demonstrate increased R shoulder flexion, abduction, ER, and IR AROM to >155°, >155°, T2, and T10 respectively to allow for proper joint functioning as indicated by patients Functional Deficits. [x] Progressing: [] Met: [] Not Met: [] Adjusted  3.  Patient will demonstrate an increase in R shoulder strength

## 2020-08-10 ENCOUNTER — TREATMENT (OUTPATIENT)
Dept: PHYSICAL THERAPY | Age: 55
End: 2020-08-10
Payer: COMMERCIAL

## 2020-08-10 PROCEDURE — 97110 THERAPEUTIC EXERCISES: CPT | Performed by: PHYSICAL THERAPIST

## 2020-08-10 PROCEDURE — 97140 MANUAL THERAPY 1/> REGIONS: CPT | Performed by: PHYSICAL THERAPIST

## 2020-08-10 PROCEDURE — 97112 NEUROMUSCULAR REEDUCATION: CPT | Performed by: PHYSICAL THERAPIST

## 2020-08-10 PROCEDURE — 97530 THERAPEUTIC ACTIVITIES: CPT | Performed by: PHYSICAL THERAPIST

## 2020-08-10 NOTE — PROGRESS NOTES
Joe Galvan [facility]   Phone: [phone]    Fax: [phone]        Physical Therapy Treatment Note/ Progress Report:           Date:  8/10/2020    Patient Name:  Mary Anne Crawford    :  1965  MRN: <[id number]>  Restrictions/Precautions:    Medical/Treatment Diagnosis Information:  · Diagnosis: R shoulder OA s/p TSA  · DOS: 6113  Insurance/Certification information:  PT Insurance Information: [facility]  Physician Information:  Referring Practitioner: Dr. Nino Devine  Has the plan of care been signed (Y/N):        [x]  Yes  []  No  (sent 2020)    Date of Patient follow up with Physician: 2020      Is this a Progress Report:     []  Yes  [x]  No        If Yes:  Date Range for reporting period:  Beginnin2020    Endin2020    Progress report will be due (10 Rx or 30 days whichever is less):       Recertification will be due (POC Duration  / 90 days whichever is less): 2020        Visit # Insurance Allowable Auth Required   13 90 (hard max) []  Yes [x]  No        Functional Scale: UEFI: 39/80, 51.25% limitaiton   Date assessed: 2020     Latex Allergy:  [x]NO      []YES  Preferred Language for Healthcare:   [x]English       []other:      Pain level:  0-4/10     SUBJECTIVE:  Patient reports that she had moderate soreness following progressions last session, but notes that it was nothing lasting longer than 24 hours. She notes that she has noticed drastic improvements in R shoulder flexion and ER with supine wand stretches.     11+ weeks postop    OBJECTIVE: See eval       ROM PROM AROM  Comment:  2020    L R L R    Flexion  160° (pulley) 160° 110°    Abduction   168° 75°    ER   T3 C5    IR   T9 L4    Elbow Flex        Elbow Ext            Strength: deferred secondary to surgery L R Comment:  2020       Special Tests Results/Comment: deferred secondary to surgery:  2020        RESTRICTIONS/PRECAUTIONS: R TSA    Exercises/Interventions:     Therapeutic Ex (08626) Sets/sec Reps Notes/CUES   AD UE BIKE            STRETCHING/ROM      Pulleys 10\" x10 Flex and scap   Table Slides 10\" x10 Flex to max of 140°   Wand: seated 10\" x10 ER at 0° abd to 40°   Supine wand ER 10\" x10 ER at 20° abd to 40°   Supine wand flexion 10\" x10    UE [place] 10\" x10 Flex and scap   Pendulum  x30 F/b, s/s   Table walk back 10\" x10    Doorway 10\" x10    Wall Slides 10\" x10    CBA      BB adduction 10\" x10    BBIR 10\" x5    Sleeper       Elbow AROM 4# x20    Wrist 4 way 4# x20    Upper trap stretch 10\" x10    Levator stretch 10\" x10          ISOMETRICS      Gripping blue, 3'     Finger ext: rubber band 2'     Shrugs 3-5\" x20    Retraction 3-5\" x20    Abduction 10\" x10    Flexion      Internal Rotation 10\" x10    External Rotation 10\" x10          STRENGTHENING-PREs      Flexion      Abduction      Internal Rotation      External Rotation      Shrugs      Biceps      Triceps      Retraction      Extension      Horizontal Abduction in ER      Serratus                        THERABANDS/CABLE COLUMN      Rows Blue, 2 x10    Lats      Extension Blue, 2 x10    Internal Rotation      External Rotation [place], 2 x10    Biceps      Triceps      PNF                                    Manual Intervention (12977)      Scar Massage          Hawkgrips      GH joint mobilizations      Foamroll      Manual PROM: R shoulder ER and flex 8'           NMR re-education (06853)   CUES NEEDED   Plyoback      Therabar oscillations      Body Blade       Rhythmic Stabilization      Ball on the wall                              Therapeutic Activity (07216)      Core training      Swiss ball activities      Education                              Therapeutic Exercise and NMR EXR  [x] (93156) Provided verbal/tactile cueing for activities related to strengthening, flexibility, endurance, ROM  for improvements in scapular, scapulothoracic and UE control with self care, reaching, carrying, lifting, house/yardwork, driving/computer work. [x] (68728) Provided verbal/tactile cueing for activities related to improving balance, coordination, kinesthetic sense, posture, motor skill, proprioception  to assist with  scapular, scapulothoracic and UE control with self care, reaching, carrying, lifting, house/yardwork, driving/computer work. Therapeutic Activities:    [] (35112 or 82961) Provided verbal/tactile cueing for activities related to improving balance, coordination, kinesthetic sense, posture, motor skill, proprioception and motor activation to allow for proper function of scapular, scapulothoracic and UE control with self care, carrying, lifting, driving/computer work. Home Exercise Program:    [x] (59501) Reviewed/Progressed HEP activities related to strengthening, flexibility, endurance, ROM of scapular, scapulothoracic and UE control with self care, reaching, carrying, lifting, house/yardwork, driving/computer work  [] (29707) Reviewed/Progressed HEP activities related to improving balance, coordination, kinesthetic sense, posture, motor skill, proprioception of scapular, scapulothoracic and UE control with self care, reaching, carrying, lifting, house/yardwork, driving/computer work      Manual Treatments:  PROM / STM / Oscillations-Mobs:  G-I, II, III, IV (PA's, Inf., Post.)  [x] (57467) Provided manual therapy to mobilize soft tissue/joints of cervical/CT, scapular GHJ and UE for the purpose of modulating pain, promoting relaxation,  increasing ROM, reducing/eliminating soft tissue swelling/inflammation/restriction, improving soft tissue extensibility and allowing for proper ROM for normal function with self care, reaching, carrying, lifting, house/yardwork, driving/computer work    Modalities:     [] GAME READY (VASO)- for significant edema, swelling, pain control.   [x] CP to R shoulder x10'    Charges:  Timed Code Treatment Minutes: 60   Total Treatment Minutes: for proper functional mobility as indicated by patients Functional Deficits. [x] Progressing: [] Met: [] Not Met: [] Adjusted  4. Patient will return to all functional activities without increased symptoms or restriction. [x] Progressing: [] Met: [] Not Met: [] Adjusted  5. Patient will be able to complete all bathing and dressing ADLs without pain or compensation so that she may return to PLOF for all ADLs. [x] Progressing: [] Met: [] Not Met: [] Adjusted        Overall Progression Towards Functional goals/ Treatment Progress Update:  [x] Patient is progressing as expected towards functional goals listed. [] Progression is slowed due to complexities/Impairments listed. [] Progression has been slowed due to co-morbidities. [] Plan just implemented, too soon to assess goals progression <30days   [] Goals require adjustment due to lack of progress  [] Patient is not progressing as expected and requires additional follow up with physician  [] Other    Prognosis for POC: [x] Good [] Fair  [] Poor      Patient requires continued skilled intervention: [x] Yes  [] No    Treatment/Activity Tolerance:  [x] Patient able to complete treatment  [] Patient limited by fatigue  [] Patient limited by pain    [] Patient limited by other medical complications  [] Other:           PLAN: 1-2x/week for 6 weeks  [x] Continue per plan of care [] Alter current plan   [] Plan of care initiated [] Hold pending MD visit [] Discharge      Electronically signed by:  Rukhsana Knowles, PT, DPT, OCS, OMT-C        [place] PT license: [id number]  [place] PT license: [id number]        Note: If patient does not return for scheduled/ recommended follow up visits, this note will serve as a discharge from care along with most recent update on progress.

## 2020-08-13 ENCOUNTER — TREATMENT (OUTPATIENT)
Dept: PHYSICAL THERAPY | Age: 55
End: 2020-08-13
Payer: COMMERCIAL

## 2020-08-13 PROCEDURE — 97110 THERAPEUTIC EXERCISES: CPT | Performed by: PHYSICAL THERAPIST

## 2020-08-13 PROCEDURE — 97112 NEUROMUSCULAR REEDUCATION: CPT | Performed by: PHYSICAL THERAPIST

## 2020-08-13 PROCEDURE — 97140 MANUAL THERAPY 1/> REGIONS: CPT | Performed by: PHYSICAL THERAPIST

## 2020-08-13 PROCEDURE — 97530 THERAPEUTIC ACTIVITIES: CPT | Performed by: PHYSICAL THERAPIST

## 2020-08-13 NOTE — PROGRESS NOTES
10\" x10 Flex and scap   Table Slides 10\" x10 Flex to max of 140°   Wand: seated 10\" x10 ER at 0° abd to 40°   Supine wand ER 10\" x10 ER at 20° abd to 40°   Supine wand flexion 10\" x10    UE [place] 10\" x10 Flex and scap   Pendulum  x30 F/b, s/s   Table walk back 10\" x10    Doorway 10\" x10    Wall Slides 10\" x10    CBA      BB adduction 10\" x10    BBIR 10\" x5    Sleeper       Elbow AROM 5# x20    Wrist 4 way 5# x20    Upper trap stretch 10\" x10    Levator stretch 10\" x10          ISOMETRICS      Gripping blue, 3'     Finger ext: rubber band 2'     Shrugs 3-5\" x20    Retraction 3-5\" x20    Abduction 10\" x10    Flexion      Internal Rotation 10\" x10    External Rotation 10\" x10          STRENGTHENING-PREs      Flexion      Abduction      Internal Rotation      External Rotation      Shrugs      Biceps      Triceps      Retraction      Extension      Horizontal Abduction in ER      Serratus                        THERABANDS/CABLE COLUMN      Rows Blue, 2 x10    Lats      Extension Blue, 2 x10    Internal Rotation Green, 2 x10    External Rotation Orange, 2 x10    Biceps      Triceps      PNF                                    Manual Intervention (84212)      Scar Massage          Hawkgrips      GH joint mobilizations      Foamroll      Manual PROM: R shoulder ER and flex 8'           NMR re-education (03346)   CUES NEEDED   Plyoback      Therabar oscillations      Body Blade       Rhythmic Stabilization      Ball on the wall                              Therapeutic Activity (84424)      Core training      Swiss ball activities      Education                              Therapeutic Exercise and NMR EXR  [x] (31379) Provided verbal/tactile cueing for activities related to strengthening, flexibility, endurance, ROM  for improvements in scapular, scapulothoracic and UE control with self care, reaching, carrying, lifting, house/yardwork, driving/computer work.     [x] (73515) Provided verbal/tactile cueing for activities related to improving balance, coordination, kinesthetic sense, posture, motor skill, proprioception  to assist with  scapular, scapulothoracic and UE control with self care, reaching, carrying, lifting, house/yardwork, driving/computer work. Therapeutic Activities:    [] (65392 or 50328) Provided verbal/tactile cueing for activities related to improving balance, coordination, kinesthetic sense, posture, motor skill, proprioception and motor activation to allow for proper function of scapular, scapulothoracic and UE control with self care, carrying, lifting, driving/computer work. Home Exercise Program:    [x] (30955) Reviewed/Progressed HEP activities related to strengthening, flexibility, endurance, ROM of scapular, scapulothoracic and UE control with self care, reaching, carrying, lifting, house/yardwork, driving/computer work  [] (48434) Reviewed/Progressed HEP activities related to improving balance, coordination, kinesthetic sense, posture, motor skill, proprioception of scapular, scapulothoracic and UE control with self care, reaching, carrying, lifting, house/yardwork, driving/computer work      Manual Treatments:  PROM / STM / Oscillations-Mobs:  G-I, II, III, IV (PA's, Inf., Post.)  [x] (62271) Provided manual therapy to mobilize soft tissue/joints of cervical/CT, scapular GHJ and UE for the purpose of modulating pain, promoting relaxation,  increasing ROM, reducing/eliminating soft tissue swelling/inflammation/restriction, improving soft tissue extensibility and allowing for proper ROM for normal function with self care, reaching, carrying, lifting, house/yardwork, driving/computer work    Modalities:     [] GAME READY (VASO)- for significant edema, swelling, pain control.   [x] CP to R shoulder x10'    Charges:  Timed Code Treatment Minutes: 62   Total Treatment Minutes: 72      [] EVAL (LOW) 90451 (typically 20 minutes face-to-face)  [] EVAL (MOD) 15747 (typically 30 minutes face-to-face)  [] EVAL (HIGH) 87344 (typically 45 minutes face-to-face)  [] RE-EVAL     [x] XS(49113) x  1 (20') [] IONTO  [x] NMR (94511) x 1 (14')    [] VASO  [x] Manual (94443) x1   (8')  [] Other:  [x] TA x 1  (20')  [] Mech Traction (56492)  [] ES(attended) (54752)      [] ES (un) (29323):         ASSESSMENT: Patient demonstrates good carry over with isometric and TB ER exercises from last session. She demonstrates moderate fatigue with introduction of TB IR today, but reports no increased pain in R shoulder. She demonstrates continuous visible increases in R shoulder ER and flexion PROM throughout session. Plan to progress activity per patient tolerance and postop protocol. GOALS:  Patient stated goal: Able to regain ROM and perform all tasks without pain  [x] Progressing: [] Met: [] Not Met: [] Adjusted    Therapist goals for Patient:   Short Term Goals: To be achieved in: 2 weeks  1. Independent in HEP and progression per patient tolerance, in order to prevent re-injury. [] Progressing: [x] Met: [] Not Met: [] Adjusted  2. Patient will have a decrease in pain to facilitate improvement in movement, function, and ADLs as indicated by Functional Deficits. [] Progressing: [x] Met: [] Not Met: [] Adjusted    Long Term Goals: To be achieved in: 16 weeks  1. Disability index score of 15% or less for the UEFI to assist with reaching prior level of function. [x] Progressing: [] Met: [] Not Met: [] Adjusted  2. Patient will demonstrate increased R shoulder flexion, abduction, ER, and IR AROM to >155°, >155°, T2, and T10 respectively to allow for proper joint functioning as indicated by patients Functional Deficits. [x] Progressing: [] Met: [] Not Met: [] Adjusted  3. Patient will demonstrate an increase in R shoulder strength in all planes  to 4+/5 or greater to allow for proper functional mobility as indicated by patients Functional Deficits. [x] Progressing: [] Met: [] Not Met: [] Adjusted  4.  Patient will return to all

## 2020-08-20 ENCOUNTER — TREATMENT (OUTPATIENT)
Dept: PHYSICAL THERAPY | Age: 55
End: 2020-08-20
Payer: COMMERCIAL

## 2020-08-20 PROCEDURE — 97112 NEUROMUSCULAR REEDUCATION: CPT | Performed by: PHYSICAL THERAPIST

## 2020-08-20 PROCEDURE — 97110 THERAPEUTIC EXERCISES: CPT | Performed by: PHYSICAL THERAPIST

## 2020-08-20 PROCEDURE — 97140 MANUAL THERAPY 1/> REGIONS: CPT | Performed by: PHYSICAL THERAPIST

## 2020-08-20 PROCEDURE — 97530 THERAPEUTIC ACTIVITIES: CPT | Performed by: PHYSICAL THERAPIST

## 2020-08-20 NOTE — PROGRESS NOTES
[facility]   Phone: [phone]    Fax: [phone]          Physical Therapy Treatment Note/ Progress Report:           Date:  2020    Patient Name:  Arnaud Siddiqui    :  1965  MRN: <[id number]>  Restrictions/Precautions:    Medical/Treatment Diagnosis Information:  · Diagnosis: R shoulder OA s/p TSA  · DOS:   Insurance/Certification information:  PT Insurance Information: BCBS  Physician Information:  Referring Practitioner: Dr. Manuela Liao  Has the plan of care been signed (Y/N):        [x]  Yes  []  No      Date of Patient follow up with Physician: 2020      Is this a Progress Report:     []  Yes  [x]  No        If Yes:  Date Range for reporting period:  Beginnin2020    Endin2020    Progress report will be due (10 Rx or 30 days whichever is less):       Recertification will be due (POC Duration  / 90 days whichever is less): 2020        Visit # Insurance Allowable Auth Required   15 90 (hard max) []  Yes [x]  No        Functional Scale: UEFI: 39/80, 51.25% limitaiton   Date assessed: 2020     Latex Allergy:  [x]NO      []YES  Preferred Language for Healthcare:   [x]English       []other:      Pain level:  0-4/10     SUBJECTIVE:  Patient reports that R shoulder is doing very well. She notes continued compliance with HEP, and that she continues to see gains in ROM and function.     13+ weeks postop    OBJECTIVE: See eval       ROM PROM AROM  Comment:  2020    L R L R    Flexion  160° (p[place]ley) 160° 110°    Abduction   168° 75°    ER   T3 C5    IR   T9 L4    Elbow Flex        Elbow Ext            Strength: deferred secondary to surgery L R Comment:  2020       Special Tests Results/Comment: deferred secondary to surgery:  2020        RESTRICTIONS/PRECAUTIONS: R TSA    Exercises/Interventions:     Therapeutic Ex (67176) Sets/sec Reps Notes/CUES   AD UE BIKE            STRETCHING/ROM      [place] 10\" x10 Flex and scap   Table Slides 10\" x10 Flex to max of 140°   Wand: seated 10\" x10 ER at 0° abd to 40°   Supine wand ER 10\" x10 ER at 20° abd to 40°   Supine wand flexion 10\" x10    UE [place] 10\" x10 Flex and scap   Pendulum  x30 F/b, s/s   Table walk back 10\" x10    Doorway 10\" x10    Wall Slides 10\" x10    CBA      BB adduction 10\" x10    BBIR 10\" x5    Sleeper       Elbow AROM 5# x20    Wrist 4 way 5# x20    Upper trap stretch 10\" x10    Levator stretch 10\" x10          ISOMETRICS      Gripping blue, 3'     Finger ext: rubber band 2'     Shrugs 3-5\" x20    Retraction 3-5\" x20    Abduction 10\" x10    Flexion      Internal Rotation 10\" x10    External Rotation 10\" x10          STRENGTHENING-PREs      Flexion      Abduction      Internal Rotation      External Rotation      Shrugs      Biceps      Triceps      Retraction      Extension      Horizontal Abduction in ER      Serratus                        THERABANDS/CABLE COLUMN      Rows Blue, 2 x10    Lats      Extension Blue, 2 x10    Internal Rotation Green, 2 x10    External Rotation Orange, 2 x10    Biceps      Triceps      PNF                                    Manual Intervention (28027)      Scar Massage          Hawkgrips      GH joint mobilizations      Foamroll      Manual PROM: R shoulder ER and flex 8'           NMR re-education (96650)   CUES NEEDED   Plyoback      Therabar oscillations      Body Blade       Rhythmic Stabilization      Ball on the wall                              Therapeutic Activity (51271)      Core training      Swiss ball activities      Education                              Therapeutic Exercise and NMR EXR  [x] (21438) Provided verbal/tactile cueing for activities related to strengthening, flexibility, endurance, ROM  for improvements in scapular, scapulothoracic and UE control with self care, reaching, carrying, lifting, house/yardwork, driving/computer work.     [x] (09488) Provided verbal/tactile cueing for activities related to improving balance, coordination, kinesthetic sense, posture, motor skill, proprioception  to assist with  scapular, scapulothoracic and UE control with self care, reaching, carrying, lifting, house/yardwork, driving/computer work. Therapeutic Activities:    [] (83062 or 22989) Provided verbal/tactile cueing for activities related to improving balance, coordination, kinesthetic sense, posture, motor skill, proprioception and motor activation to allow for proper function of scapular, scapulothoracic and UE control with self care, carrying, lifting, driving/computer work. Home Exercise Program:    [x] (84073) Reviewed/Progressed HEP activities related to strengthening, flexibility, endurance, ROM of scapular, scapulothoracic and UE control with self care, reaching, carrying, lifting, house/yardwork, driving/computer work  [] (23624) Reviewed/Progressed HEP activities related to improving balance, coordination, kinesthetic sense, posture, motor skill, proprioception of scapular, scapulothoracic and UE control with self care, reaching, carrying, lifting, house/yardwork, driving/computer work      Manual Treatments:  PROM / STM / Oscillations-Mobs:  G-I, II, III, IV (PA's, Inf., Post.)  [x] (99497) Provided manual therapy to mobilize soft tissue/joints of cervical/CT, scapular GHJ and UE for the purpose of modulating pain, promoting relaxation,  increasing ROM, reducing/eliminating soft tissue swelling/inflammation/restriction, improving soft tissue extensibility and allowing for proper ROM for normal function with self care, reaching, carrying, lifting, house/yardwork, driving/computer work    Modalities:     [] GAME READY (VASO)- for significant edema, swelling, pain control.   [x] CP to R shoulder x10'    Charges:  Timed Code Treatment Minutes: 64   Total Treatment Minutes: 74      [] EVAL (LOW) 40494 (typically 20 minutes face-to-face)  [] EVAL (MOD) 19397 (typically 30 minutes face-to-face)  [] EVAL (HIGH) 00467 (typically 45 minutes face-to-face)  [] RE-EVAL     [x] FX(61882) x  1 (20') [] IONTO  [x] NMR (91800) x 1 (16')    [] VASO  [x] Manual (88333) x1   (8')  [] Other:  [x] TA x 1  (20')  [] Mech Traction (87817)  [] ES(attended) (38156)      [] ES (un) (11203):         ASSESSMENT: Patient continues to demonstrate visible improvements in R shoulder passive and active ROM in flexion, Er, and IR. She notes that she still has challenges with AROM, despite having greater PROM. PT educated patient that AROM restrictions are likely secondary to decreased strength of the RTC at this point postoperatively, as we have just begun strengthening. Patient demonstrates moderate fatigue with TB and isometric exercises for R shoulder today. Plan to perform reassessment NPV. GOALS:  Patient stated goal: Able to regain ROM and perform all tasks without pain  [x] Progressing: [] Met: [] Not Met: [] Adjusted    Therapist goals for Patient:   Short Term Goals: To be achieved in: 2 weeks  1. Independent in HEP and progression per patient tolerance, in order to prevent re-injury. [] Progressing: [x] Met: [] Not Met: [] Adjusted  2. Patient will have a decrease in pain to facilitate improvement in movement, function, and ADLs as indicated by Functional Deficits. [] Progressing: [x] Met: [] Not Met: [] Adjusted    Long Term Goals: To be achieved in: 16 weeks  1. Disability index score of 15% or less for the UEFI to assist with reaching prior level of function. [x] Progressing: [] Met: [] Not Met: [] Adjusted  2. Patient will demonstrate increased R shoulder flexion, abduction, ER, and IR AROM to >155°, >155°, T2, and T10 respectively to allow for proper joint functioning as indicated by patients Functional Deficits. [x] Progressing: [] Met: [] Not Met: [] Adjusted  3.  Patient will demonstrate an increase in R shoulder strength in all planes  to 4+/5 or greater to allow for proper functional mobility as indicated by patients Functional Deficits. [x] Progressing: [] Met: [] Not Met: [] Adjusted  4. Patient will return to all functional activities without increased symptoms or restriction. [x] Progressing: [] Met: [] Not Met: [] Adjusted  5. Patient will be able to complete all bathing and dressing ADLs without pain or compensation so that she may return to PLOF for all ADLs. [x] Progressing: [] Met: [] Not Met: [] Adjusted        Overall Progression Towards Functional goals/ Treatment Progress Update:  [x] Patient is progressing as expected towards functional goals listed. [] Progression is slowed due to complexities/Impairments listed. [] Progression has been slowed due to co-morbidities. [] Plan just implemented, too soon to assess goals progression <30days   [] Goals require adjustment due to lack of progress  [] Patient is not progressing as expected and requires additional follow up with physician  [] Other    Prognosis for POC: [x] Good [] Fair  [] Poor      Patient requires continued skilled intervention: [x] Yes  [] No    Treatment/Activity Tolerance:  [x] Patient able to complete treatment  [] Patient limited by fatigue  [] Patient limited by pain    [] Patient limited by other medical complications  [] Other:           PLAN: 1-2x/week for 6 weeks; Perform reassessment NPV  [x] Continue per plan of care [] Alter current plan   [] Plan of care initiated [] Hold pending MD visit [] Discharge      Electronically signed by:  Chante Lockhart, PT, DPT, OCS, GOR[facility] Puri PT license: [id number]  [place] PT license: [id number]        Note: If patient does not return for scheduled/ recommended follow up visits, this note will serve as a discharge from care along with most recent update on progress.

## 2020-08-24 ENCOUNTER — TREATMENT (OUTPATIENT)
Dept: PHYSICAL THERAPY | Age: 55
End: 2020-08-24
Payer: COMMERCIAL

## 2020-08-24 PROCEDURE — 97140 MANUAL THERAPY 1/> REGIONS: CPT | Performed by: PHYSICAL THERAPIST

## 2020-08-24 PROCEDURE — 97110 THERAPEUTIC EXERCISES: CPT | Performed by: PHYSICAL THERAPIST

## 2020-08-24 PROCEDURE — 97112 NEUROMUSCULAR REEDUCATION: CPT | Performed by: PHYSICAL THERAPIST

## 2020-08-24 PROCEDURE — 97530 THERAPEUTIC ACTIVITIES: CPT | Performed by: PHYSICAL THERAPIST

## 2020-08-24 NOTE — PROGRESS NOTES
[facility]   Phone: [phone]    Fax: [phone]    Physical Therapy Re-Certification Plan of Care    Dear Dr. Justin Chapman,    We had the pleasure of treating the following patient for physical therapy services at [address] [place] [address]. A summary of our findings can be found in the updated assessment below. This includes our plan of care. If you have any questions or concerns regarding these findings, please do not hesitate to contact me at the office phone number checked above.   Thank you for the referral.     Physician Signature:________________________________Date:__________________  By signing above (or electronic signature), therapists plan is approved by physician      Overall Response to Treatment:   [x]Patient is responding well to treatment and improvement is noted with regards  to goals   []Patient should continue to improve in reasonable time if they continue HEP   []Patient has plateaued and is no longer responding to skilled PT intervention    []Patient is getting worse and would benefit from return to referring MD   []Patient unable to adhere to initial POC   []Other:           Physical Therapy Treatment Note/ Progress Report:           Date:  2020    Patient Name:  Shira Mariano    :  1965  MRN: <[id number]>  Restrictions/Precautions:    Medical/Treatment Diagnosis Information:  · Diagnosis: R shoulder OA s/p TSA  · DOS:   Insurance/Certification information:  PT Insurance Information: BCBS  Physician Information:  Referring Practitioner: Dr. Justin Chapman  Has the plan of care been signed (Y/N):        []  Yes  [x]  No      Date of Patient follow up with Physician: 2020      Is this a Progress Report:     [x]  Yes  []  No        If Yes:  Date Range for reporting period:  Beginnin2020  Update NPV  Endin2020    Progress report will be due (10 Rx or 30 days whichever is less): 3/70/1627      Recertification will be due (POC Duration  / 90 days whichever is less): 9/24/2020        Visit # Insurance Allowable Auth Required   16 90 (hard max) []  Yes [x]  No        Functional Scale: UEFI: 54/80, 32.5% limitaiton   Date assessed: 8/24/2020     Latex Allergy:  [x]NO      []YES  Preferred Language for Healthcare:   [x]English       []other:      Pain level:  0-2/10     SUBJECTIVE:  Patient reports that R shoulder continues to improve. She notes no flare ups after being out on the boat last week.  She notes that she feels she is continuing to gain ROM and function back in R UE.    13+ weeks postop    OBJECTIVE: See eval       ROM PROM AROM  Comment:  8/24/2020    L R L R    Flexion  160° (pulley) 160° 150°    Abduction   168° 133°    ER   T3 C7    IR   T9 T12    Elbow Flex        Elbow Ext              Strength L R Comment:  8/24/2020   Flexion  3+/5    Abduction  3+/5    ER  3+/5    IR  4-/5            Special Tests Results/Comment: deferred secondary to surgery:  8/24/2020        RESTRICTIONS/PRECAUTIONS: R TSA    Exercises/Interventions:     Therapeutic Ex (17049) Sets/sec Reps Notes/CUES   AD UE BIKE            STRETCHING/ROM      Pulleys 10\" x10 Flex and scap   Table Slides 10\" x10   Wand: seated 10\" x10   Supine wand ER 10\" x10   Supine wand flexion 10\" x10    UE [place] 10\" x10 Flex and scap   Pendulum  x30 F/b, s/s   Table walk back 10\" x10    Doorway 10\" x10    Wall Slides 10\" x10    CBA      BB adduction 10\" x10    BBIR 10\" x5    Sleeper       Elbow AROM 5# x20    Wrist 4 way 5# x20    Upper trap stretch 10\" x10    Levator stretch 10\" x10          ISOMETRICS      Gripping blue, 3'     Finger ext: rubber band 2'     Shrugs 3-5\" x20    Retraction 3-5\" x20    Abduction 10\" x10    Flexion      Internal Rotation 10\" x10    External Rotation 10\" x10          STRENGTHENING-PREs      Flexion      Abduction      Internal Rotation      External Rotation      Shrugs      Biceps      Triceps Retraction      Extension      Horizontal Abduction in ER      Serratus                        THERABANDS/CABLE COLUMN      Rows Blue, 2 x10    Lats      Extension Blue, 2 x10    Internal Rotation Green, 2 x10    External Rotation Orange, 2 x10    Biceps      Triceps      PNF                                    Manual Intervention (67767)      Scar Massage          Hawkgrips      GH joint mobilizations      Foamroll      Manual PROM: R shoulder ER and flex 8'           NMR re-education (90088)   CUES NEEDED   Plyoback      Therabar oscillations      Body Blade       Rhythmic Stabilization      Ball on the wall                              Therapeutic Activity (02629)      Core training      Swiss ball activities      Education                              Therapeutic Exercise and NMR EXR  [x] (98964) Provided verbal/tactile cueing for activities related to strengthening, flexibility, endurance, ROM  for improvements in scapular, scapulothoracic and UE control with self care, reaching, carrying, lifting, house/yardwork, driving/computer work. [x] (71262) Provided verbal/tactile cueing for activities related to improving balance, coordination, kinesthetic sense, posture, motor skill, proprioception  to assist with  scapular, scapulothoracic and UE control with self care, reaching, carrying, lifting, house/yardwork, driving/computer work. Therapeutic Activities:    [x] (46125 or 22073) Provided verbal/tactile cueing for activities related to improving balance, coordination, kinesthetic sense, posture, motor skill, proprioception and motor activation to allow for proper function of scapular, scapulothoracic and UE control with self care, carrying, lifting, driving/computer work.      Home Exercise Program:    [x] (21396) Reviewed/Progressed HEP activities related to strengthening, flexibility, endurance, ROM of scapular, scapulothoracic and UE control with self care, reaching, carrying, lifting, house/yardwork, driving/computer work  [] (02223) Reviewed/Progressed HEP activities related to improving balance, coordination, kinesthetic sense, posture, motor skill, proprioception of scapular, scapulothoracic and UE control with self care, reaching, carrying, lifting, house/yardwork, driving/computer work      Manual Treatments:  PROM / STM / Oscillations-Mobs:  G-I, II, III, IV (PA's, Inf., Post.)  [x] (27326) Provided manual therapy to mobilize soft tissue/joints of cervical/CT, scapular GHJ and UE for the purpose of modulating pain, promoting relaxation,  increasing ROM, reducing/eliminating soft tissue swelling/inflammation/restriction, improving soft tissue extensibility and allowing for proper ROM for normal function with self care, reaching, carrying, lifting, house/yardwork, driving/computer work    Modalities:     [] GAME READY (VASO)- for significant edema, swelling, pain control. [x] CP to R shoulder x10'    Charges:  Timed Code Treatment Minutes: 60   Total Treatment Minutes: 70      [] EVAL (LOW) 56298 (typically 20 minutes face-to-face)  [] EVAL (MOD) 30093 (typically 30 minutes face-to-face)  [] EVAL (HIGH) 15280 (typically 45 minutes face-to-face)  [] RE-EVAL     [x] HG(56508) x  1 (18') [] IONTO  [x] NMR (64935) x 1 (14')    [] VASO  [x] Manual (56506) x1   (8')  [] Other:  [x] TA x 1  (20')  [] [facility] T[facility] (42885)  [] ES(attended) (45876)      [] ES (un) (04589):         ASSESSMENT: Patient demonstrates good progress toward all LTGs previously set by PT within limitations of postop protocol. She demonstrates large gains in AROM of R shoulder flexion and abduction, see measurements above. Greatest limitations in motion remain in ER and IR of the R shoulder. Strength was formally assessed on R today, with ability to tolerate light resistance in all planes and minimal soreness noted. Patient continues to have limitations with ADLs such as reaching into [place] cabinets and lifting items around the home.  Patient would benefit from continued skilled PT services to address remaining deficits in R shoulder AROM and strength in all planes so that she may return to PLOF for all ADLs. GOALS:  Patient stated goal: Able to regain ROM and perform all tasks without pain  [x] Progressing: [] Met: [] Not Met: [] Adjusted    Therapist goals for Patient:   Short Term Goals: To be achieved in: 2 weeks  1. Independent in HEP and progression per patient tolerance, in order to prevent re-injury. [] Progressing: [x] Met: [] Not Met: [] Adjusted  2. Patient will have a decrease in pain to facilitate improvement in movement, function, and ADLs as indicated by Functional Deficits. [] Progressing: [x] Met: [] Not Met: [] Adjusted    Long Term Goals: To be achieved in: 16 weeks  1. Disability index score of 15% or less for the UEFI to assist with reaching prior level of function. [x] Progressing: [] Met: [] Not Met: [] Adjusted  2. Patient will demonstrate increased R shoulder flexion, abduction, ER, and IR AROM to >155°, >155°, T2, and T10 respectively to allow for proper joint functioning as indicated by patients Functional Deficits. [x] Progressing: [] Met: [] Not Met: [] Adjusted  3. Patient will demonstrate an increase in R shoulder strength in all planes  to 4+/5 or greater to allow for proper functional mobility as indicated by patients Functional Deficits. [x] Progressing: [] Met: [] Not Met: [] Adjusted  4. Patient will return to all functional activities without increased symptoms or restriction. [x] Progressing: [] Met: [] Not Met: [] Adjusted  5. Patient will be able to complete all bathing and dressing ADLs without pain or compensation so that she may return to PLOF for all ADLs. [] Progressing: [x] Met: [] Not Met: [] Adjusted        Overall Progression Towards Functional goals/ Treatment Progress Update:  [x] Patient is progressing as expected towards functional goals listed.     [] Progression is slowed due to complexities/Impairments listed. [] Progression has been slowed due to co-morbidities. [] Plan just implemented, too soon to assess goals progression <30days   [] Goals require adjustment due to lack of progress  [] Patient is not progressing as expected and requires additional follow up with physician  [] Other    Prognosis for POC: [x] Good [] Fair  [] Poor      Patient requires continued skilled intervention: [x] Yes  [] No    Treatment/Activity Tolerance:  [x] Patient able to complete treatment  [] Patient limited by fatigue  [] Patient limited by pain    [] Patient limited by other medical complications  [] Other:           PLAN: 1-2x/week for 6 weeks;   [x] Continue per plan of care [] Alter current plan   [] Plan of care initiated [] Hold pending MD visit [] Discharge      Electronically signed by:  Evan Melo, PT, DPT, OCS, OMT-C        [place] PT license: [id number]  [place] PT license: [id number]        Note: If patient does not return for scheduled/ recommended follow up visits, this note will serve as a discharge from care along with most recent update on progress.

## 2020-08-27 ENCOUNTER — OFFICE VISIT (OUTPATIENT)
Dept: ORTHOPEDIC SURGERY | Age: 55
End: 2020-08-27
Payer: COMMERCIAL

## 2020-08-27 ENCOUNTER — TREATMENT (OUTPATIENT)
Dept: PHYSICAL THERAPY | Age: 55
End: 2020-08-27
Payer: COMMERCIAL

## 2020-08-27 VITALS — WEIGHT: 179 LBS | HEIGHT: 67 IN | BODY MASS INDEX: 28.09 KG/M2

## 2020-08-27 PROCEDURE — 99213 OFFICE O/P EST LOW 20 MIN: CPT | Performed by: ORTHOPAEDIC SURGERY

## 2020-08-27 PROCEDURE — 97112 NEUROMUSCULAR REEDUCATION: CPT | Performed by: PHYSICAL THERAPIST

## 2020-08-27 PROCEDURE — 97530 THERAPEUTIC ACTIVITIES: CPT | Performed by: PHYSICAL THERAPIST

## 2020-08-27 PROCEDURE — 97140 MANUAL THERAPY 1/> REGIONS: CPT | Performed by: PHYSICAL THERAPIST

## 2020-08-27 PROCEDURE — 97110 THERAPEUTIC EXERCISES: CPT | Performed by: PHYSICAL THERAPIST

## 2020-08-27 NOTE — LETTER
Physical Therapy Rehabilitation Referral    Patient Name:  Chioma Peterson      YOB: 1965    Diagnosis:    1. Status post total replacement of right shoulder        Precautions:     [x] Evaluate and Treat    Post Op Instructions:  [] Continuous passive motion (CPM) [] Elbow ROM  [x] Exercise in plane of scapula  []  Strengthening     [] Pulley and instruction   [x] Home exercise program (copy to patient)   [] Sling when arm at risk  [] Sling or brace at all times   [x] AAROM: Forward elevation to  140            [x] AAROM: External rotation  To  40    [x] Isometric external rotator strengthening [x] AAROM: internal rotation: up the back  [x] Isometric abductor strengthening  [x] AAROM: Internal abduction   [x] Isometric internal rotator strengthening [x] AAROM: cross-body adduction             Stretching:     Strengthening:  [x] Four quadrant (FE, ER, IR, CBA)  [x] Rotator cuff (ER, IR, Abd)  [x] Forward Elevation    [] External Rotators     [x] External Rotation    [] Internal Rotators  [x] Internal Rotation: up/back   [] Abductors     [x] Internal Rotation: supine in abduction  [x] Sleeper Stretch    [] Flexors  [x] Cross-body abduction    [] Extensors  [x] Pendulum (FE, Abd/Add, cw/ccw)  [x] Scapular Stabilizers   [] Wall-walking (FE, Abd)        [x] Shoulder shrugs     [] Table slides (FE)                [x] Rhomboid pinch  [] Elbow (flex, ext, pron, sup)        [] Lat.  Pull downs     [] Medial epicondylitis program       [] Forward punch   [] Lateral epicondylitis program       [] Internal rotators     [x] Progressive resistive exercises  [] Bench Press        [] Bench press plus  Activities:     [] Lateral pull-downs  [] Rowing     [] Progressive two-hand supine press  [] Stepper/Exercise bike   [x] Biceps: curls/supination  [] Swimming  [] Water exercises    Modalities:     Return to Sport:  [x] Of Choice      [] Plyometrics  [] Ultrasound     [] Rhythmic stabilization [] Iontophoresis    [] Core strengthening   [] Moist heat     [] Sports specific program:   [] Massage         [x] Cryotherapy      [] Electrical stimulation     [] Paraffin  [] Whirlpool  [] TENS    [x] Home exercise program (copy to patient). Perform exercises for:   15     minutes    3      times/day  [x] Supervised physical therapy  Frequency: []  1x week  [x] 2x week  [] 3x week  [] Other:   Duration: [] 2 weeks   [] 4 weeks  [x] 6 weeks  [] Other:     Additional Instructions:     Jose F Grove MD, PhD

## 2020-08-27 NOTE — PROGRESS NOTES
Joe Galvan [facility]   Phone: [phone]    Fax: [phone]        Physical Therapy Treatment Note/ Progress Report:           Date:  2020    Patient Name:  Judge Teresa    :  1965  MRN: <[id number]>  Restrictions/Precautions:    Medical/Treatment Diagnosis Information:  · Diagnosis: R shoulder OA s/p TSA  · DOS:   Insurance/Certification information:  PT Insurance Information: BCBS  Physician Information:  Referring Practitioner: Dr. Uziel Zuniga  Has the plan of care been signed (Y/N):        [x]  Yes  []  No      Date of Patient follow up with Physician: 2020      Is this a Progress Report:     []  Yes  [x]  No        If Yes:  Date Range for reporting period:  Beginnin2020   Endin2020    Progress report will be due (10 Rx or 30 days whichever is less):       Recertification will be due (POC Duration  / 90 days whichever is less): 2020        Visit # Insurance Allowable Auth Required   17 90 (hard max) []  Yes [x]  No        Functional Scale: UEFI: 54/80, 32.5% limitaiton   Date assessed: 2020     Latex Allergy:  [x]NO      []YES  Preferred Language for Healthcare:   [x]English       []other:      Pain level:  0-1/10     SUBJECTIVE: Patient reports minimal to no pain at all in R shoulder with tightness during internal rotation. She states she sees some improvement in ROM.       13+ weeks postop    OBJECTIVE: See eval       ROM PROM AROM  Comment:  2020    L R L R    Flexion  160° (pulley) 160° 150°    Abduction   168° 133°    ER   T3 C7    IR   T9 T12    Elbow Flex        Elbow Ext              Strength L R Comment:  2020   Flexion  3+/5    Abduction  3+/5    ER  3+/5    IR  4-/5            Special Tests Results/Comment: deferred secondary to surgery:  2020        RESTRICTIONS/PRECAUTIONS: R TSA    Exercises/Interventions:     Therapeutic Ex (16286) Sets/sec Reps Notes/CUES   AD UE BIKE STRETCHING/ROM      Pulleys 10\" x10 Flex and scap   Table Slides 10\" x10   Wand: seated 10\" x10   Supine wand ER 10\" x10   Supine wand flexion 10\" x10    UE [place] 10\" x10 Flex and scap   Pendulum  x30 F/b, s/s   Table walk back 10\" x10    Doorway 10\" x10    Wall Slides 10\" x10    CBA      BB adduction 10\" x10    BBIR 10\" x5    Sleeper       Elbow AROM 5# x20    Wrist 4 way 5# x20    Upper trap stretch 10\" x10    Levator stretch 10\" x10          ISOMETRICS      Gripping blue, 3'     Finger ext: rubber band 2'     Shrugs 3-5\" x20    Retraction 3-5\" x20    Abduction 10\" x10    Flexion      Internal Rotation 10\" x10    External Rotation 10\" x10          STRENGTHENING-PREs      Flexion      Abduction      Internal Rotation      External Rotation      Shrugs      Biceps      Triceps      Retraction      Extension      Horizontal Abduction in ER      Serratus                        THERABANDS/CABLE COLUMN      Rows Blue, 2 x10    Lats      Extension Blue, 2 x10    Internal Rotation Green, 2 x10    External Rotation Orange, 2 x10    Biceps      Triceps      PNF                                    Manual Intervention (10403)      Scar Massage          Hawkgrips      GH joint mobilizations      Foamroll      Manual PROM: R shoulder ER and flex 8'           NMR re-education (61867)   CUES NEEDED   Plyoback      Therabar oscillations      Body Blade       Rhythmic Stabilization      Ball on the wall                              Therapeutic Activity (14497)      Core training      Swiss ball activities      Education                              Therapeutic Exercise and NMR EXR  [x] (70469) Provided verbal/tactile cueing for activities related to strengthening, flexibility, endurance, ROM  for improvements in scapular, scapulothoracic and UE control with self care, reaching, carrying, lifting, house/yardwork, driving/computer work.     [x] (54063) Provided verbal/tactile cueing for activities related to improving balance, coordination, kinesthetic sense, posture, motor skill, proprioception  to assist with  scapular, scapulothoracic and UE control with self care, reaching, carrying, lifting, house/yardwork, driving/computer work. Therapeutic Activities:    [x] (79102 or 47735) Provided verbal/tactile cueing for activities related to improving balance, coordination, kinesthetic sense, posture, motor skill, proprioception and motor activation to allow for proper function of scapular, scapulothoracic and UE control with self care, carrying, lifting, driving/computer work. Home Exercise Program:    [x] (97837) Reviewed/Progressed HEP activities related to strengthening, flexibility, endurance, ROM of scapular, scapulothoracic and UE control with self care, reaching, carrying, lifting, house/yardwork, driving/computer work  [] (29116) Reviewed/Progressed HEP activities related to improving balance, coordination, kinesthetic sense, posture, motor skill, proprioception of scapular, scapulothoracic and UE control with self care, reaching, carrying, lifting, house/yardwork, driving/computer work      Manual Treatments:  PROM / STM / Oscillations-Mobs:  G-I, II, III, IV (PA's, Inf., Post.)  [x] (39777) Provided manual therapy to mobilize soft tissue/joints of cervical/CT, scapular GHJ and UE for the purpose of modulating pain, promoting relaxation,  increasing ROM, reducing/eliminating soft tissue swelling/inflammation/restriction, improving soft tissue extensibility and allowing for proper ROM for normal function with self care, reaching, carrying, lifting, house/yardwork, driving/computer work    Modalities:     [] GAME READY (VASO)- for significant edema, swelling, pain control.   [x] CP to R shoulder x10'    Charges:  Timed Code Treatment Minutes: 60   Total Treatment Minutes: 70      [] EVAL (LOW) 38898 (typically 20 minutes face-to-face)  [] EVAL (MOD) 58583 (typically 30 minutes face-to-face)  [] EVAL (HIGH) 423 3735 (typically 45 minutes face-to-face)  [] RE-EVAL     [x] KP(16274) x  1 (18') [] IONTO  [x] NMR (27285) x 1 (14')    [] VASO  [x] Manual (33392) x1   (8')  [] Other:  [x] TA x 1  (20')  [] Mech Traction (63003)  [] ES(attended) (02100)      [] ES (un) (47132):         ASSESSMENT: Patient demonstrates independence throughout most exercises and good carryover from HEP. She required minimal verbal cuing to achieve proper form in ranger ROM activity. She demonstrates good progress toward all LTGs previously set by PT. Patient continues to progress in R shoulder ROM and strengthening, IR and ER ROM noted today. Tightness was noted today by PT during R shoulder IR manual stretch. Plan to introduce increase in TB strengthening exercises during NPV. GOALS:  Patient stated goal: Able to regain ROM and perform all tasks without pain  [x] Progressing: [] Met: [] Not Met: [] Adjusted    Therapist goals for Patient:   Short Term Goals: To be achieved in: 2 weeks  1. Independent in HEP and progression per patient tolerance, in order to prevent re-injury. [] Progressing: [x] Met: [] Not Met: [] Adjusted  2. Patient will have a decrease in pain to facilitate improvement in movement, function, and ADLs as indicated by Functional Deficits. [] Progressing: [x] Met: [] Not Met: [] Adjusted    Long Term Goals: To be achieved in: 16 weeks  1. Disability index score of 15% or less for the UEFI to assist with reaching prior level of function. [x] Progressing: [] Met: [] Not Met: [] Adjusted  2. Patient will demonstrate increased R shoulder flexion, abduction, ER, and IR AROM to >155°, >155°, T2, and T10 respectively to allow for proper joint functioning as indicated by patients Functional Deficits. [x] Progressing: [] Met: [] Not Met: [] Adjusted  3. Patient will demonstrate an increase in R shoulder strength in all planes  to 4+/5 or greater to allow for proper functional mobility as indicated by patients Functional Deficits.    [x] Progressing: [] Met: [] Not Met: [] Adjusted  4. Patient will return to all functional activities without increased symptoms or restriction. [x] Progressing: [] Met: [] Not Met: [] Adjusted  5. Patient will be able to complete all bathing and dressing ADLs without pain or compensation so that she may return to PLOF for all ADLs. [] Progressing: [x] Met: [] Not Met: [] Adjusted        Overall Progression Towards Functional goals/ Treatment Progress Update:  [x] Patient is progressing as expected towards functional goals listed. [] Progression is slowed due to complexities/Impairments listed. [] Progression has been slowed due to co-morbidities. [] Plan just implemented, too soon to assess goals progression <30days   [] Goals require adjustment due to lack of progress  [] Patient is not progressing as expected and requires additional follow up with physician  [] Other    Prognosis for POC: [x] Good [] Fair  [] Poor      Patient requires continued skilled intervention: [x] Yes  [] No    Treatment/Activity Tolerance:  [x] Patient able to complete treatment  [] Patient limited by fatigue  [] Patient limited by pain    [] Patient limited by other medical complications  [] Other:           PLAN: 1-2x/week for 6 weeks;   [x] Continue per plan of care [] Alter current plan   [] Plan of care initiated [] Hold pending MD visit [] Discharge      Electronically signed by:   Sergey Mendes, Student Physical Therapist      Therapist was present, directed the patient's care, made skilled judgement, and was responsible for assessment and treatment of the patient. Moon Lanier, PT, DPT, OCS, OMT-C        [address] [address] [address] PT license: [id number]  [place] PT license: [id number]        Note: If patient does not return for scheduled/ recommended follow up visits, this note will serve as a discharge from care along with most recent update on progress.

## 2020-08-27 NOTE — PROGRESS NOTES
[facility]  History and Physical  Shoulder Pain    Date:  2020    Name:  Maddy Arteaga  Address:  [place] [address] [place] [id number]    :  1965      Age:   62 y.o.    SSN:  [id number]      Medical Record Number:  <[id number]>    Reason for Visit:    Follow-up (Right Shoulder)      HPI:   Maddy Arteaga is a 62 y.o. female who presents to our office today for follow up of the right shoulder pain. Patient underwent a right anatomic shoulder arthroplasty on 2020. She has been going to physical therapy at the [facility] office and working with Joselyn Lundberg. She reports she has been working hard with her right shoulder therapy and has been going twice a week now instead of once a week. She feels that she is doing better and is happy with her progress. She would like to start playing golf. Pain Assessment  Location of Pain: Shoulder  Location Modifiers: Right  Severity of Pain: 0  Quality of Pain: (N/A)  Frequency of Pain: (N/a)  Aggravating Factors: (None)  Limiting Behavior: Some  Work-Related Injury: No  Are there other pain locations you wish to document?: No      Review of Systems:  A 14 point review of systems available in the scanned medical record as documented by the patient on 2020. The review is negative with the exception of those things mentioned in the History of Present Illness and Past Medical History. Past Medical History:  Patient's medications, allergies, past medical, surgical, social and family histories were reviewed and updated as appropriate. Allergies:  No Known Allergies    Physical Exam:  Vitals:     General: Maddy Arteaga is a healthy and well appearing 62 y.o. female who is sitting comfortably in our office in acute distress. Skin:  There are no skin lesions, cellulitis, or extreme edema. The patient has warm and well-perfused Bilateral upper extremities with brisk capillary refill. Eyes: Extra-ocular muscles intact  Mouth: Oral mucosa moist. No perioral lesions  Pulm: Respirations unlabored and regular. Neuro: Alert and oriented x3    right Shoulder Exam:  Inspection: well healed incision. No gross deformities, no signs of infection. Palpation:  No crepitus    Active Range of Motion: Forward elevation of 140, abduction of 130, external rotation with elbow at the side 40, internal rotation to the back is L2    Passive Range of Motion: Passively forward elevation can be further increased to 150. Strength: External rotation with resistance with elbow at the side 4/5, internal rotation with resistance with elbow at the side +4/5    Special Tests:   No Caden muscle deformity. Neurovascular: Sensation to light touch is intact, no motor deficits, palpable radial pulses 2+    Additional Examinations:    Examination of the contralateral extremity does not show any tenderness, deformity or injury. Range of motion is unremarkable. There is no gross instability. There are no rashes, ulcerations or lesions. Strength and tone are normal.      Radiographic:   xray not obtained. Assessment:  Willie Montano is a 62 y.o. female underwent a right anatomic shoulder arthroplasty on 5/19/2020. She can certainly ramp up with her activities including a transition back to playing golf. Impression:  Encounter Diagnosis   Name Primary?  Status post total replacement of right shoulder Yes       Office Procedures:  Orders Placed This Encounter   Procedures    [facility] PT [facility] Physical Therapy     Referral Priority:   Routine     Referral Type:   Eval and Treat     Referral Reason:   Specialty Services Required     Requested Specialty:   Physical Therapy     Number of Visits Requested:   1       Plan:   She has been going to physical therapy at the [facility] office and working with Yecenia Bonilla. She has finally made a turn in her recovery.   We are no longer worried of her postoperative stiffness that she initially presented with. We feel that her motion has gotten much better and over the last month. We recommend that she continue to go twice a week over the next couple of weeks and than down to once a week and eventually transition to a home-based program when she and the physical therapist agree on. Updated therapy orders will be placed in the chart. Also reviewed the golf program with the patient today. We will see her back in 6 weeks for a follow-up visit. She was agreeable to the above plan. All her questions were fully answered today. 8/27/2020  11:47 AM      Best Delgado PA-C  Orthopaedic Sports Medicine Physician Assistant    During this examination, IBest PA-C, functioned as a scribe for Dr. Janeth Bernheim. This dictation was performed with a verbal recognition program (DRAGON) and it was checked for errors. It is possible that there are still dictated errors within this office note. If so, please bring any errors to my attention for an addendum. All efforts were made to ensure that this office note is accurate.  _________________  I, Dr. Janeth Bernheim, personally performed the services described in this documentation as described by Best Delgado PA-C in my presence, and it is both accurate and complete. Jose F Red MD, PhD  8/27/2020

## 2020-08-31 ENCOUNTER — TREATMENT (OUTPATIENT)
Dept: PHYSICAL THERAPY | Age: 55
End: 2020-08-31
Payer: COMMERCIAL

## 2020-08-31 PROCEDURE — 97140 MANUAL THERAPY 1/> REGIONS: CPT | Performed by: PHYSICAL THERAPIST

## 2020-08-31 PROCEDURE — 97112 NEUROMUSCULAR REEDUCATION: CPT | Performed by: PHYSICAL THERAPIST

## 2020-08-31 PROCEDURE — 97530 THERAPEUTIC ACTIVITIES: CPT | Performed by: PHYSICAL THERAPIST

## 2020-08-31 PROCEDURE — 97110 THERAPEUTIC EXERCISES: CPT | Performed by: PHYSICAL THERAPIST

## 2020-08-31 NOTE — PROGRESS NOTES
RESTRICTIONS/PRECAUTIONS: R TSA    Exercises/Interventions:     Therapeutic Ex (51883) Sets/sec Reps Notes/CUES   AD UE BIKE            STRETCHING/ROM      Pulleys 10\" x10 Flex and scap   Table Slides 10\" x10   Wand: seated 10\" x10   Supine wand ER 10\" x10   Supine wand flexion 10\" x10    UE [place] 10\" x10 Flex and scap   Pendulum  x30 F/b, s/s   Table walk back 10\" x10    Doorway 10\" x10    Wall Slides 10\" x10    CBA      BB adduction 10\" x10    BBIR 10\" x5    Sleeper       Elbow AROM 5# x20    Wrist 4 way 5# x20    Upper trap stretch 10\" x10    Levator stretch 10\" x10          ISOMETRICS      Gripping     Finger ext: rubber band     Shrugs    Retraction    Abduction 10\" x10    Flexion      Internal Rotation 10\" x10    External Rotation 10\" x10          STRENGTHENING-PREs      Flexion      Abduction      Internal Rotation      External Rotation      Shrugs      Biceps      Triceps      Retraction      Extension      Horizontal Abduction in ER      Serratus                        THERABANDS/CABLE COLUMN      Rows Blue, 2 x10 Increase NPV   Lats      Extension Blue, 2 x10 Increase NPV   Internal Rotation Green, 2 x10    External Rotation Green, 2 x10    Biceps      Triceps      PNF                                    Manual Intervention (74859)      Scar Massage          Hawkgrips      GH joint mobilizations      Foamroll      Manual PROM: R shoulder ER and flex 8'           NMR re-education (19549)   CUES NEEDED   Plyoback      Therabar oscillations      Body Blade       Rhythmic Stabilization      Ball on the wall                              Therapeutic Activity (32575)      Core training      Swiss ball activities      Education                              Therapeutic Exercise and NMR EXR  [x] (87388) Provided verbal/tactile cueing for activities related to strengthening, flexibility, endurance, ROM  for improvements in scapular, scapulothoracic and UE control with self care, reaching, carrying, lifting, house/yardwork, driving/computer work. [x] (12554) Provided verbal/tactile cueing for activities related to improving balance, coordination, kinesthetic sense, posture, motor skill, proprioception  to assist with  scapular, scapulothoracic and UE control with self care, reaching, carrying, lifting, house/yardwork, driving/computer work. Therapeutic Activities:    [x] (93144 or 05355) Provided verbal/tactile cueing for activities related to improving balance, coordination, kinesthetic sense, posture, motor skill, proprioception and motor activation to allow for proper function of scapular, scapulothoracic and UE control with self care, carrying, lifting, driving/computer work. Home Exercise Program:    [x] (52067) Reviewed/Progressed HEP activities related to strengthening, flexibility, endurance, ROM of scapular, scapulothoracic and UE control with self care, reaching, carrying, lifting, house/yardwork, driving/computer work  [] (02185) Reviewed/Progressed HEP activities related to improving balance, coordination, kinesthetic sense, posture, motor skill, proprioception of scapular, scapulothoracic and UE control with self care, reaching, carrying, lifting, house/yardwork, driving/computer work      Manual Treatments:  PROM / STM / Oscillations-Mobs:  G-I, II, III, IV (PA's, Inf., Post.)  [x] (26515) Provided manual therapy to mobilize soft tissue/joints of cervical/CT, scapular GHJ and UE for the purpose of modulating pain, promoting relaxation,  increasing ROM, reducing/eliminating soft tissue swelling/inflammation/restriction, improving soft tissue extensibility and allowing for proper ROM for normal function with self care, reaching, carrying, lifting, house/yardwork, driving/computer work    Modalities:     [] GAME READY (VASO)- for significant edema, swelling, pain control.   [x] CP to R shoulder x10'    Charges:  Timed Code Treatment Minutes: 54   Total Treatment Minutes: 64       [] EVAL (LOW) 05232 Progressing: [] Met: [] Not Met: [] Adjusted  3. Patient will demonstrate an increase in R shoulder strength in all planes  to 4+/5 or greater to allow for proper functional mobility as indicated by patients Functional Deficits. [x] Progressing: [] Met: [] Not Met: [] Adjusted  4. Patient will return to all functional activities without increased symptoms or restriction. [x] Progressing: [] Met: [] Not Met: [] Adjusted  5. Patient will be able to complete all bathing and dressing ADLs without pain or compensation so that she may return to PLOF for all ADLs. [] Progressing: [x] Met: [] Not Met: [] Adjusted        Overall Progression Towards Functional goals/ Treatment Progress Update:  [x] Patient is progressing as expected towards functional goals listed. [] Progression is slowed due to complexities/Impairments listed. [] Progression has been slowed due to co-morbidities. [] Plan just implemented, too soon to assess goals progression <30days   [] Goals require adjustment due to lack of progress  [] Patient is not progressing as expected and requires additional follow up with physician  [] Other    Prognosis for POC: [x] Good [] Fair  [] Poor      Patient requires continued skilled intervention: [x] Yes  [] No    Treatment/Activity Tolerance:  [x] Patient able to complete treatment  [] Patient limited by fatigue  [] Patient limited by pain    [] Patient limited by other medical complications  [] Other:           PLAN: 1-2x/week for 6 weeks;   [x] Continue per plan of care [] Alter current plan   [] Plan of care initiated [] Hold pending MD visit [] Discharge      Electronically signed by:   Brady Parisi, Student Physical Therapist      Therapist was present, directed the patient's care, made skilled judgement, and was responsible for assessment and treatment of the patient.          Rukhsana Knowles, PT, DPT, OCS, OMT-C        [place] PT license: [id number]  [place] PT license: [id number]        Note: If patient does not return for scheduled/ recommended follow up visits, this note will serve as a discharge from care along with most recent update on progress.

## 2020-09-03 ENCOUNTER — TREATMENT (OUTPATIENT)
Dept: PHYSICAL THERAPY | Age: 55
End: 2020-09-03
Payer: COMMERCIAL

## 2020-09-03 PROCEDURE — 97530 THERAPEUTIC ACTIVITIES: CPT | Performed by: PHYSICAL THERAPIST

## 2020-09-03 PROCEDURE — 97110 THERAPEUTIC EXERCISES: CPT | Performed by: PHYSICAL THERAPIST

## 2020-09-03 PROCEDURE — 97140 MANUAL THERAPY 1/> REGIONS: CPT | Performed by: PHYSICAL THERAPIST

## 2020-09-03 PROCEDURE — 97112 NEUROMUSCULAR REEDUCATION: CPT | Performed by: PHYSICAL THERAPIST

## 2020-09-03 NOTE — PROGRESS NOTES
[facility]   Phone: [phone]    Fax: [phone]        Physical Therapy Treatment Note/ Progress Report:           Date:  9/3/2020    Patient Name:  Marilou Saez    :  1965  MRN: <[id number]>  Restrictions/Precautions:    Medical/Treatment Diagnosis Information:  · Diagnosis: R shoulder OA s/p TSA  · DOS:   Insurance/Certification information:  PT Insurance Information: [facility]  Physician Information:  Referring Practitioner: Dr. Nara Odell  Has the plan of care been signed (Y/N):        [x]  Yes  []  No      Date of Patient follow up with Physician: 2020      Is this a Progress Report:     []  Yes  [x]  No        If Yes:  Date Range for reporting period:  Beginnin2020   Endin2020    Progress report will be due (10 Rx or 30 days whichever is less): 4542      Recertification will be due (POC Duration  / 90 days whichever is less): 2020        Visit # Insurance Allowable Auth Required   19 90 (hard max) []  Yes [x]  No        Functional Scale: UEFI: 54/80, 32.5% limitaiton   Date assessed: 2020     Latex Allergy:  [x]NO      []YES  Preferred Language for Healthcare:   [x]English       []other:      Pain level:  0-1/10     SUBJECTIVE: Patient reports minimal pain in R shoulder. She states that she thinks the weather may have caused her R shoulder to stiffen up. Patient reports she is progressing with her return to play program for golf and golfed on Tuesday which caused soreness \"everywhere. \"    15+ weeks postop    OBJECTIVE: See eval       ROM PROM AROM  Comment:  2020    L R L R    Flexion  160° (pulley) 160° 150°    Abduction   168° 133°    ER   T3 C7    IR   T9 T12    Elbow Flex        Elbow Ext              Strength L R Comment:  2020   Flexion  3+/5    Abduction  3+/5    ER  3+/5    IR  4-/5            Special Tests Results/Comment: deferred secondary to surgery:  2020        RESTRICTIONS/PRECAUTIONS: R TSA    Exercises/Interventions:     Therapeutic Ex (06348) Sets/sec Reps Notes/CUES   AD UE BIKE            STRETCHING/ROM      Pulleys 10\" x10 Flex and scap   Table Slides 10\" x10   Wand: seated 10\" x10   Supine wand ER 10\" x10   Supine wand flexion 10\" x10    UE [place] 10\" x10 Flex and scap   Pendulum  x30 F/b, s/s   Table walk back 10\" x10    Doorway 10\" x10    Wall Slides 10\" x10    CBA Add NPV     BB adduction 10\" x10    BBIR 10\" x5    Sleeper       Elbow AROM 5# x20    Wrist 4 way 5# x20    Upper trap stretch 10\" x10    Levator stretch 10\" x10    Open Books Add NPV           ISOMETRICS      Gripping     Finger ext: rubber band     Shrugs    Retraction    Abduction 10\" x10    Flexion      Internal Rotation 10\" x10    External Rotation 10\" x10          STRENGTHENING-PREs      Flexion      Abduction      Internal Rotation      External Rotation      Shrugs      Biceps      Triceps      Retraction      Extension      Horizontal Abduction in ER      Serratus                        THERABANDS/CABLE COLUMN      Rows Black, 2 x10    Lats      Extension Black, 2 x10    Internal Rotation Blue, 2 x10    External Rotation Green, 2 x10    Biceps      Triceps      PNF                                    Manual Intervention (51306)      Scar Massage          Hawkgrips      GH joint mobilizations      Foamroll      Manual PROM: R shoulder ER and flex 8'           NMR re-education (16135)   CUES NEEDED   Plyoback      Therabar oscillations      Body Blade       Rhythmic Stabilization      Ball on the wall                              Therapeutic Activity (23711)      Core training      Swiss ball activities      Education                              Therapeutic Exercise and NMR EXR  [x] (82916) Provided verbal/tactile cueing for activities related to strengthening, flexibility, endurance, ROM  for improvements in scapular, scapulothoracic and UE control with self care, reaching, carrying, lifting, house/yardwork, driving/computer work. [x] (80983) Provided verbal/tactile cueing for activities related to improving balance, coordination, kinesthetic sense, posture, motor skill, proprioception  to assist with  scapular, scapulothoracic and UE control with self care, reaching, carrying, lifting, house/yardwork, driving/computer work. Therapeutic Activities:    [x] (57700 or 75681) Provided verbal/tactile cueing for activities related to improving balance, coordination, kinesthetic sense, posture, motor skill, proprioception and motor activation to allow for proper function of scapular, scapulothoracic and UE control with self care, carrying, lifting, driving/computer work. Home Exercise Program:    [x] (08870) Reviewed/Progressed HEP activities related to strengthening, flexibility, endurance, ROM of scapular, scapulothoracic and UE control with self care, reaching, carrying, lifting, house/yardwork, driving/computer work  [] (51203) Reviewed/Progressed HEP activities related to improving balance, coordination, kinesthetic sense, posture, motor skill, proprioception of scapular, scapulothoracic and UE control with self care, reaching, carrying, lifting, house/yardwork, driving/computer work      Manual Treatments:  PROM / STM / Oscillations-Mobs:  G-I, II, III, IV (PA's, Inf., Post.)  [x] (00183) Provided manual therapy to mobilize soft tissue/joints of cervical/CT, scapular GHJ and UE for the purpose of modulating pain, promoting relaxation,  increasing ROM, reducing/eliminating soft tissue swelling/inflammation/restriction, improving soft tissue extensibility and allowing for proper ROM for normal function with self care, reaching, carrying, lifting, house/yardwork, driving/computer work    Modalities:     [] GAME READY (VASO)- for significant edema, swelling, pain control.   [x] CP to R shoulder x10'    Charges:  Timed Code Treatment Minutes: 54   Total Treatment Minutes: 64       [] EVAL (LOW) 30870 (typically 20 minutes face-to-face)  [] EVAL (MOD) 91783 (typically 30 minutes face-to-face)  [] EVAL (HIGH) 46718 (typically 45 minutes face-to-face)  [] RE-EVAL     [x] YA(26783) x  1 (16') [] IONTO  [x] NMR (12191) x 1 (12')    [] VASO  [x] Manual (34059) x1   (8')  [] Other:  [x] TA x 1  (18')  [] Mech Traction (89387)  [] ES(attended) (51219)      [] ES (un) (00243):          ASSESSMENT:  Patient demonstrates good carryover from HEP with all exercises. Patient required minimal verbal cuing to maintain appropriate from during stretching exercises. Strengthening exercises progressed today with increase in resistance in TB row, extension and IR, with very good tolerance by patient. Progression of RTP golf program was discussed with patient. PT will introduce CBA and open book stretching NPV to address current complaints of stiffness with initial phases of return to golf program. Plan to progress strengthening program by increasing TB resistance and introducing PREs NPV. GOALS:  Patient stated goal: Able to regain ROM and perform all tasks without pain  [x] Progressing: [] Met: [] Not Met: [] Adjusted    Therapist goals for Patient:   Short Term Goals: To be achieved in: 2 weeks  1. Independent in HEP and progression per patient tolerance, in order to prevent re-injury. [] Progressing: [x] Met: [] Not Met: [] Adjusted  2. Patient will have a decrease in pain to facilitate improvement in movement, function, and ADLs as indicated by Functional Deficits. [] Progressing: [x] Met: [] Not Met: [] Adjusted    Long Term Goals: To be achieved in: 16 weeks  1. Disability index score of 15% or less for the UEFI to assist with reaching prior level of function. [x] Progressing: [] Met: [] Not Met: [] Adjusted  2. Patient will demonstrate increased R shoulder flexion, abduction, ER, and IR AROM to >155°, >155°, T2, and T10 respectively to allow for proper joint functioning as indicated by patients Functional Deficits.    [x] Progressing: [] Met: [] Not Met: [] Adjusted  3. Patient will demonstrate an increase in R shoulder strength in all planes  to 4+/5 or greater to allow for proper functional mobility as indicated by patients Functional Deficits. [x] Progressing: [] Met: [] Not Met: [] Adjusted  4. Patient will return to all functional activities without increased symptoms or restriction. [x] Progressing: [] Met: [] Not Met: [] Adjusted  5. Patient will be able to complete all bathing and dressing ADLs without pain or compensation so that she may return to PLOF for all ADLs. [] Progressing: [x] Met: [] Not Met: [] Adjusted        Overall Progression Towards Functional goals/ Treatment Progress Update:  [x] Patient is progressing as expected towards functional goals listed. [] Progression is slowed due to complexities/Impairments listed. [] Progression has been slowed due to co-morbidities. [] Plan just implemented, too soon to assess goals progression <30days   [] Goals require adjustment due to lack of progress  [] Patient is not progressing as expected and requires additional follow up with physician  [] Other    Prognosis for POC: [x] Good [] Fair  [] Poor      Patient requires continued skilled intervention: [x] Yes  [] No    Treatment/Activity Tolerance:  [x] Patient able to complete treatment  [] Patient limited by fatigue  [] Patient limited by pain    [] Patient limited by other medical complications  [] Other:           PLAN: 1-2x/week for 6 weeks;   [x] Continue per plan of care [] Alter current plan   [] Plan of care initiated [] Hold pending MD visit [] Discharge      Electronically signed by:   Russella Snellen, Student Physical Therapist      Therapist was present, directed the patient's care, made skilled judgement, and was responsible for assessment and treatment of the patient.          Afia Rock, PT, DPT, OCS, OMT-C        [place] PT license: [id number]  [place] PT license: [id number]        Note: If patient does not return for scheduled/ recommended follow up visits, this note will serve as a discharge from care along with most recent update on progress.

## 2020-09-08 ENCOUNTER — TREATMENT (OUTPATIENT)
Dept: PHYSICAL THERAPY | Age: 55
End: 2020-09-08
Payer: COMMERCIAL

## 2020-09-08 PROCEDURE — 97110 THERAPEUTIC EXERCISES: CPT | Performed by: PHYSICAL THERAPIST

## 2020-09-08 PROCEDURE — 97140 MANUAL THERAPY 1/> REGIONS: CPT | Performed by: PHYSICAL THERAPIST

## 2020-09-08 PROCEDURE — 97112 NEUROMUSCULAR REEDUCATION: CPT | Performed by: PHYSICAL THERAPIST

## 2020-09-08 PROCEDURE — 97530 THERAPEUTIC ACTIVITIES: CPT | Performed by: PHYSICAL THERAPIST

## 2020-09-08 NOTE — PROGRESS NOTES
[facility]   Phone: [phone]    Fax: [phone]        Physical Therapy Treatment Note/ Progress Report:           Date:  2020    Patient Name:  Mary Anne Crawford    :  1965  MRN: <[id number]>  Restrictions/Precautions:    Medical/Treatment Diagnosis Information:  · Diagnosis: R shoulder OA s/p TSA  · DOS:   Insurance/Certification information:  PT Insurance Information: [facility]  Physician Information:  Referring Practitioner: Dr. Nino Devine  Has the plan of care been signed (Y/N):        [x]  Yes  []  No      Date of Patient follow up with Physician: 2020      Is this a Progress Report:     []  Yes  [x]  No        If Yes:  Date Range for reporting period:  Beginnin2020   Endin2020    Progress report will be due (10 Rx or 30 days whichever is less):       Recertification will be due (POC Duration  / 90 days whichever is less): 2020        Visit # Insurance Allowable Auth Required   20 90 (hard max) []  Yes [x]  No        Functional Scale: UEFI: 54/80, 32.5% limitaiton   Date assessed: 2020     Latex Allergy:  [x]NO      []YES  Preferred Language for Healthcare:   [x]English       []other:      Pain level:  0-1/10     SUBJECTIVE: Patient notes that she was able to go bike riding and swimming this weekend with no increased R shoulder pain. She notes moderate stiffness after these activities, but that with stretching from her HEP, stiffness resolves.     15+ weeks postop    OBJECTIVE: See eval       ROM PROM AROM  Comment:  2020    L R L R    Flexion  160° (pulley) 160° 150°    Abduction   168° 133°    ER   T3 C7    IR   T9 T12    Elbow Flex        Elbow Ext              Strength L R Comment:  2020   Flexion  3+/5    Abduction  3+/5    ER  3+/5    IR  4-/5            Special Tests Results/Comment: deferred secondary to surgery:  2020        RESTRICTIONS/PRECAUTIONS: R TSA    Exercises/Interventions: Therapeutic Ex (76273) Sets/sec Reps Notes/CUES   AD UE BIKE            STRETCHING/ROM      Pulleys 10\" x10 Flex and scap   Table Slides 10\" x10   Wand: seated 10\" x10   Supine wand ER 10\" x10   Supine wand flexion 10\" x10    UE [place] 10\" x10 Flex and scap   Pendulum  x30 F/b, s/s   Table walk back 10\" x10    Doorway 10\" x10    Wall Slides 10\" x10    Biceps stretch with cane 10\" x10    CBA 10\" x10       BBIR 10\" x5    Sleeper       Elbow AROM 6# x20    Wrist 4 way 6# x20    Upper trap stretch 10\" x10    Levator stretch 10\" x10    Open Books 10\" x10          ISOMETRICS      Gripping     Finger ext: rubber band     Shrugs    Retraction    Abduction 10\" x10    Flexion      Internal Rotation 10\" x10    External Rotation 10\" x10          STRENGTHENING-PREs      Flexion      Abduction      Internal Rotation      External Rotation      Shrugs      Biceps      Triceps      Retraction      Extension      Horizontal Abduction in ER      Serratus                        THERABANDS/CABLE COLUMN      Rows Black, 2 x10    Lats      Extension Black, 2 x10    Internal Rotation Blue, 2 x10    External Rotation Green, 2 x10    Biceps      Triceps      PNF                                    Manual Intervention (13828)      Scar Massage          Hawkgrips      GH joint mobilizations      Foamroll      Manual PROM: R shoulder ER and flex 8'           NMR re-education (70095)   CUES NEEDED   Plyoback      Therabar oscillations      Body Blade       Rhythmic Stabilization      Ball on the wall                              Therapeutic Activity (27337)      Core training      Swiss ball activities      Education                              Therapeutic Exercise and NMR EXR  [x] (32310) Provided verbal/tactile cueing for activities related to strengthening, flexibility, endurance, ROM  for improvements in scapular, scapulothoracic and UE control with self care, reaching, carrying, lifting, house/yardwork, driving/computer work.     [x]

## 2020-09-11 ENCOUNTER — TREATMENT (OUTPATIENT)
Dept: PHYSICAL THERAPY | Age: 55
End: 2020-09-11
Payer: COMMERCIAL

## 2020-09-11 PROCEDURE — 97110 THERAPEUTIC EXERCISES: CPT | Performed by: PHYSICAL THERAPIST

## 2020-09-11 PROCEDURE — 97140 MANUAL THERAPY 1/> REGIONS: CPT | Performed by: PHYSICAL THERAPIST

## 2020-09-11 PROCEDURE — 97112 NEUROMUSCULAR REEDUCATION: CPT | Performed by: PHYSICAL THERAPIST

## 2020-09-11 PROCEDURE — 97530 THERAPEUTIC ACTIVITIES: CPT | Performed by: PHYSICAL THERAPIST

## 2020-09-11 NOTE — PROGRESS NOTES
[facility]   Phone: [phone]    Fax: [phone]        Physical Therapy Treatment Note/ Progress Report:           Date:  2020    Patient Name:  Grzegorz Pablo    :  1965  MRN: <[id number]>  Restrictions/Precautions:    Medical/Treatment Diagnosis Information:  · Diagnosis: R shoulder OA s/p TSA  · DOS: 1954  Insurance/Certification information:  PT Insurance Information: [employer]  Physician Information:  Referring Practitioner: Dr. Gardner Ly  Has the plan of care been signed (Y/N):        [x]  Yes  []  No      Date of Patient follow up with Physician: 2020      Is this a Progress Report:     []  Yes  [x]  No        If Yes:  Date Range for reporting period:  Beginnin2020   Endin2020    Progress report will be due (10 Rx or 30 days whichever is less):       Recertification will be due (POC Duration  / 90 days whichever is less): 2020        Visit # Insurance Allowable Auth Required    90 (hard max) []  Yes [x]  No        Functional Scale: UEFI: 54/80, 32.5% limitaiton   Date assessed: 2020     Latex Allergy:  [x]NO      []YES  Preferred Language for Healthcare:   [x]English       []other:      Pain level:  0-1/10     SUBJECTIVE: Patient notes that her R shoulder is doing well today, but she notes that she really over did it the last 2 days. She notes that she went for a 10 mile bike ride on Wednesday, where she also lifted her bike onto her bike rack, and then played golf yesterday.  She reports that she feels that her ROM continues to improve, but it is still tight, especially when reaching across her body with the R UE.    15+ weeks postop    OBJECTIVE: See eval       ROM PROM AROM  Comment:  2020    L R L R    Flexion  160° (pulley) 160° 150°    Abduction   168° 133°    ER   T3 C7    IR   T9 T12    Elbow Flex        Elbow Ext              Strength L R Comment:  2020   Flexion  3+/5    Abduction  3+/5 ER  3+/5    IR  4-/5            Special Tests Results/Comment: deferred secondary to surgery:  8/24/2020        RESTRICTIONS/PRECAUTIONS: R TSA    Exercises/Interventions:     Therapeutic Ex (80389) Sets/sec Reps Notes/CUES   AD UE BIKE            STRETCHING/ROM      Pulleys 10\" x10 Flex and scap   Table Slides 10\" x10   Wand: seated 10\" x10   Supine wand ER 10\" x10   Supine wand flexion 10\" x10    UE [place] 10\" x10 Flex and scap   Pendulum  x30 F/b, s/s   Table walk back 10\" x10    Doorway 10\" x10    Wall Slides 10\" x10    Biceps stretch with cane 10\" x10    CBA 10\" x10       BBIR 10\" x5    Sleeper       Elbow AROM 6# x20    Wrist 4 way 6# x20    Upper trap stretch 10\" x10    Levator stretch 10\" x10    Open Books 10\" x10          ISOMETRICS      Gripping     Finger ext: rubber band     Shrugs    Retraction    Abduction 10\" x10    Flexion      Internal Rotation 10\" x10    External Rotation 10\" x10          STRENGTHENING-PREs      Flexion      Abduction      Internal Rotation      External Rotation      Shrugs      Biceps      Triceps      Retraction      Extension      Horizontal Abduction in ER      Serratus                        THERABANDS/CABLE COLUMN      Rows Black, 2 x10    Lats      Extension Black, 2 x10    Internal Rotation Blue, 2 x10    External Rotation Blue, 2 x10    Biceps      Triceps      PNF                                    Manual Intervention (23345)      Scar Massage          Hawkgrips      GH joint mobilizations      Foamroll      Manual PROM: R shoulder ER and flex 6'     Manual CBA stretch with scap block 4'           NMR re-education (53507)   CUES NEEDED   Plyoback      Therabar oscillations      Body Blade       Rhythmic Stabilization      Ball on the wall                              Therapeutic Activity (69556)      Core training      Swiss ball activities      Education                              Therapeutic Exercise and NMR EXR  [x] (40058) Provided verbal/tactile cueing for activities related to strengthening, flexibility, endurance, ROM  for improvements in scapular, scapulothoracic and UE control with self care, reaching, carrying, lifting, house/yardwork, driving/computer work. [x] (11683) Provided verbal/tactile cueing for activities related to improving balance, coordination, kinesthetic sense, posture, motor skill, proprioception  to assist with  scapular, scapulothoracic and UE control with self care, reaching, carrying, lifting, house/yardwork, driving/computer work. Therapeutic Activities:    [x] (61431 or 86616) Provided verbal/tactile cueing for activities related to improving balance, coordination, kinesthetic sense, posture, motor skill, proprioception and motor activation to allow for proper function of scapular, scapulothoracic and UE control with self care, carrying, lifting, driving/computer work.      Home Exercise Program:    [x] (97717) Reviewed/Progressed HEP activities related to strengthening, flexibility, endurance, ROM of scapular, scapulothoracic and UE control with self care, reaching, carrying, lifting, house/yardwork, driving/computer work  [] (34574) Reviewed/Progressed HEP activities related to improving balance, coordination, kinesthetic sense, posture, motor skill, proprioception of scapular, scapulothoracic and UE control with self care, reaching, carrying, lifting, house/yardwork, driving/computer work      Manual Treatments:  PROM / STM / Oscillations-Mobs:  G-I, II, III, IV (PA's, Inf., Post.)  [x] (53765) Provided manual therapy to mobilize soft tissue/joints of cervical/CT, scapular GHJ and UE for the purpose of modulating pain, promoting relaxation,  increasing ROM, reducing/eliminating soft tissue swelling/inflammation/restriction, improving soft tissue extensibility and allowing for proper ROM for normal function with self care, reaching, carrying, lifting, house/yardwork, driving/computer work    Modalities:     [] GAME READY (VASO)- for significant edema, swelling, pain control. [x] CP to R shoulder x10'    Charges:  Timed Code Treatment Minutes: 55   Total Treatment Minutes: 75       [] EVAL (LOW) 13495 (typically 20 minutes face-to-face)  [] EVAL (MOD) 88374 (typically 30 minutes face-to-face)  [] EVAL (HIGH) 17462 (typically 45 minutes face-to-face)  [] RE-EVAL     [x] AM(68776) x  1 (15') [] IONTO  [x] NMR (25278) x 1 (12')    [] VASO  [x] Manual (14587) x1   (10')  [] Other:  [x] TA x 1  (18')  [] Mech Traction (41176)  [] ES(attended) (55181)      [] ES (un) (86650):          ASSESSMENT: Patient was educated on avoiding lifting more than 10-15# with R UE and not over shoulder height. She was re-educated on self blocking scapula with CBA stretch at wall. She reports feeling mch more intense stretch with manual scap blocking during abduction PROM stretching and manual CBA stretch by PT today. Patient is able to progress resistance for TB ER with good form, demonstrating increased strength. Plan to progress functional activities per patient tolerance and postop protocol. GOALS:  Patient stated goal: Able to regain ROM and perform all tasks without pain  [x] Progressing: [] Met: [] Not Met: [] Adjusted    Therapist goals for Patient:   Short Term Goals: To be achieved in: 2 weeks  1. Independent in HEP and progression per patient tolerance, in order to prevent re-injury. [] Progressing: [x] Met: [] Not Met: [] Adjusted  2. Patient will have a decrease in pain to facilitate improvement in movement, function, and ADLs as indicated by Functional Deficits. [] Progressing: [x] Met: [] Not Met: [] Adjusted    Long Term Goals: To be achieved in: 16 weeks  1. Disability index score of 15% or less for the UEFI to assist with reaching prior level of function. [x] Progressing: [] Met: [] Not Met: [] Adjusted  2.  Patient will demonstrate increased R shoulder flexion, abduction, ER, and IR AROM to >155°, >155°, T2, and T10 respectively to allow for proper joint functioning as indicated by patients Functional Deficits. [x] Progressing: [] Met: [] Not Met: [] Adjusted  3. Patient will demonstrate an increase in R shoulder strength in all planes  to 4+/5 or greater to allow for proper functional mobility as indicated by patients Functional Deficits. [x] Progressing: [] Met: [] Not Met: [] Adjusted  4. Patient will return to all functional activities without increased symptoms or restriction. [x] Progressing: [] Met: [] Not Met: [] Adjusted  5. Patient will be able to complete all bathing and dressing ADLs without pain or compensation so that she may return to PLOF for all ADLs. [] Progressing: [x] Met: [] Not Met: [] Adjusted        Overall Progression Towards Functional goals/ Treatment Progress Update:  [x] Patient is progressing as expected towards functional goals listed. [] Progression is slowed due to complexities/Impairments listed. [] Progression has been slowed due to co-morbidities.   [] Plan just implemented, too soon to assess goals progression <30days   [] Goals require adjustment due to lack of progress  [] Patient is not progressing as expected and requires additional follow up with physician  [] Other    Prognosis for POC: [x] Good [] Fair  [] Poor      Patient requires continued skilled intervention: [x] Yes  [] No    Treatment/Activity Tolerance:  [x] Patient able to complete treatment  [] Patient limited by fatigue  [] Patient limited by pain    [] Patient limited by other medical complications  [] Other:           PLAN: 1-2x/week for 6 weeks;   [x] Continue per plan of care [] Alter current plan   [] Plan of care initiated [] Hold pending MD visit [] Discharge      Electronically signed by:          Maury Michelle, PT, DPT, OCS, OMT-C        [place] PT license: [id number]  [place] PT license: [id number]        Note: If patient does not return for scheduled/ recommended follow up visits, this note will serve as a discharge from care along with

## 2020-09-15 ENCOUNTER — TREATMENT (OUTPATIENT)
Dept: PHYSICAL THERAPY | Age: 55
End: 2020-09-15
Payer: COMMERCIAL

## 2020-09-15 PROCEDURE — 97140 MANUAL THERAPY 1/> REGIONS: CPT | Performed by: PHYSICAL THERAPIST

## 2020-09-15 PROCEDURE — 97112 NEUROMUSCULAR REEDUCATION: CPT | Performed by: PHYSICAL THERAPIST

## 2020-09-15 PROCEDURE — 97530 THERAPEUTIC ACTIVITIES: CPT | Performed by: PHYSICAL THERAPIST

## 2020-09-15 PROCEDURE — 97110 THERAPEUTIC EXERCISES: CPT | Performed by: PHYSICAL THERAPIST

## 2020-09-15 NOTE — PROGRESS NOTES
[facility]   Phone: [phone]    Fax: [phone]        Physical Therapy Treatment Note/ Progress Report:           Date:  9/15/2020    Patient Name:  Arnaud Siddiqui    :  1965  MRN: <[id number]>  Restrictions/Precautions:    Medical/Treatment Diagnosis Information:  · Diagnosis: R shoulder OA s/p TSA  · DOS:   Insurance/Certification information:  PT Insurance Information: [facility]  Physician Information:  Referring Practitioner: Dr. Manuela Liao  Has the plan of care been signed (Y/N):        [x]  Yes  []  No      Date of Patient follow up with Physician: 2020      Is this a Progress Report:     []  Yes  [x]  No        If Yes:  Date Range for reporting period:  Beginnin2020   Endin2020    Progress report will be due (10 Rx or 30 days whichever is less): 197      Recertification will be due (POC Duration  / 90 days whichever is less): 2020        Visit # Insurance Allowable Auth Required   22 90 (hard max) []  Yes [x]  No        Functional Scale: UEFI: 54/80, 32.5% limitaiton   Date assessed: 2020     Latex Allergy:  [x]NO      []YES  Preferred Language for Healthcare:   [x]English       []other:      Pain level:  0-1/10     SUBJECTIVE: Patient notes she has very little pain in R shoulder and it isn't bothering her with normal activities. She reports she has been practicing her new stretches at home and is enjoying it. Patient states she went golfing yesterday and is improving her swing and follow through but still feels a little sore all over.     15+ weeks postop    OBJECTIVE: See eval       ROM PROM AROM  Comment:  2020    L R L R    Flexion  160° (pulley) 160° 150°    Abduction   168° 133°    ER   T3 C7    IR   T9 T12    Elbow Flex        Elbow Ext              Strength L R Comment:  2020   Flexion  3+/5    Abduction  3+/5    ER  3+/5    IR  4-/5            Special Tests Results/Comment: deferred secondary to CP to R shoulder x10'    Charges:  Timed Code Treatment Minutes: 66   Total Treatment Minutes: 76       [] EVAL (LOW) 46047 (typically 20 minutes face-to-face)  [] EVAL (MOD) 65203 (typically 30 minutes face-to-face)  [] EVAL (HIGH) 42312 (typically 45 minutes face-to-face)  [] RE-EVAL     [x] AU(77119) x  1 (22') [] IONTO  [x] NMR (51882) x 1 (14')    [] VASO  [x] Manual (18883) x1   (10')  [] Other:  [x] TA x 1  (20')  [] Mech Traction (59840)  [] ES(attended) (13261)      [] ES (un) (47898):          ASSESSMENT:  Zearing Abo on the wall exercise and side lying ER PRE were introduced today with good form and good tolerance demonstrated by patient. Patient required minimal verbal cuing to maintain and hold proper form and exhibited moderate fatigue with the newly introduced ER PRE. Patient and PT noted increase in range during manual CBA stretch with scapular blocking compared to last week. Plan to introduce prone PREs and progress stretching exercises NPV. GOALS:  Patient stated goal: Able to regain ROM and perform all tasks without pain  [x] Progressing: [] Met: [] Not Met: [] Adjusted    Therapist goals for Patient:   Short Term Goals: To be achieved in: 2 weeks  1. Independent in HEP and progression per patient tolerance, in order to prevent re-injury. [] Progressing: [x] Met: [] Not Met: [] Adjusted  2. Patient will have a decrease in pain to facilitate improvement in movement, function, and ADLs as indicated by Functional Deficits. [] Progressing: [x] Met: [] Not Met: [] Adjusted    Long Term Goals: To be achieved in: 16 weeks  1. Disability index score of 15% or less for the UEFI to assist with reaching prior level of function. [x] Progressing: [] Met: [] Not Met: [] Adjusted  2. Patient will demonstrate increased R shoulder flexion, abduction, ER, and IR AROM to >155°, >155°, T2, and T10 respectively to allow for proper joint functioning as indicated by patients Functional Deficits.    [x] Progressing: [] Met: [] Not Met: [] Adjusted  3. Patient will demonstrate an increase in R shoulder strength in all planes  to 4+/5 or greater to allow for proper functional mobility as indicated by patients Functional Deficits. [x] Progressing: [] Met: [] Not Met: [] Adjusted  4. Patient will return to all functional activities without increased symptoms or restriction. [x] Progressing: [] Met: [] Not Met: [] Adjusted  5. Patient will be able to complete all bathing and dressing ADLs without pain or compensation so that she may return to OF for all ADLs. [] Progressing: [x] Met: [] Not Met: [] Adjusted        Overall Progression Towards Functional goals/ Treatment Progress Update:  [x] Patient is progressing as expected towards functional goals listed. [] Progression is slowed due to complexities/Impairments listed. [] Progression has been slowed due to co-morbidities. [] Plan just implemented, too soon to assess goals progression <30days   [] Goals require adjustment due to lack of progress  [] Patient is not progressing as expected and requires additional follow up with physician  [] Other    Prognosis for POC: [x] Good [] Fair  [] Poor      Patient requires continued skilled intervention: [x] Yes  [] No    Treatment/Activity Tolerance:  [x] Patient able to complete treatment  [] Patient limited by fatigue  [] Patient limited by pain    [] Patient limited by other medical complications  [] Other:           PLAN: 1-2x/week for 6 weeks;   [x] Continue per plan of care [] Alter current plan   [] Plan of care initiated [] Hold pending MD visit [] Discharge      Electronically signed by:     May Norris, Student Physical Therapist      Therapist was present, directed the patient's care, made skilled judgement, and was responsible for assessment and treatment of the patient.            Sonia Norris, PT, DPT, OCS, OMT-C        [id number] PT license: [id number]  [place] PT license: [id number]        Note: If patient does not return for scheduled/ recommended follow up visits, this note will serve as a discharge from care along with most recent update on progress.

## 2020-09-23 ENCOUNTER — TREATMENT (OUTPATIENT)
Dept: PHYSICAL THERAPY | Age: 55
End: 2020-09-23
Payer: COMMERCIAL

## 2020-09-23 PROCEDURE — 97140 MANUAL THERAPY 1/> REGIONS: CPT | Performed by: PHYSICAL THERAPIST

## 2020-09-23 PROCEDURE — 97530 THERAPEUTIC ACTIVITIES: CPT | Performed by: PHYSICAL THERAPIST

## 2020-09-23 PROCEDURE — 97112 NEUROMUSCULAR REEDUCATION: CPT | Performed by: PHYSICAL THERAPIST

## 2020-09-23 PROCEDURE — 97110 THERAPEUTIC EXERCISES: CPT | Performed by: PHYSICAL THERAPIST

## 2020-09-23 NOTE — PROGRESS NOTES
Joe Galvan [facility]   Phone: [phone]    Fax: [phone]   Physical Therapy Re-Certification Plan of Care    Dear Dr. Uziel Zuniga,    We had the pleasure of treating the following patient for physical therapy services at [address] [place] [address]. A summary of our findings can be found in the updated assessment below. This includes our plan of care. If you have any questions or concerns regarding these findings, please do not hesitate to contact me at the office phone number checked above.   Thank you for the referral.     Physician Signature:________________________________Date:__________________  By signing above (or electronic signature), therapists plan is approved by physician      Overall Response to Treatment:   [x]Patient is responding well to treatment and improvement is noted with regards  to goals   []Patient should continue to improve in reasonable time if they continue HEP   []Patient has plateaued and is no longer responding to skilled PT intervention    []Patient is getting worse and would benefit from return to referring MD   []Patient unable to adhere to initial POC   []Other:           Physical Therapy Treatment Note/ Progress Report:           Date:  2020    Patient Name:  Judge Teresa    :  1965  MRN: <[id number]>  Restrictions/Precautions:    Medical/Treatment Diagnosis Information:  · Diagnosis: R shoulder OA s/p TSA  · DOS: 5958  Insurance/Certification information:  PT Insurance Information: BCBS  Physician Information:  Referring Practitioner: Dr. Uziel Zuniga  Has the plan of care been signed (Y/N):        []  Yes  [x]  No      Date of Patient follow up with Physician: 10/27/2020      Is this a Progress Report:     [x]  Yes  []  No        If Yes:  Date Range for reporting period:  Beginnin2020 Update NPV  Endin2020    Progress report will be due (10 Rx or 30 days whichever is less): 63/93/8896      Recertification will be due (POC Duration  / 90 days whichever is less): 10/23/2020        Visit # Insurance Allowable Auth Required   23 90 (hard max) []  Yes [x]  No        Functional Scale: UEFI: 54/80, 32.5% limitation- Update NPV    Date assessed: 8/24/2020     Latex Allergy:  [x]NO      []YES  Preferred Language for Healthcare:   [x]English       []other:      Pain level:  1-2/10     SUBJECTIVE:  Patient reports pain being very low in R shoulder. She states she was able to golf last week but she knows when to stop herself and not overdo it with her shoulder. Patient notes pain in R shoulder is worse the day after she golfs and not the day of golf.       18+ weeks postop    OBJECTIVE: See eval       ROM PROM AROM  Comment:  9/23/2020    L R L R    Flexion  160° (pulley) 160° 154°    Abduction   168° 151°    ER   T3 T1    IR   T9 T10    Elbow Flex        Elbow Ext              Strength L R Comment:  9/23/2020   Flexion  4/5    Abduction  4-/5    ER  4-/5    IR  4/5            Special Tests Results/Comment: deferred secondary to surgery:  8/24/2020        RESTRICTIONS/PRECAUTIONS: R TSA    Exercises/Interventions:     Therapeutic Ex (92725) Sets/sec Reps Notes/CUES   AD UE BIKE            STRETCHING/ROM      Pulleys 10\" x10 Flex and scap   Table Slides 10\" x10   Wand: seated 10\" x10   Supine wand ER 10\" x10   Supine wand flexion 10\" x10    UE [place] 10\" x10 Flex and scap   Pendulum  x30 F/b, s/s   Table walk back 10\" x10    Doorway 10\" x10    Doorway pec stretch 10\" x10    Wall Slides 10\" x10    Biceps stretch with cane 10\" x10    CBA 10\" x10       BBIR 10\" x5    Sleeper       Elbow AROM 6# x20    Wrist 4 way 6# x20    Upper trap stretch 10\" x10    Levator stretch 10\" x10    Open Books 10\" x10          ISOMETRICS      Gripping     Finger ext: rubber band     Shrugs    Retraction    Abduction 10\" x10    Flexion      Internal Rotation 10\" x10    External Rotation 10\" x10          STRENGTHENING-PREs Flexion      Abduction      Internal Rotation      External Rotation 3#, 2 x10    Shrugs      Biceps      Triceps      Retraction 4#, 2 x10    Extension 4#, 2 x10    Horizontal Abduction in ER      Serratus                        THERABANDS/CABLE COLUMN      Rows Black, 2 x10 Increase NPV   Lats      Extension Black, 2 x10 Increase NPV   Internal Rotation Black, 2 x10    External Rotation Black, 2 x10    Biceps      Triceps      PNF                                    Manual Intervention (56247)      Scar Massage          Hawkgrips      GH joint mobilizations      Foamroll      Manual PROM: R shoulder ER and flex 6'     Manual CBA stretch with scap block 4'           NMR re-education (45841)   CUES NEEDED   Plyoback      Therabar oscillations      Body Blade       Rhythmic Stabilization      Ball on the wall x2 x30' Cc/cw  Increase NPV                           Therapeutic Activity (21722)      Core training      Swiss ball activities      Education                              Therapeutic Exercise and NMR EXR  [x] (10323) Provided verbal/tactile cueing for activities related to strengthening, flexibility, endurance, ROM  for improvements in scapular, scapulothoracic and UE control with self care, reaching, carrying, lifting, house/yardwork, driving/computer work. [x] (45875) Provided verbal/tactile cueing for activities related to improving balance, coordination, kinesthetic sense, posture, motor skill, proprioception  to assist with  scapular, scapulothoracic and UE control with self care, reaching, carrying, lifting, house/yardwork, driving/computer work. Therapeutic Activities:    [x] (27206 or 96630) Provided verbal/tactile cueing for activities related to improving balance, coordination, kinesthetic sense, posture, motor skill, proprioception and motor activation to allow for proper function of scapular, scapulothoracic and UE control with self care, carrying, lifting, driving/computer work.      Home Exercise Program:    [x] (36325) Reviewed/Progressed HEP activities related to strengthening, flexibility, endurance, ROM of scapular, scapulothoracic and UE control with self care, reaching, carrying, lifting, house/yardwork, driving/computer work  [] (69139) Reviewed/Progressed HEP activities related to improving balance, coordination, kinesthetic sense, posture, motor skill, proprioception of scapular, scapulothoracic and UE control with self care, reaching, carrying, lifting, house/yardwork, driving/computer work       Manual Treatments:  PROM / STM / Oscillations-Mobs:  G-I, II, III, IV (PA's, Inf., Post.)  [x] (62875) Provided manual therapy to mobilize soft tissue/joints of cervical/CT, scapular GHJ and UE for the purpose of modulating pain, promoting relaxation,  increasing ROM, reducing/eliminating soft tissue swelling/inflammation/restriction, improving soft tissue extensibility and allowing for proper ROM for normal function with self care, reaching, carrying, lifting, house/yardwork, driving/computer work    Modalities:     [] GAME READY (VASO)- for significant edema, swelling, pain control. [x] CP to R shoulder x10'    Charges:  Timed Code Treatment Minutes: 75   Total Treatment Minutes: 85       [] EVAL (LOW) 67910 (typically 20 minutes face-to-face)  [] EVAL (MOD) 11250 (typically 30 minutes face-to-face)  [] EVAL (HIGH) 35074 (typically 45 minutes face-to-face)  [] RE-EVAL     [x] FK(54228) x  2 (30') [] IONTO  [x] NMR (48498) x 1 (15')    [] VASO  [x] Manual (64383) x1   (10')  [] Other:  [x] TA x 1  (20')  [] Mech Traction (85685)  [] ES(attended) (80860)      [] ES (un) (37939):          ASSESSMENT:   Patient continues to demonstrate progress towards all LTGs previously set by PT. Patient exhibits increase in all planes of R shoulder AROM, demonstrating progress.  Bilateral doorway pec stretch introduced today to address complaints of tightness in patient's chest musculature with patient demonstrating appropriate form. Resistance for TB IR and ER increased today with patient noting slightly more fatigue than usual. Prone rows and extension PREs introduced today with patient tolerating exercise very well and minimal verbal cuing to maintain proper form. Patient would benefit from future skilled PT to address ROM and strength deficits in order to return to PLOF to perform ADLs and return to golf without increased symptoms. GOALS:  Patient stated goal: Able to regain ROM and perform all tasks without pain  [x] Progressing: [] Met: [] Not Met: [] Adjusted    Therapist goals for Patient:   Short Term Goals: To be achieved in: 2 weeks  1. Independent in HEP and progression per patient tolerance, in order to prevent re-injury. [] Progressing: [x] Met: [] Not Met: [] Adjusted  2. Patient will have a decrease in pain to facilitate improvement in movement, function, and ADLs as indicated by Functional Deficits. [] Progressing: [x] Met: [] Not Met: [] Adjusted    Long Term Goals: To be achieved in: 16 weeks  1. Disability index score of 15% or less for the UEFI to assist with reaching prior level of function. [x] Progressing: [] Met: [] Not Met: [] Adjusted  2. Patient will demonstrate increased R shoulder flexion, abduction, ER, and IR AROM to >155°, >155°, T2, and T10 respectively to allow for proper joint functioning as indicated by patients Functional Deficits. [x] Progressing: [] Met: [] Not Met: [] Adjusted  3. Patient will demonstrate an increase in R shoulder strength in all planes  to 4+/5 or greater to allow for proper functional mobility as indicated by patients Functional Deficits. [x] Progressing: [] Met: [] Not Met: [] Adjusted  4. Patient will return to all functional activities without increased symptoms or restriction. [x] Progressing: [] Met: [] Not Met: [] Adjusted  5.  Patient will be able to complete all bathing and dressing ADLs without pain or compensation so that she may return to PLOF for all ADLs. [] Progressing: [x] Met: [] Not Met: [] Adjusted        Overall Progression Towards Functional goals/ Treatment Progress Update:  [x] Patient is progressing as expected towards functional goals listed. [] Progression is slowed due to complexities/Impairments listed. [] Progression has been slowed due to co-morbidities. [] Plan just implemented, too soon to assess goals progression <30days   [] Goals require adjustment due to lack of progress  [] Patient is not progressing as expected and requires additional follow up with physician  [] Other    Prognosis for POC: [x] Good [] Fair  [] Poor      Patient requires continued skilled intervention: [x] Yes  [] No    Treatment/Activity Tolerance:  [x] Patient able to complete treatment  [] Patient limited by fatigue  [] Patient limited by pain    [] Patient limited by other medical complications  [] Other:           PLAN: 1-2x/week for 6 weeks;   [x] Continue per plan of care [] Alter current plan   [] Plan of care initiated [] Hold pending MD visit [] Discharge      Electronically signed by:     Raeann Guzman, Student Physical Therapist      Therapist was present, directed the patient's care, made skilled judgement, and was responsible for assessment and treatment of the patient. Leslee Vasquez, PT, DPT, OCS, OMT-C        [place] PT license: [id number]  [place] PT license: [id number]        Note: If patient does not return for scheduled/ recommended follow up visits, this note will serve as a discharge from care along with most recent update on progress.

## 2020-09-30 ENCOUNTER — TREATMENT (OUTPATIENT)
Dept: PHYSICAL THERAPY | Age: 55
End: 2020-09-30
Payer: COMMERCIAL

## 2020-09-30 PROCEDURE — 97110 THERAPEUTIC EXERCISES: CPT | Performed by: PHYSICAL THERAPIST

## 2020-09-30 PROCEDURE — 97140 MANUAL THERAPY 1/> REGIONS: CPT | Performed by: PHYSICAL THERAPIST

## 2020-09-30 PROCEDURE — 97530 THERAPEUTIC ACTIVITIES: CPT | Performed by: PHYSICAL THERAPIST

## 2020-09-30 PROCEDURE — 97112 NEUROMUSCULAR REEDUCATION: CPT | Performed by: PHYSICAL THERAPIST

## 2020-09-30 NOTE — PROGRESS NOTES
Joe Galvan [facility]   Phone: [phone]    Fax: [phone]      Physical Therapy Treatment Note/ Progress Report:           Date:  2020    Patient Name:  Kerry Leos    :  1965  MRN: <[id number]>  Restrictions/Precautions:    Medical/Treatment Diagnosis Information:  · Diagnosis: R shoulder OA s/p TSA  · DOS:   Insurance/Certification information:  PT Insurance Information: BCBS  Physician Information:  Referring Practitioner: Dr. Ce Prabhakar  Has the plan of care been signed (Y/N):        [x]  Yes  []  No      Date of Patient follow up with Physician: 10/27/2020      Is this a Progress Report:     []  Yes  [x]  No        If Yes:  Date Range for reporting period:  Beginnin2020   Ending: 10/23/2020    Progress report will be due (10 Rx or 30 days whichever is less):       Recertification will be due (POC Duration  / 90 days whichever is less): 10/23/2020        Visit # Insurance Allowable Auth Required   24 90 (hard max) []  Yes [x]  No        Functional Scale: UEFI: 54/80, 32.5% limitation- Update NPV    Date assessed: 2020     Latex Allergy:  [x]NO      []YES  Preferred Language for Healthcare:   [x]English       []other:      Pain level:  1-2/10     SUBJECTIVE:  Patient reports she went golfing this past week and R shoulder felt really good. She states she only felt a sore and achy pain in R shoulder the day after but hasn't bothered her since. She states she also played pickleball on Monday of this week for the first time in 4-5 years, which caused increase soreness all over her body including R shoulder.     19+ weeks postop    OBJECTIVE: See eval       ROM PROM AROM  Comment:  2020    L R L R    Flexion  160° (pulley) 160° 154°    Abduction   168° 151°    ER   T3 T1    IR   T9 T10    Elbow Flex        Elbow Ext              Strength L R Comment:  2020   Flexion  4/5    Abduction  4-/5    ER  4-/5    IR  4/5 Special Tests Results/Comment: deferred secondary to surgery:  8/24/2020        RESTRICTIONS/PRECAUTIONS: R TSA    Exercises/Interventions:     Therapeutic Ex (26272) Sets/sec Reps Notes/CUES   AD UE BIKE            STRETCHING/ROM      Pulleys 10\" x10 Flex and scap   Table Slides 10\" x10   Wand: seated 10\" x10   Supine wand [place] 10\" x10   Supine wand flexion 10\" x10    UE [place] 10\" x10 Flex and scap   Pendulum  x30 F/b, s/s   Table walk back 10\" x10    Doorway 10\" x10    Doorway pec stretch 10\" x10    Wall Slides 10\" x10    Biceps stretch with cane 10\" x10    CBA 10\" x10       BBIR 10\" x5    Sleeper       Elbow AROM 7# x20    Wrist 4 way 7# x20    Upper trap stretch 10\" x10    Levator stretch 10\" x10    Open Books 10\" x10          ISOMETRICS      Gripping     Finger ext: rubber band     Shrugs    Retraction    Abduction 10\" x10    Flexion      Internal Rotation 10\" x10    External Rotation 10\" x10          STRENGTHENING-PREs      Flexion      Abduction      Internal Rotation      External Rotation 4#, 2 x10    Shrugs      Biceps      Triceps      Retraction 4#, 2 x10    Extension 4#, 2 x10    Horizontal Abduction in ER      Serratus                        THERABANDS/CABLE COLUMN      Rows [place], 2 x10    Lats      Extension Silver, 2 x10    Internal Rotation Black, 2 x10    External Rotation Black, 2 x10    Biceps      Triceps      PNF                                    Manual Intervention (20786)      Scar Massage          Hawkgrips      GH joint mobilizations      Foamroll      Manual PROM: R shoulder ER and flex 6'     Manual CBA stretch with scap block 4'           NMR re-education (53691)   CUES NEEDED   Plyoback      Therabar oscillations      Body Blade       Rhythmic Stabilization      Ball on the wall 2#, 2x x30' Cc/cw                             Therapeutic Activity (21764)      Core training      Swiss ball activities      Education                              Therapeutic Exercise and NMR EXR  [x] (71507) Provided verbal/tactile cueing for activities related to strengthening, flexibility, endurance, ROM  for improvements in scapular, scapulothoracic and UE control with self care, reaching, carrying, lifting, house/yardwork, driving/computer work. [x] (17793) Provided verbal/tactile cueing for activities related to improving balance, coordination, kinesthetic sense, posture, motor skill, proprioception  to assist with  scapular, scapulothoracic and UE control with self care, reaching, carrying, lifting, house/yardwork, driving/computer work. Therapeutic Activities:    [x] (10954 or 90927) Provided verbal/tactile cueing for activities related to improving balance, coordination, kinesthetic sense, posture, motor skill, proprioception and motor activation to allow for proper function of scapular, scapulothoracic and UE control with self care, carrying, lifting, driving/computer work.      Home Exercise Program:    [x] (32240) Reviewed/Progressed HEP activities related to strengthening, flexibility, endurance, ROM of scapular, scapulothoracic and UE control with self care, reaching, carrying, lifting, house/yardwork, driving/computer work  [] (69733) Reviewed/Progressed HEP activities related to improving balance, coordination, kinesthetic sense, posture, motor skill, proprioception of scapular, scapulothoracic and UE control with self care, reaching, carrying, lifting, house/yardwork, driving/computer work       Manual Treatments:  PROM / STM / Oscillations-Mobs:  G-I, II, III, IV (PA's, Inf., Post.)  [x] (87117) Provided manual therapy to mobilize soft tissue/joints of cervical/CT, scapular GHJ and UE for the purpose of modulating pain, promoting relaxation,  increasing ROM, reducing/eliminating soft tissue swelling/inflammation/restriction, improving soft tissue extensibility and allowing for proper ROM for normal function with self care, reaching, carrying, lifting, house/yardwork, driving/computer work    Modalities:     [] GAME READY (VASO)- for significant edema, swelling, pain control. [x] CP to R shoulder x10'    Charges:  Timed Code Treatment Minutes: 64   Total Treatment Minutes: 74       [] EVAL (LOW) 97210 (typically 20 minutes face-to-face)  [] EVAL (MOD) 46772 (typically 30 minutes face-to-face)  [] EVAL (HIGH) 28583 (typically 45 minutes face-to-face)  [] RE-EVAL     [x] BM(22550) x  1 (22') [] IONTO  [x] NMR (94579) x 1 (12')    [] VASO  [x] Manual (53890) x1   (10')  [] Other:  [x] TA x 1  (20')  [] Mech Traction (87349)  [] ES(attended) (27921)      [] ES (un) (55258):          ASSESSMENT:  Patent demonstrates good carryover with HEP with independence in most activities. Patient requires minimal verbal cuing to maintain proper form in supine wand ER and PREs. Resistance for TB rows and extension were increased today with patient tolerating progression well and proper form demonstrated. Weight for ER PRE was also increased to 4# today with patient demonstrating appropriate form and minimal fatigue throughout all PREs. Minimal limitations were present today in ER and flexion during manual PROM stretching by PT. Plan to progress strengthening exercises NPV. GOALS:  Patient stated goal: Able to regain ROM and perform all tasks without pain  [x] Progressing: [] Met: [] Not Met: [] Adjusted    Therapist goals for Patient:   Short Term Goals: To be achieved in: 2 weeks  1. Independent in HEP and progression per patient tolerance, in order to prevent re-injury. [] Progressing: [x] Met: [] Not Met: [] Adjusted  2. Patient will have a decrease in pain to facilitate improvement in movement, function, and ADLs as indicated by Functional Deficits. [] Progressing: [x] Met: [] Not Met: [] Adjusted    Long Term Goals: To be achieved in: 16 weeks  1. Disability index score of 15% or less for the UEFI to assist with reaching prior level of function.    [x] Progressing: [] Met: [] Not Met: [] Adjusted  2. Patient will demonstrate increased R shoulder flexion, abduction, ER, and IR AROM to >155°, >155°, T2, and T10 respectively to allow for proper joint functioning as indicated by patients Functional Deficits. [x] Progressing: [] Met: [] Not Met: [] Adjusted  3. Patient will demonstrate an increase in R shoulder strength in all planes  to 4+/5 or greater to allow for proper functional mobility as indicated by patients Functional Deficits. [x] Progressing: [] Met: [] Not Met: [] Adjusted  4. Patient will return to all functional activities without increased symptoms or restriction. [x] Progressing: [] Met: [] Not Met: [] Adjusted  5. Patient will be able to complete all bathing and dressing ADLs without pain or compensation so that she may return to PLOF for all ADLs. [] Progressing: [x] Met: [] Not Met: [] Adjusted        Overall Progression Towards Functional goals/ Treatment Progress Update:  [x] Patient is progressing as expected towards functional goals listed. [] Progression is slowed due to complexities/Impairments listed. [] Progression has been slowed due to co-morbidities.   [] Plan just implemented, too soon to assess goals progression <30days   [] Goals require adjustment due to lack of progress  [] Patient is not progressing as expected and requires additional follow up with physician  [] Other    Prognosis for POC: [x] Good [] Fair  [] Poor      Patient requires continued skilled intervention: [x] Yes  [] No    Treatment/Activity Tolerance:  [x] Patient able to complete treatment  [] Patient limited by fatigue  [] Patient limited by pain    [] Patient limited by other medical complications  [] Other:           PLAN: 1-2x/week for 6 weeks;   [x] Continue per plan of care [] Alter current plan   [] Plan of care initiated [] Hold pending MD visit [] Discharge      Electronically signed by:     Brady Parisi, Student Physical Therapist      Therapist was present, directed the patient's care, made skilled judgement, and was responsible for assessment and treatment of the patient. Jhonny Bruno, PT, DPT, OCS, OMT-C        [facility] PT license: [id number]  [place] PT license: [id number]        Note: If patient does not return for scheduled/ recommended follow up visits, this note will serve as a discharge from care along with most recent update on progress.

## 2020-10-07 ENCOUNTER — TREATMENT (OUTPATIENT)
Dept: PHYSICAL THERAPY | Age: 55
End: 2020-10-07
Payer: COMMERCIAL

## 2020-10-07 PROCEDURE — 97110 THERAPEUTIC EXERCISES: CPT | Performed by: PHYSICAL THERAPIST

## 2020-10-07 PROCEDURE — 97530 THERAPEUTIC ACTIVITIES: CPT | Performed by: PHYSICAL THERAPIST

## 2020-10-07 PROCEDURE — 97140 MANUAL THERAPY 1/> REGIONS: CPT | Performed by: PHYSICAL THERAPIST

## 2020-10-07 PROCEDURE — 97112 NEUROMUSCULAR REEDUCATION: CPT | Performed by: PHYSICAL THERAPIST

## 2020-10-07 NOTE — PROGRESS NOTES
Joe Galvan [facility]   Phone: [phone]    Fax: [phone]      Physical Therapy Treatment Note/ Progress Report:           Date:  10/7/2020    Patient Name:  Sharon Gustafson    :  1965  MRN: <[id number]>  Restrictions/Precautions:    Medical/Treatment Diagnosis Information:  · Diagnosis: R shoulder OA s/p TSA  · DOS:   Insurance/Certification information:  PT Insurance Information: BCBS  Physician Information:  Referring Practitioner: Dr. Jacques Borrero  Has the plan of care been signed (Y/N):        [x]  Yes  []  No      Date of Patient follow up with Physician: 10/27/2020      Is this a Progress Report:     []  Yes  [x]  No        If Yes:  Date Range for reporting period:  Beginnin2020   Ending: 10/23/2020    Progress report will be due (10 Rx or 30 days whichever is less):       Recertification will be due (POC Duration  / 90 days whichever is less): 10/23/2020        Visit # Insurance Allowable Auth Required   25 90 (hard max) []  Yes [x]  No        Functional Scale: UEFI: 66/80, 17.5% limitation    Date assessed: 10/7/2020     Latex Allergy:  [x]NO      []YES  Preferred Language for Healthcare:   [x]English       []other:      Pain level:  1-2/10     SUBJECTIVE:  Patient reports she is still very pleased with pain in R shoulder. She states she golfed 9 holes again this past week with only minor soreness in R shoulder. She notes she has noticed the most limited motion is R shoulder IR compared to movement in all other planes.     20+ weeks postop    OBJECTIVE: See eval       ROM PROM AROM  Comment:  2020    L R L R    Flexion  160° (pulley) 160° 154°    Abduction   168° 151°    ER   T3 T1    IR   T9 T10    Elbow Flex        Elbow Ext              Strength L R Comment:  2020   Flexion  4/5    Abduction  4-/5    ER  4-/5    IR  4/5            Special Tests Results/Comment: deferred secondary to surgery:  2020 strengthening, flexibility, endurance, ROM  for improvements in scapular, scapulothoracic and UE control with self care, reaching, carrying, lifting, house/yardwork, driving/computer work. [x] (39356) Provided verbal/tactile cueing for activities related to improving balance, coordination, kinesthetic sense, posture, motor skill, proprioception  to assist with  scapular, scapulothoracic and UE control with self care, reaching, carrying, lifting, house/yardwork, driving/computer work. Therapeutic Activities:    [x] (49138 or 00582) Provided verbal/tactile cueing for activities related to improving balance, coordination, kinesthetic sense, posture, motor skill, proprioception and motor activation to allow for proper function of scapular, scapulothoracic and UE control with self care, carrying, lifting, driving/computer work.      Home Exercise Program:    [x] (82412) Reviewed/Progressed HEP activities related to strengthening, flexibility, endurance, ROM of scapular, scapulothoracic and UE control with self care, reaching, carrying, lifting, house/yardwork, driving/computer work  [] (65776) Reviewed/Progressed HEP activities related to improving balance, coordination, kinesthetic sense, posture, motor skill, proprioception of scapular, scapulothoracic and UE control with self care, reaching, carrying, lifting, house/yardwork, driving/computer work       Manual Treatments:  PROM / STM / Oscillations-Mobs:  G-I, II, III, IV (PA's, Inf., Post.)  [x] (56517) Provided manual therapy to mobilize soft tissue/joints of cervical/CT, scapular GHJ and UE for the purpose of modulating pain, promoting relaxation,  increasing ROM, reducing/eliminating soft tissue swelling/inflammation/restriction, improving soft tissue extensibility and allowing for proper ROM for normal function with self care, reaching, carrying, lifting, house/yardwork, driving/computer work    Modalities:     [] GAME READY (VASO)- for significant edema, swelling, pain control. [x] CP to R shoulder x10'    Charges:  Timed Code Treatment Minutes: 69   Total Treatment Minutes: 79       [] EVAL (LOW) 27474 (typically 20 minutes face-to-face)  [] EVAL (MOD) 58244 (typically 30 minutes face-to-face)  [] EVAL (HIGH) 41112 (typically 45 minutes face-to-face)  [] RE-EVAL     [x] JY(39447) x  2 (27') [] IONTO  [x] NMR (73843) x 1 (12')    [] VASO  [x] Manual (13707) x1   (10')  [] Other:  [x] TA x 1  (20')  [] Mech Traction (75170)  [] ES(attended) (36305)      [] ES (un) (29511):          ASSESSMENT:   Sleeper stretch introduced today to help address patient complaints of limitations in R shoulder IR with patient requiring moderate verbal and tactile cuing to maintain appropriate form. Patient displays progress in strength with increases in sets for ER and increase in weight for prone retraction and extension. Patient exhibited minimal fatigue throughout PREs with proper form demonstrated. SA punches also introduced today with patient tolerating exercise very well and proper form demonstrated. HEP updated with PT sending silver band home with patient. GOALS:  Patient stated goal: Able to regain ROM and perform all tasks without pain  [x] Progressing: [] Met: [] Not Met: [] Adjusted    Therapist goals for Patient:   Short Term Goals: To be achieved in: 2 weeks  1. Independent in HEP and progression per patient tolerance, in order to prevent re-injury. [] Progressing: [x] Met: [] Not Met: [] Adjusted  2. Patient will have a decrease in pain to facilitate improvement in movement, function, and ADLs as indicated by Functional Deficits. [] Progressing: [x] Met: [] Not Met: [] Adjusted    Long Term Goals: To be achieved in: 16 weeks  1. Disability index score of 15% or less for the UEFI to assist with reaching prior level of function. [x] Progressing: [] Met: [] Not Met: [] Adjusted  2.  Patient will demonstrate increased R shoulder flexion, abduction, ER, and IR AROM to >155°, >155°, T2, and T10 respectively to allow for proper joint functioning as indicated by patients Functional Deficits. [x] Progressing: [] Met: [] Not Met: [] Adjusted  3. Patient will demonstrate an increase in R shoulder strength in all planes  to 4+/5 or greater to allow for proper functional mobility as indicated by patients Functional Deficits. [x] Progressing: [] Met: [] Not Met: [] Adjusted  4. Patient will return to all functional activities without increased symptoms or restriction. [x] Progressing: [] Met: [] Not Met: [] Adjusted  5. Patient will be able to complete all bathing and dressing ADLs without pain or compensation so that she may return to [facility] for all ADLs. [] Progressing: [x] Met: [] Not Met: [] Adjusted        Overall Progression Towards Functional goals/ Treatment Progress Update:  [x] Patient is progressing as expected towards functional goals listed. [] Progression is slowed due to complexities/Impairments listed. [] Progression has been slowed due to co-morbidities.   [] Plan just implemented, too soon to assess goals progression <30days   [] Goals require adjustment due to lack of progress  [] Patient is not progressing as expected and requires additional follow up with physician  [] Other    Prognosis for POC: [x] Good [] Fair  [] Poor      Patient requires continued skilled intervention: [x] Yes  [] No    Treatment/Activity Tolerance:  [x] Patient able to complete treatment  [] Patient limited by fatigue  [] Patient limited by pain    [] Patient limited by other medical complications  [] Other:           PLAN: 1-2x/week for 6 weeks;   [x] Continue per plan of care [] Alter current plan   [] Plan of care initiated [] Hold pending MD visit [] Discharge      Electronically signed by:     Baldwin Crigler, Student Physical Therapist      Therapist was present, directed the patient's care, made skilled judgement, and was responsible for assessment and treatment of the patient. Zach Nj, PT, DPT, OCS, OMT-C        [facility] PT license: [id number]  [place] PT license: [id number]        Note: If patient does not return for scheduled/ recommended follow up visits, this note will serve as a discharge from care along with most recent update on progress.

## 2020-10-09 ENCOUNTER — TELEPHONE (OUTPATIENT)
Dept: ORTHOPEDIC SURGERY | Age: 55
End: 2020-10-09

## 2020-10-12 ENCOUNTER — TELEPHONE (OUTPATIENT)
Dept: ORTHOPEDIC SURGERY | Age: 55
End: 2020-10-12

## 2020-10-15 ENCOUNTER — TREATMENT (OUTPATIENT)
Dept: PHYSICAL THERAPY | Age: 55
End: 2020-10-15
Payer: COMMERCIAL

## 2020-10-15 PROCEDURE — 97110 THERAPEUTIC EXERCISES: CPT | Performed by: PHYSICAL THERAPIST

## 2020-10-15 PROCEDURE — 97112 NEUROMUSCULAR REEDUCATION: CPT | Performed by: PHYSICAL THERAPIST

## 2020-10-15 PROCEDURE — 97140 MANUAL THERAPY 1/> REGIONS: CPT | Performed by: PHYSICAL THERAPIST

## 2020-10-15 PROCEDURE — 97530 THERAPEUTIC ACTIVITIES: CPT | Performed by: PHYSICAL THERAPIST

## 2020-10-15 NOTE — PROGRESS NOTES
Joe Galvan [facility]   Phone: [phone]    Fax: [phone]      Physical Therapy Treatment Note/ Progress Report:           Date:  10/15/2020    Patient Name:  Juarez Hernandez    :  1965  MRN: <[id number]>  Restrictions/Precautions:    Medical/Treatment Diagnosis Information:  · Diagnosis: R shoulder OA s/p TSA  · DOS:   Insurance/Certification information:  PT Insurance Information: [facility]  Physician Information:  Referring Practitioner: Dr. Glenroy Childress  Has the plan of care been signed (Y/N):        [x]  Yes  []  No      Date of Patient follow up with Physician: 10/27/2020      Is this a Progress Report:     []  Yes  [x]  No        If Yes:  Date Range for reporting period:  Beginnin2020   Ending: 10/23/2020    Progress report will be due (10 Rx or 30 days whichever is less):       Recertification will be due (POC Duration  / 90 days whichever is less): 10/23/2020        Visit # Insurance Allowable Auth Required   26 90 (hard max) []  Yes [x]  No        Functional Scale: UEFI: 66/80, 17.5% limitation    Date assessed: 10/7/2020     Latex Allergy:  [x]NO      []YES  Preferred Language for Healthcare:   [x]English       []other:      Pain level:  1-2/10     SUBJECTIVE:  Patient reports that she has been incorporating sleeper stretch at home, and she feels that it is really helping with her IR ROM. She notes that played a full 9 holes of golf yesterday and was able to use her  without pain. She notes that her follow through is improving, and she just feels fatigued at the end of the round, with no lingering pain into the next day.     ~5 months postop    OBJECTIVE: See eval       ROM PROM AROM  Comment:  2020    L R L R    Flexion  160° (pulley) 160° 154°    Abduction   168° 151°    ER   T3 T1    IR   T9 T10    Elbow Flex        Elbow Ext              Strength L R Comment:  2020   Flexion  4/5    Abduction  4-/5    ER  4-/5    IR  4/5 Special Tests Results/Comment: deferred secondary to surgery:  8/24/2020        RESTRICTIONS/PRECAUTIONS: R TSA    Exercises/Interventions:     Therapeutic Ex (73301) Sets/sec Reps Notes/CUES   AD UE BIKE            STRETCHING/ROM      Pulleys 10\" x10 Flex and scap   Table Slides 10\" x10   Wand: seated 10\" x10   Supine wand ER 10\" x10   Supine wand flexion 10\" x10    Flex and scap   F/b, s/s      Doorway 10\" x10    Doorway pec stretch 10\" x10    Wall Slides 10\" x10    Biceps stretch with cane 10\" x10    CBA 10\" x10       BBIR 10\" x5    Sleeper  10\" x10    Elbow AROM 7# x20    Wrist 4 way 7# x20    Upper trap stretch 10\" x10    Levator stretch 10\" x10    Open Books 10\" x10          ISOMETRICS      Gripping     Finger ext: rubber band     Shrugs    Retraction    Abduction 10\" x10    Flexion      Internal Rotation 10\" x10    External Rotation 10\" x10          STRENGTHENING-PREs      Flexion      Abduction      Internal Rotation      External Rotation 4#, 3 x10    Shrugs      Biceps      Triceps      Retraction 5#, 2 x10    Extension 5#, 2 x10    Horizontal Abduction in ER      Serratus 5#, 2 x10                      THERABANDS/CABLE COLUMN      Rows Silver, 2 x10    Lats      Extension Silver, 2 x10    Internal Rotation Silver, 2 x10    External Rotation Black, 2 x10    Biceps      Triceps      PNF                                    Manual Intervention (95207)      Scar Massage          Hawkgrips      GH joint mobilizations      Foamroll      Manual PROM: R shoulder ER and flex 6'     Manual CBA stretch with scap block 4'           NMR re-education (28222)   CUES NEEDED   Plyoback      Therabar oscillations      Body Blade       Rhythmic Stabilization      Ball on the wall 2#, 2x x30' Cc/cw                             Therapeutic Activity (31675)      Core training      Swiss ball activities      Education                              Therapeutic Exercise and NMR EXR  [x] (30829) Provided verbal/tactile (VASO)- for significant edema, swelling, pain control. [x] CP to R shoulder x10'    Charges:  Timed Code Treatment Minutes: 60   Total Treatment Minutes: 70       [] EVAL (LOW) 74761 (typically 20 minutes face-to-face)  [] EVAL (MOD) 80208 (typically 30 minutes face-to-face)  [] EVAL (HIGH) 21994 (typically 45 minutes face-to-face)  [] RE-EVAL     [x] HW(02169) x  1 (18') [] IONTO  [x] NMR (90228) x 1 (12')    [] VASO  [x] Manual (17314) x1   (10')  [] Other:  [x] TA x 1  (20')  [] Mech Traction (34139)  [] ES(attended) (15602)      [] ES (un) (24213):          ASSESSMENT:   Patient continues to demonstrate steady improvements in R shoulder ROM in all planes both actively and passively. Her greatest restrictions remain in R shoulder IR ROM. She requires moderate VCs with PRE exercises to correct minor issues in form. She demonstrates improving activity tolerance overall, requiring decreased need for rest breaks throughout session and reports less fatigue upon completion of session. GOALS:  Patient stated goal: Able to regain ROM and perform all tasks without pain  [x] Progressing: [] Met: [] Not Met: [] Adjusted    Therapist goals for Patient:   Short Term Goals: To be achieved in: 2 weeks  1. Independent in HEP and progression per patient tolerance, in order to prevent re-injury. [] Progressing: [x] Met: [] Not Met: [] Adjusted  2. Patient will have a decrease in pain to facilitate improvement in movement, function, and ADLs as indicated by Functional Deficits. [] Progressing: [x] Met: [] Not Met: [] Adjusted    Long Term Goals: To be achieved in: 16 weeks  1. Disability index score of 15% or less for the UEFI to assist with reaching prior level of function. [x] Progressing: [] Met: [] Not Met: [] Adjusted  2.  Patient will demonstrate increased R shoulder flexion, abduction, ER, and IR AROM to >155°, >155°, T2, and T10 respectively to allow for proper joint functioning as indicated by patients Functional Deficits. [x] Progressing: [] Met: [] Not Met: [] Adjusted  3. Patient will demonstrate an increase in R shoulder strength in all planes  to 4+/5 or greater to allow for proper functional mobility as indicated by patients Functional Deficits. [x] Progressing: [] Met: [] Not Met: [] Adjusted  4. Patient will return to all functional activities without increased symptoms or restriction. [x] Progressing: [] Met: [] Not Met: [] Adjusted  5. Patient will be able to complete all bathing and dressing ADLs without pain or compensation so that she may return to [facility] for all ADLs. [] Progressing: [x] Met: [] Not Met: [] Adjusted        Overall Progression Towards Functional goals/ Treatment Progress Update:  [x] Patient is progressing as expected towards functional goals listed. [] Progression is slowed due to complexities/Impairments listed. [] Progression has been slowed due to co-morbidities. [] Plan just implemented, too soon to assess goals progression <30days   [] Goals require adjustment due to lack of progress  [] Patient is not progressing as expected and requires additional follow up with physician  [] Other    Prognosis for POC: [x] Good [] Fair  [] Poor      Patient requires continued skilled intervention: [x] Yes  [] No    Treatment/Activity Tolerance:  [x] Patient able to complete treatment  [] Patient limited by fatigue  [] Patient limited by pain    [] Patient limited by other medical complications  [] Other:           PLAN: 1-2x/week for 6 weeks;   [x] Continue per plan of care [] Alter current plan   [] Plan of care initiated [] Hold pending MD visit [] Discharge      Electronically signed by:           Osvaldo Cardenas, PT, DPT, OCS, OMT-C        [id number] [facility] [employer] [address] PT license: [id number]  [place] PT license: [id number]        Note: If patient does not return for scheduled/ recommended follow up visits, this note will serve as a discharge from care along with most recent update on progress.

## 2020-10-20 ENCOUNTER — TREATMENT (OUTPATIENT)
Dept: PHYSICAL THERAPY | Age: 55
End: 2020-10-20
Payer: COMMERCIAL

## 2020-10-20 PROCEDURE — 97140 MANUAL THERAPY 1/> REGIONS: CPT | Performed by: PHYSICAL THERAPIST

## 2020-10-20 PROCEDURE — 97530 THERAPEUTIC ACTIVITIES: CPT | Performed by: PHYSICAL THERAPIST

## 2020-10-20 PROCEDURE — 97112 NEUROMUSCULAR REEDUCATION: CPT | Performed by: PHYSICAL THERAPIST

## 2020-10-20 PROCEDURE — 97110 THERAPEUTIC EXERCISES: CPT | Performed by: PHYSICAL THERAPIST

## 2020-10-20 NOTE — PROGRESS NOTES
[facility]   Phone: [phone]    Fax: [phone]      Physical Therapy Treatment Note/ Progress Report:           Date:  10/20/2020    Patient Name:  Justino Goddard    :  1965  MRN: <[id number]>  Restrictions/Precautions:    Medical/Treatment Diagnosis Information:  · Diagnosis: R shoulder OA s/p TSA  · DOS:   Insurance/Certification information:  PT Insurance Information: BCBS  Physician Information:  Referring Practitioner: Dr. Jennie Robin  Has the plan of care been signed (Y/N):        [x]  Yes  []  No      Date of Patient follow up with Physician: 10/27/2020      Is this a Progress Report:     []  Yes  [x]  No        If Yes:  Date Range for reporting period:  Beginnin2020   Ending: 10/23/2020    Progress report will be due (10 Rx or 30 days whichever is less): 7941      Recertification will be due (POC Duration  / 90 days whichever is less): 10/23/2020        Visit # Insurance Allowable Auth Required    (hard max) []  Yes [x]  No        Functional Scale: UEFI: 66/80, 17.5% limitation    Date assessed: 10/7/2020     Latex Allergy:  [x]NO      []YES  Preferred Language for Healthcare:   [x]English       []other:      Pain level:  1-2/10     SUBJECTIVE:  Patient reports R shoulder is doing very well. She notes that the sleeper stretch is still her most challenging activity.     ~5 months postop    OBJECTIVE: See eval       ROM PROM AROM  Comment:  2020    L R L R    Flexion  160° (pulley) 160° 154°    Abduction   168° 151°    ER   T3 T1    IR   T9 T10    Elbow Flex        Elbow Ext              Strength L R Comment:  2020   Flexion  4/5    Abduction  4-/5    ER  4-/5    IR  4/5            Special Tests Results/Comment: deferred secondary to surgery:  2020        RESTRICTIONS/PRECAUTIONS: R TSA    Exercises/Interventions:     Therapeutic Ex (91644) Sets/sec Reps Notes/CUES   AD UE BIKE            STRETCHING/ROM      Pulleys 10\" x10 Flex and scap   Table Slides 10\" x10   Wand: seated 10\" x10   Supine wand ER 10\" x10   Supine wand flexion 10\" x10    Flex and scap   F/b, s/s      Doorway 10\" x10    Doorway pec stretch 10\" x10    Wall Slides 10\" x10    Biceps stretch with cane 10\" x10    CBA 10\" x10       BBIR 10\" x5    Sleeper  10\" x10    Elbow AROM 7# x20    Wrist 4 way 7# x20    Upper trap stretch 10\" x10    Levator stretch 10\" x10    Open Books 10\" x10          ISOMETRICS      Gripping     Finger ext: rubber band     Shrugs    Retraction    Abduction 10\" x10    Flexion      Internal Rotation 10\" x10    External Rotation 10\" x10          STRENGTHENING-PREs      Flexion      Abduction      Internal Rotation      External Rotation 4#, 3 x10    Shrugs      Biceps      Triceps      Retraction 5#, 2 x10    Extension 5#, 2 x10    Horizontal Abduction in ER      Serratus 5#, 2 x10                      THERABANDS/CABLE COLUMN      Rows Silver, 2 x10    Lats      Extension Silver, 2 x10    Internal Rotation Silver, 2 x10    External Rotation Silver, 2 x10    Biceps      Triceps      PNF                                    Manual Intervention (17055)      Scar Massage          Hawkgrips      GH joint mobilizations      Foamroll      Manual PROM: R shoulder ER and flex 6'     Manual CBA stretch with scap block 4'           NMR re-education (96660)   CUES NEEDED   Plyoback      Therabar oscillations      Body Blade       Rhythmic Stabilization      Ball on the wall 2#, 2x x30' Cc/cw                             Therapeutic Activity (08249)      Core training      Swiss ball activities      Education                              Therapeutic Exercise and NMR EXR  [x] (48811) Provided verbal/tactile cueing for activities related to strengthening, flexibility, endurance, ROM  for improvements in scapular, scapulothoracic and UE control with self care, reaching, carrying, lifting, house/yardwork, driving/computer work.     [x] (45877) Provided verbal/tactile cueing for activities related to improving balance, coordination, kinesthetic sense, posture, motor skill, proprioception  to assist with  scapular, scapulothoracic and UE control with self care, reaching, carrying, lifting, house/yardwork, driving/computer work. Therapeutic Activities:    [x] (83074 or 52704) Provided verbal/tactile cueing for activities related to improving balance, coordination, kinesthetic sense, posture, motor skill, proprioception and motor activation to allow for proper function of scapular, scapulothoracic and UE control with self care, carrying, lifting, driving/computer work. Home Exercise Program:    [x] (16664) Reviewed/Progressed HEP activities related to strengthening, flexibility, endurance, ROM of scapular, scapulothoracic and UE control with self care, reaching, carrying, lifting, house/yardwork, driving/computer work  [] (45541) Reviewed/Progressed HEP activities related to improving balance, coordination, kinesthetic sense, posture, motor skill, proprioception of scapular, scapulothoracic and UE control with self care, reaching, carrying, lifting, house/yardwork, driving/computer work       Manual Treatments:  PROM / STM / Oscillations-Mobs:  G-I, II, III, IV (PA's, Inf., Post.)  [x] (68394) Provided manual therapy to mobilize soft tissue/joints of cervical/CT, scapular GHJ and UE for the purpose of modulating pain, promoting relaxation,  increasing ROM, reducing/eliminating soft tissue swelling/inflammation/restriction, improving soft tissue extensibility and allowing for proper ROM for normal function with self care, reaching, carrying, lifting, house/yardwork, driving/computer work    Modalities:     [] GAME READY (VASO)- for significant edema, swelling, pain control.   [x] CP to R shoulder x10'    Charges:  Timed Code Treatment Minutes: 57   Total Treatment Minutes: 77       [] EVAL (LOW) 23580 (typically 20 minutes face-to-face)  [] EVAL (MOD) 34583 (typically 30 minutes face-to-face)  [] EVAL (HIGH) 01000 (typically 45 minutes face-to-face)  [] RE-EVAL     [x] AQ(09386) x  1 (15') [] IONTO  [x] NMR (74569) x 1 (12')    [] VASO  [x] Manual (41960) x1   (10')  [] Other:  [x] TA x 1  (20')  [] Mech Traction (36447)  [] ES(attended) (07871)      [] ES (un) (47116):          ASSESSMENT:   Patient demonstrates moderate fatigue with PRE activities. She continues to demonstrate improvements in R shoulder strength with increased resistance for TB ER today. Patient is able to complete program with minimal rest breaks and cuing from PT. PT and patient discussed that we will perform reassessment NPV and plan for possible discharge to independent HEP based on patient progress toward all LTGs and based on MD recommendations at follow up appointment next week. GOALS:  Patient stated goal: Able to regain ROM and perform all tasks without pain  [x] Progressing: [] Met: [] Not Met: [] Adjusted    Therapist goals for Patient:   Short Term Goals: To be achieved in: 2 weeks  1. Independent in HEP and progression per patient tolerance, in order to prevent re-injury. [] Progressing: [x] Met: [] Not Met: [] Adjusted  2. Patient will have a decrease in pain to facilitate improvement in movement, function, and ADLs as indicated by Functional Deficits. [] Progressing: [x] Met: [] Not Met: [] Adjusted    Long Term Goals: To be achieved in: 16 weeks  1. Disability index score of 15% or less for the UEFI to assist with reaching prior level of function. [x] Progressing: [] Met: [] Not Met: [] Adjusted  2. Patient will demonstrate increased R shoulder flexion, abduction, ER, and IR AROM to >155°, >155°, T2, and T10 respectively to allow for proper joint functioning as indicated by patients Functional Deficits. [x] Progressing: [] Met: [] Not Met: [] Adjusted  3.  Patient will demonstrate an increase in R shoulder strength in all planes  to 4+/5 or greater to allow for proper functional mobility as indicated by patients Functional Deficits. [x] Progressing: [] Met: [] Not Met: [] Adjusted  4. Patient will return to all functional activities without increased symptoms or restriction. [x] Progressing: [] Met: [] Not Met: [] Adjusted  5. Patient will be able to complete all bathing and dressing ADLs without pain or compensation so that she may return to PLOF for all ADLs. [] Progressing: [x] Met: [] Not Met: [] Adjusted        Overall Progression Towards Functional goals/ Treatment Progress Update:  [x] Patient is progressing as expected towards functional goals listed. [] Progression is slowed due to complexities/Impairments listed. [] Progression has been slowed due to co-morbidities. [] Plan just implemented, too soon to assess goals progression <30days   [] Goals require adjustment due to lack of progress  [] Patient is not progressing as expected and requires additional follow up with physician  [] Other    Prognosis for POC: [x] Good [] Fair  [] Poor      Patient requires continued skilled intervention: [x] Yes  [] No    Treatment/Activity Tolerance:  [x] Patient able to complete treatment  [] Patient limited by fatigue  [] Patient limited by pain    [] Patient limited by other medical complications  [] Other:           PLAN: 1-2x/week for 6 weeks;   [x] Continue per plan of care [] Alter current plan   [] Plan of care initiated [] Hold pending MD visit [] Discharge      Electronically signed by:           Danay Galindo, PT, DPT, OCS, OMT-C        [id number] [facility] [employer] PT license: [id number]  [place] PT license: [id number]        Note: If patient does not return for scheduled/ recommended follow up visits, this note will serve as a discharge from care along with most recent update on progress.

## 2020-10-27 ENCOUNTER — OFFICE VISIT (OUTPATIENT)
Dept: ORTHOPEDIC SURGERY | Age: 55
End: 2020-10-27
Payer: COMMERCIAL

## 2020-10-27 ENCOUNTER — TREATMENT (OUTPATIENT)
Dept: PHYSICAL THERAPY | Age: 55
End: 2020-10-27
Payer: COMMERCIAL

## 2020-10-27 VITALS — HEIGHT: 67 IN | BODY MASS INDEX: 28.09 KG/M2 | WEIGHT: 179 LBS

## 2020-10-27 PROCEDURE — 97110 THERAPEUTIC EXERCISES: CPT | Performed by: PHYSICAL THERAPIST

## 2020-10-27 PROCEDURE — 97530 THERAPEUTIC ACTIVITIES: CPT | Performed by: PHYSICAL THERAPIST

## 2020-10-27 PROCEDURE — 99213 OFFICE O/P EST LOW 20 MIN: CPT | Performed by: ORTHOPAEDIC SURGERY

## 2020-10-27 PROCEDURE — 97140 MANUAL THERAPY 1/> REGIONS: CPT | Performed by: PHYSICAL THERAPIST

## 2020-10-27 PROCEDURE — 97112 NEUROMUSCULAR REEDUCATION: CPT | Performed by: PHYSICAL THERAPIST

## 2020-10-27 NOTE — PROGRESS NOTES
Joe Parmar [facility]   Phone: [phone]    Fax: [phone]   Physical Therapy Discharge Summary    Dear Dr. Shae San,    We had the pleasure of treating the following patient for physical therapy services at [address] [place] [address]. A summary of our findings can be found in the discharge summary below. If you have any questions or concerns regarding these findings, please do not hesitate to contact me at the office phone number checked above.   Thank you for the referral.     Physician Signature:________________________________Date:__________________  By signing above (or electronic signature), therapists plan is approved by physician      Overall Response to Treatment:   [x]Patient is responding well to treatment and improvement is noted with regards  to goals   [x]Patient should continue to improve in reasonable time if they continue HEP   []Patient has plateaued and is no longer responding to skilled PT intervention    []Patient is getting worse and would benefit from return to referring MD   []Patient unable to adhere to initial POC   []Other:     Date range of Visits: 2020-10/27/2020    Total Visits: 28          Physical Therapy Treatment Note/ Progress Report:           Date:  10/27/2020    Patient Name:  David Buckley    :  1965  MRN: <[id number]>  Restrictions/Precautions:    Medical/Treatment Diagnosis Information:  · Diagnosis: R shoulder OA s/p TSA  · DOS:   Insurance/Certification information:  PT Insurance Information: BCBS  Physician Information:  Referring Practitioner: Dr. Shae San  Has the plan of care been signed (Y/N):        []  Yes  [x]  No      Date of Patient follow up with Physician: 10/27/2020      Is this a Progress Report:     [x]  Yes  []  No        If Yes:  Date Range for reporting period:  Beginnin2020   Ending: 10/27/2020    Progress report will be due (10 Rx or 30 days whichever is less): DISCHARGE      Recertification will be due (POC Duration  / 90 days whichever is less):  DISCHARGE        Visit # Insurance Allowable Auth Required   28 90 (hard max) []  Yes [x]  No        Functional Scale: UEFI: 76/80, 5% limitation    Date assessed: 10/27/2020     Latex Allergy:  [x]NO      []YES  Preferred Language for Healthcare:   [x]English       []other:      Pain level:  0-1/10     SUBJECTIVE:  Patient reports that R shoulder is feeling very good. She notes that she has minimal limitations with strength, but overall is able to do all of her ADLs and most recreational activities without issue.     ~5 months postop    OBJECTIVE: See eval       ROM PROM AROM  Comment:  10/27/2020    L R L R    Flexion  160° (pulley) 160° 163°    Abduction   168° 165°    ER   T3 T2    IR   T9 T9    Elbow Flex        Elbow Ext              Strength L R Comment:  10/27/2020   Flexion  4+/5    Abduction  4+/5    ER  4+/5    IR  5/5            Special Tests Results/Comment: deferred secondary to surgery:  10/27/2020        RESTRICTIONS/PRECAUTIONS: R TSA    Exercises/Interventions:     Therapeutic Ex (46219) Sets/sec Reps Notes/CUES   AD UE BIKE            STRETCHING/ROM      Pulleys 10\" x10 Flex and scap   Table Slides 10\" x10   Wand: seated 10\" x10   Supine wand ER 10\" x10   Supine wand flexion 10\" x10    Flex and scap   F/b, s/s      Doorway 10\" x10    Doorway pec stretch 10\" x10    Wall Slides 10\" x10    Biceps stretch with cane 10\" x10    CBA 10\" x10       BBIR 10\" x5    Sleeper  10\" x10    Elbow AROM 8# x20    Wrist 4 way 8# x20    Upper trap stretch 10\" x10    Levator stretch 10\" x10    Open Books 10\" x10          ISOMETRICS      Gripping     Finger ext: rubber band     Shrugs    Retraction    Abduction 10\" x10    Flexion      Internal Rotation 10\" x10    External Rotation 10\" x10          STRENGTHENING-PREs      Flexion      Abduction      Internal Rotation      External Rotation 4#, 3 x10    Shrugs      Biceps      Triceps scapulothoracic and UE control with self care, reaching, carrying, lifting, house/yardwork, driving/computer work  [] (78616) Reviewed/Progressed HEP activities related to improving balance, coordination, kinesthetic sense, posture, motor skill, proprioception of scapular, scapulothoracic and UE control with self care, reaching, carrying, lifting, house/yardwork, driving/computer work       Manual Treatments:  PROM / STM / Oscillations-Mobs:  G-I, II, III, IV (PA's, Inf., Post.)  [x] (43732) Provided manual therapy to mobilize soft tissue/joints of cervical/CT, scapular GHJ and UE for the purpose of modulating pain, promoting relaxation,  increasing ROM, reducing/eliminating soft tissue swelling/inflammation/restriction, improving soft tissue extensibility and allowing for proper ROM for normal function with self care, reaching, carrying, lifting, house/yardwork, driving/computer work    Modalities:     [] GAME READY (VASO)- for significant edema, swelling, pain control. [x] CP to R shoulder x10'    Charges:  Timed Code Treatment Minutes: 55   Total Treatment Minutes: 65       [] EVAL (LOW) 26967 (typically 20 minutes face-to-face)  [] EVAL (MOD) 09852 (typically 30 minutes face-to-face)  [] EVAL (HIGH) 58495 (typically 45 minutes face-to-face)  [] RE-EVAL     [x] TZ(66347) x  1 (15') [] IONTO  [x] NMR (09275) x 1 (12')    [] VASO  [x] Manual (80572) x1   (10')  [] Other:  [x] TA x 1  (18')  [] [facility] T[facility] (19626)  [] ES(attended) (74385)      [] ES (un) (90305):          ASSESSMENT:  Patient continues to demonstrate good progress toward all LTGs previously set by PT. She has minimal limitations in AROM and strength in R shoulder compared to the L shoulder. She has returned to all functional and most recreational activities without limitation. PT reviewed importance of continuing with HEP for ROM and functional strengthening, and patient demonstrates full independence with her HEP.  Patient was instructed to contact PT with any questions or concerns that she may have. Patient is being discharged from skilled PT services to independent [facility], pending final appointment with Dr. Ynes Mesa immediately following today's appointment. GOALS:  Patient stated goal: Able to regain ROM and perform all tasks without pain  [] Progressing: [x] Met: [] Not Met: [] Adjusted    Therapist goals for Patient:   Short Term Goals: To be achieved in: 2 weeks  1. Independent in HEP and progression per patient tolerance, in order to prevent re-injury. [] Progressing: [x] Met: [] Not Met: [] Adjusted  2. Patient will have a decrease in pain to facilitate improvement in movement, function, and ADLs as indicated by Functional Deficits. [] Progressing: [x] Met: [] Not Met: [] Adjusted    Long Term Goals: To be achieved in: 16 weeks  1. Disability index score of 15% or less for the UEFI to assist with reaching prior level of function. [] Progressing: [x] Met: [] Not Met: [] Adjusted  2. Patient will demonstrate increased R shoulder flexion, abduction, ER, and IR AROM to >155°, >155°, T2, and T10 respectively to allow for proper joint functioning as indicated by patients Functional Deficits. [] Progressing: [x] Met: [] Not Met: [] Adjusted  3. Patient will demonstrate an increase in R shoulder strength in all planes  to 4+/5 or greater to allow for proper functional mobility as indicated by patients Functional Deficits. [] Progressing: [x] Met: [] Not Met: [] Adjusted  4. Patient will return to all functional activities without increased symptoms or restriction. [] Progressing: [x] Met: [] Not Met: [] Adjusted  5. Patient will be able to complete all bathing and dressing ADLs without pain or compensation so that she may return to PLOF for all ADLs.   [] Progressing: [x] Met: [] Not Met: [] Adjusted        Overall Progression Towards Functional goals/ Treatment Progress Update:  [x] Patient is progressing as expected towards functional goals listed. [] Progression is slowed due to complexities/Impairments listed. [] Progression has been slowed due to co-morbidities. [] Plan just implemented, too soon to assess goals progression <30days   [] Goals require adjustment due to lack of progress  [] Patient is not progressing as expected and requires additional follow up with physician  [] Other    Prognosis for POC: [x] Good [] Fair  [] Poor      Patient requires continued skilled intervention: [] Yes  [x] No    Treatment/Activity Tolerance:  [x] Patient able to complete treatment  [] Patient limited by fatigue  [] Patient limited by pain    [] Patient limited by other medical complications  [] Other:           PLAN:   [] Continue per plan of care [] Alter current plan   [] Plan of care initiated [] Hold pending MD visit [x] Discharge      Electronically signed by:           Kerwin Hernandez, PT, DPT, OCS, OMT-C        [id number] [address] PT license: [id number]  [place] PT license: [id number]        Note: If patient does not return for scheduled/ recommended follow up visits, this note will serve as a discharge from care along with most recent update on progress.

## 2020-10-27 NOTE — PROGRESS NOTES
[facility]  Office Visit  Follow up  Date:  10/27/2020    Name:  Nick Vargas  Address:  [place] [address] [address] [place]    :  1965      Age:   62 y.o.    SSN:  [id number]      Medical Record Number:  <[id number]>    Chief Complaint:    Follow up for right shoulder    HPI:   Nick Vargas is a 62 y.o. female who is following up on her right shoulder. She underwent right total shoulder arthroplasty in May 2020. She is doing very well and she states that she has been back to golf now this time. She is now done with her physical therapy. Pain Assessment  Location of Pain: Shoulder  Location Modifiers: Right  Severity of Pain: 0  Quality of Pain: Aching  Frequency of Pain: Rarely  Aggravating Factors: Exercise  Limiting Behavior: No  Relieving Factors: Rest  Result of Injury: No  Work-Related Injury: No  Are there other pain locations you wish to document?: No    Past History:  Past Medical History:   Diagnosis Date    Cancer ([facility] [address] 2009    MELANOMA INSITU ON BACK    Osteoarthritis     PKU (phenylketonuria) ([facility] [address]        Past Surgical History:   Procedure Laterality Date    JOINT REPLACEMENT Left     partial knee    KNEE SURGERY      SHOULDER ARTHROPLASTY Right 2020    RIGHT SHOULDER ARTHROTOMY, REMOVAL OF LOOSE BODIES, OPEN BICEPS TENODESIS, ANATOMIC TOTAL SHOULDER ARTHROPLASTY performed by Elyse Saucedo MD at [address] [place] [address]      1/ THYROID FOR CYST REMOVED       Social History     Tobacco Use    Smoking status: Never Smoker    Smokeless tobacco: Never Used   Substance Use Topics    Alcohol use: Yes     Comment: X 1 / MONTH    Drug use: No        Family History:  family history includes Cancer in her father.          Current Outpatient Medications:     famotidine (PEPCID) 20 MG tablet, Take 20 mg by mouth daily, Disp: , Rfl:     Pegvaliase-pqpz 20 MG/ML SOSY, Inject 20 mg into the skin See Admin Instructions TAKES 6 DAYS / WEEK (TAKES EVERY DAY EXCEPT TUESDAYS), Disp: , Rfl:     cetirizine (ZYRTEC) 10 MG tablet, Take 10 mg by mouth, Disp: , Rfl:     EPINEPHrine (EPIPEN) 0.3 MG/0.3ML SOAJ injection, Inject 0.3 mg into the muscle, Disp: , Rfl:       No Known Allergies      Review of Systems: A 14 point review of systems available in the scanned medical record as documented by the patient on 2/25/2020. Today's review pertinent items are noted in HPI. Review of Systems    No changes since last updated    Physical Exam:  Ht 5' 7\" (1.702 m)   Wt 179 lb (81.2 kg)   BMI 28.04 kg/m²      5 free items - Need 1 point for level 3 vs 7 more for level 4    General: No acute distress, well nourished  CV: No obvious peripheral edema. Normal peripheral pulses  Neuro: Alert & oriented x 3  Psych: Normal mood and affect    Right shoulder Exam  Incision is well-healed there is no signs of infection. She is 165 degrees of forward elevation, external rotation 80 degrees, internal rotation to T10.  4+/5 strength supraspinatus, infraspinatus, 5/5 strength subscapularis. No instability, nervous intact distally. Left shoulder: Forward elevation 170 degrees, external rotation 85 degrees, internal rotation to T8. No instability, 5/5 rotator cuff strength. Radiographic:  No radiographs obtained today. Assessment:  Marilynn Lund is a 62 y.o. female with:  1. Right shoulder glenohumeral arthritis status post anatomic total shoulder arthroplasty May 2020. Impression:  Encounter Diagnoses   Name Primary?  Status post total replacement of right shoulder Yes    History of right shoulder replacement        Office Procedures:  No orders of the defined types were placed in this encounter. Plan:   Overall patient is doing very well. She can continue to increase her activities as tolerated. We will see her back in 7 months at her 1 year visit with repeat radiographs.  She is in agreement with this

## 2020-12-09 ENCOUNTER — OFFICE VISIT (OUTPATIENT)
Dept: ORTHOPEDIC SURGERY | Age: 55
End: 2020-12-09
Payer: COMMERCIAL

## 2020-12-09 VITALS — BODY MASS INDEX: 28.25 KG/M2 | HEIGHT: 67 IN | WEIGHT: 180 LBS

## 2020-12-09 PROCEDURE — L3040 FT ARCH SUPRT PREMOLD LONGIT: HCPCS | Performed by: ORTHOPAEDIC SURGERY

## 2020-12-09 PROCEDURE — 99243 OFF/OP CNSLTJ NEW/EST LOW 30: CPT | Performed by: ORTHOPAEDIC SURGERY

## 2020-12-09 RX ORDER — MELOXICAM 15 MG/1
15 TABLET ORAL DAILY
Qty: 30 TABLET | Refills: 1 | Status: SHIPPED | OUTPATIENT
Start: 2020-12-09 | End: 2021-05-27

## 2020-12-09 NOTE — PROGRESS NOTES
Chief Complaint    No chief complaint on file. History of Present Illness:  Mayco Mera is a 60 y.o. female who I was asked to see in consultation by Dr. Jadiel Sheth for evaluation of they are chief complaint of right midfoot pain. She states this started about a year ago without history of trauma. Her pain is to her right midfoot especially along the first metatarsal and MTP joint. It was a slow onset. Rates her pain at 4 out of 10. Its intermittent and she has complaints of instability and weakness. Walking especially pushing off makes her right foot worse. Resting it makes it better. She has had a left shoulder replacement and had previous right ankle injury when she was in high school and early college. She is a retired teacher    Medical History:  Patient's medications, allergies, past medical, surgical, social and family histories were reviewed and updated as appropriate. Review of Systems:  Pertinent items are noted in HPI  Review of systems reviewed from Patient History Form dated on 12/9/2020 and available in the patient's chart under the Media tab. Vital Signs: There were no vitals taken for this visit. General Exam:   Constitutional: Patient is adequately groomed with no evidence of malnutrition  DTRs: Deep tendon reflexes are intact  Mental Status: The patient is oriented to time, place and person. The patient's mood and affect are appropriate. Lymphatic: The lymphatic examination bilaterally reveals all areas to be without enlargement or induration. Ankle Examination:    Inspection: Minimal swelling through the right midfoot    Palpation: Tenderness over the first second TMT joints    Range of Motion: Tight gastrocs    Strength: Within normal limits    Special Tests: Pain with mobilization through the right midfoot    Skin: There are no rashes, ulcerations or lesions.     Gait: Antalgic    Reflex 2+ and symmetric    Additional Comments:       Additional Examinations: Left Lower Extremity: Examination of the left lower extremity does not show any tenderness, deformity or injury. Range of motion is unremarkable. There is no gross instability. There are no rashes, ulcerations or lesions. Strength and tone are normal.     Radiology:     X-rays obtained and reviewed in office:  Views 3  Location right foot  Impression shows right midfoot arthritis at the first second TMT joint    Assessment : Right midfoot arthritis first second TMT joint      Office Procedures:  No orders of the defined types were placed in this encounter. Treatment Plan:  The etiology of right midfoot arthritis was discussed in great detail including the nonoperative and operative options. All questions were answered. Nonoperative option includes activity modification, immobilization with cast or boot, support using brace shoes and inserts, medicines when appropriate, physical therapy, injections when indicated, and topicals. Operative option includes midfoot fusion. The patient will start on the following treatment regimen: I wrote out her plan of care and copied it to the media section of her chart. I gave her power step inserts a prescription for meloxicam which she will take as needed and we discussed the side effects which with me in 6 weeks as needed     I have evaluated the patient myself and completed the examination of the patient on date of visit.  Have discussed the case and reviewed all pertinent data with the patient

## 2021-01-21 ENCOUNTER — HOSPITAL ENCOUNTER (OUTPATIENT)
Dept: WOMENS IMAGING | Age: 56
Discharge: HOME | End: 2021-01-21
Payer: COMMERCIAL

## 2021-01-21 DIAGNOSIS — Z12.31 ENCOUNTER FOR SCREENING MAMMOGRAM FOR BREAST CANCER: ICD-10-CM

## 2021-01-21 PROCEDURE — 77067 SCR MAMMO BI INCL CAD: CPT

## 2021-05-27 ENCOUNTER — OFFICE VISIT (OUTPATIENT)
Dept: ORTHOPEDIC SURGERY | Age: 56
End: 2021-05-27
Payer: COMMERCIAL

## 2021-05-27 VITALS — WEIGHT: 180 LBS | HEIGHT: 67 IN | BODY MASS INDEX: 28.25 KG/M2

## 2021-05-27 DIAGNOSIS — G89.29 CHRONIC RIGHT SHOULDER PAIN: Primary | ICD-10-CM

## 2021-05-27 DIAGNOSIS — M25.511 CHRONIC RIGHT SHOULDER PAIN: Primary | ICD-10-CM

## 2021-05-27 DIAGNOSIS — Z96.611 STATUS POST TOTAL REPLACEMENT OF RIGHT SHOULDER: ICD-10-CM

## 2021-05-27 PROCEDURE — 99213 OFFICE O/P EST LOW 20 MIN: CPT | Performed by: ORTHOPAEDIC SURGERY

## 2021-05-27 RX ORDER — EPINEPHRINE 0.3 MG/.3ML
0.3 INJECTION SUBCUTANEOUS
COMMUNITY
Start: 2020-11-12 | End: 2022-01-27

## 2021-05-27 NOTE — PROGRESS NOTES
Chief Complaint    Shoulder Pain (F/U RIGHT SHOULDER)      History of Present Illness:  Yessy Izquierdo is a pleasant, 60 y.o., female, here today for a one year follow up of right anatomic total shoulder. She states this surgery has exceeded her expectations. She reports no new injuries or setbacks. Pain Assessment  Location of Pain: Shoulder  Location Modifiers: Right  Severity of Pain: 0  Limiting Behavior: No  Result of Injury: No  Work-Related Injury: No  Are there other pain locations you wish to document?: No      Medical History:  Patient's medications, allergies, past medical, surgical, social and family histories were reviewed and updated as appropriate. Patient has had no medical changes since last evaluated        Review of Systems  A 14 point review of systems was completed by the patient and is available in the media section of the scanned medical record and was reviewed on 5/27/2021. The review is negative with the exception of those things mentioned in the HPI and Past Medical History    Vital Signs: There were no vitals filed for this visit. General/Appearance: Alert and oriented and in no apparent distress. Skin:  There are no skin lesions, cellulitis, or extreme edema. The patient has warm and well-perfused Bilateral upper extremities with brisk capillary refill. Right Shoulder Exam:  Inspection: No gross deformities, no signs of infection. Palpation: Tenderness not appreciated    Active Range of Motion: Forward Elevation 170, Abduction 170, External Rotation 60, Internal Rotation T8 vs T6 on the left. Strength:  External Rotation 5/5, Internal Rotation 5/5, Champagne Toast 5/5, Supraspinatus 5/5    Special Tests: No Caden muscle deformity. Neurovascular: Sensation to light touch is intact, no motor deficits, palpable radial pulses 2+    Self assessment questionnaires were completed today.       Radiology:     Plain radiographs of the right shoulder comprising 2 views: AP and axillary lateral were obtained and reviewed in the office: Shows postsurgical changes from the right total shoulder replacement. All the components are in good placement without any signs of loosening, fractures, subluxations or dislocations.     Impression: Stable postop x-ray. Assessment :  Ms. Shyann Sanchez is a pleasant, 60 y.o. patient who is continuing to do very well following her right anatomic total shoulder. Impression:  Encounter Diagnoses   Name Primary?  Chronic right shoulder pain Yes    Status post total replacement of right shoulder        Office Procedures:  Orders Placed This Encounter   Procedures    XR SHOULDER RIGHT (MIN 2 VIEWS)     Standing Status:   Future     Number of Occurrences:   1     Standing Expiration Date:   5/26/2022       Treatment Plan: At this time, she is doing very well at the one year interval following her anatomic total shoulder. We will see her annually for the next few year unless a new problem arises, at which time we will acquire new x-rays to evaluate the prosthesis positioning. She was told to call us with any question or concern with her shoulder. Gagandeep Gimenez, meghan scribing for and in the presence of Dr. Kg Mariano. The history taking and physical examination were performed by Dr. Eduard Matos. All counseling during the appointment was performed between the patient and Dr. Eduard Matos. 05/27/21  11:01 AM  _________________  I was physically present and personally supervised the Orthopaedic Sports Medicine Fellow in the evaluation and development of a treatment plan for this patient. I personally interviewed the patient and performed a physical examination. In addition, I discussed the patient's condition and treatment options with them. I have also reviewed and agree with the past medical, family and social history unless otherwise noted. All of the patient's questions were answered. Jose F Matos MD, PhD 5/27/2021

## 2021-06-19 ENCOUNTER — OFFICE VISIT (OUTPATIENT)
Dept: ORTHOPEDIC SURGERY | Age: 56
End: 2021-06-19
Payer: COMMERCIAL

## 2021-06-19 VITALS — BODY MASS INDEX: 28.25 KG/M2 | HEIGHT: 67 IN | WEIGHT: 180 LBS | RESPIRATION RATE: 15 BRPM

## 2021-06-19 DIAGNOSIS — M17.11 PRIMARY OSTEOARTHRITIS OF RIGHT KNEE: ICD-10-CM

## 2021-06-19 DIAGNOSIS — M25.561 RIGHT KNEE PAIN, UNSPECIFIED CHRONICITY: Primary | ICD-10-CM

## 2021-06-19 PROCEDURE — 99212 OFFICE O/P EST SF 10 MIN: CPT | Performed by: NURSE PRACTITIONER

## 2021-06-19 RX ORDER — MELOXICAM 7.5 MG/1
7.5 TABLET ORAL DAILY
Qty: 30 TABLET | Refills: 3 | Status: SHIPPED | OUTPATIENT
Start: 2021-06-19 | End: 2022-01-27

## 2021-06-19 NOTE — PROGRESS NOTES
Subjective    Patient ID: Gatito Felton is a 60 y.o..  female. Chief Complaint   Patient presents with    Knee Pain     Right       Pain Assessment  Location of Pain: Knee  Location Modifiers: Right, Posterior, Anterior, Superior  Severity of Pain: 4  Quality of Pain: Dull  Duration of Pain: A few days  Frequency of Pain: Constant  Date Pain First Started: 06/18/21  Aggravating Factors: Exercise  Limiting Behavior: Yes  Result of Injury: Yes  Work-Related Injury: No  Are there other pain locations you wish to document?: No    Knee Pain  Patient complains of right knee pain. Patient reports that yesterday after playing beatlab ball, she had stiffness, soreness and swelling in her knee. There was no mechanism of injury. She points to the suprapatellar, patellar and posterior region of her knee as a source of her pain. She has been using ice and ibuprofen with no relief. She does report instability at times. She is currently retired but is very active. She notes that Dr. Dm Sanchez did her left knee patellar replacement previously. Patient's medications, allergies, past medical, surgical, social and family histories were reviewed and updated as appropriate. Physical Exam:   Constitutional:  Pt well groomed, no acute distress, well developed, no obvious deformities  Vitals:    06/19/21 1123   Resp: 15   Weight: 180 lb (81.6 kg)   Height: 5' 7\" (1.702 m)     -Oriented to person, place, and time  -mood and affect are appropriate    Knee exam - right knee exam shows;    -range of motion of R. Knee is 0 to 120, and L. Knee is 0 to 120. The patient does not have  pain on motion  -there is not an effusion  - there is tenderness over the  suprapatellar, patellar region  -there are not any masses  -there is not  deformity noted.    Severo Rhymes testing negative  -Positive grind test    Contralateral Exam:  -No obvious deformities  -No abrasions or cellulitis noted, NVI   -Full ROM   -No joint laxity  -no palpable tenderness noted    Neurological exam:   -Deep tendon reflexes are 2/4 at the knees and 2/4 at the ankles with strong extensor hallicus longus motor strength bilaterally. --Distal pulses DP/PT: R. 2+; L. 2+.     Skin exam:  There is not any cellulitis, lymphedema or cutaneous lesions noted in the lower extremities. Xray: 4 view of right knee obtained today shows: No acute fractures or deformity; medial and lateral compartments fairly well-maintained; severe degeneration of the patellofemoral joint space with slight lateral tilt of the patella; it is noted that the patient has a left patellar replacement    Assessment:   right knee patellar osteoarthritis, patellofemoral syndrome    Plan:  Patient was given a prescription for meloxicam.  She was advised to elevate, rest and ice her knees. She was given exercises to do at home. She will follow up with up with one of our physicians in 2 weeks if her pain is not improved. No orders of the defined types were placed in this encounter.

## 2021-07-01 ENCOUNTER — OFFICE VISIT (OUTPATIENT)
Dept: ORTHOPEDIC SURGERY | Age: 56
End: 2021-07-01
Payer: COMMERCIAL

## 2021-07-01 VITALS — WEIGHT: 180 LBS | HEIGHT: 67 IN | BODY MASS INDEX: 28.25 KG/M2

## 2021-07-01 DIAGNOSIS — M17.11 PRIMARY OSTEOARTHRITIS OF RIGHT KNEE: Primary | ICD-10-CM

## 2021-07-01 DIAGNOSIS — M25.561 CHRONIC PAIN OF RIGHT KNEE: ICD-10-CM

## 2021-07-01 DIAGNOSIS — G89.29 CHRONIC PAIN OF RIGHT KNEE: ICD-10-CM

## 2021-07-01 PROCEDURE — 99213 OFFICE O/P EST LOW 20 MIN: CPT | Performed by: ORTHOPAEDIC SURGERY

## 2021-07-01 NOTE — PROGRESS NOTES
ORTHOPAEDIC SURGERY H&P / CONSULTATION NOTE    Chief complaint:   Chief Complaint   Patient presents with    Knee Pain     RIGHT KNEE PAIN        History of present illness: The patient is a 60 y.o. female with subjective symptoms of right knee pain. The chief complaint is located at anterior and posterior aspect right knee. Duration of symptoms has been for 2 weeks. The severity of symptoms is rated at 2/10 pain on intake form. Patient denies trauma. She was playing pickle ball and later had pain and swelling. She describes it as dull throbbing aching pain. She has been doing some ibuprofen over-the-counter and ice. Home exercise program.  She denies injections. She denies instability in the knee. She states pain affects ADLs and her getting up and moving around but otherwise it is better today than where it was roughly 7 to 10 days ago. She did come in the after-hours clinic for evaluation given right knee pain on 6/19/2020    The patient has tried the below listed items prior to today's consultation for above listed chief complaint.     +   Over-the-counter anti-inflammatories/prescription medication anti-inflammatory.      -   Physical therapy / guided home exercise program -     -   Previous corticosteroid injections    Past medical history:    Past Medical History:   Diagnosis Date    Cancer ([facility] [address] 2009    MELANOMA INSITU ON BACK    Osteoarthritis     PKU (phenylketonuria) ([facility] [address]         Past surgical history:    Past Surgical History:   Procedure Laterality Date    JOINT REPLACEMENT Left     partial knee    KNEE SURGERY      SHOULDER ARTHROPLASTY Right 5/19/2020    RIGHT SHOULDER ARTHROTOMY, REMOVAL OF LOOSE BODIES, OPEN BICEPS TENODESIS, ANATOMIC TOTAL SHOULDER ARTHROPLASTY performed by Chelsie Dennison MD at [facility] THYROID SURGERY      1/2 THYROID FOR CYST REMOVED        Allergies:  No Known Allergies      Medications:   Current Outpatient Medications:     meloxicam (MOBIC) 7.5 MG tablet, Take 1 tablet by mouth daily, Disp: 30 tablet, Rfl: 3    EPINEPHrine (EPIPEN) 0.3 MG/0.3ML SOAJ injection, Inject 0.3 mg into the muscle (Patient not taking: Reported on 6/19/2021), Disp: , Rfl:     diphenhydrAMINE (SOMINEX) 25 MG tablet, Take 1-2 tablets as directed with anaphylaxis (Patient not taking: Reported on 6/19/2021), Disp: , Rfl:     famotidine (PEPCID) 20 MG tablet, Take 20 mg by mouth daily, Disp: , Rfl:     Pegvaliase-pqpz 20 MG/ML SOSY, Inject 20 mg into the skin See Admin Instructions TAKES 6 DAYS / WEEK (TAKES EVERY DAY EXCEPT TUESDAYS), Disp: , Rfl:     cetirizine (ZYRTEC) 10 MG tablet, Take 10 mg by mouth, Disp: , Rfl:      Social history: Denies IV drug use. Social History     Socioeconomic History    Marital status: Single     Spouse name: Not on file    Number of children: Not on file    Years of education: Not on file    Highest education level: Not on file   Occupational History    Not on file   Tobacco Use    Smoking status: Never Smoker    Smokeless tobacco: Never Used   Vaping Use    Vaping Use: Never used   Substance and Sexual Activity    Alcohol use: Yes     Comment: X 1 / MONTH    Drug use: No    Sexual activity: Not on file   Other Topics Concern    Not on file   Social History Narrative    Not on file     Social Determinants of Health     Financial Resource Strain:     Difficulty of Paying Living Expenses:    Food Insecurity:     Worried About Running Out of Food in the Last Year:     [address] in the Last Year:    Transportation Needs:     Lack of Transportation (Medical):      Lack of Transportation (Non-Medical):    Physical Activity:     Days of Exercise per Week:     Minutes of Exercise per Session:    Stress:     Feeling of Stress :    Social Connections:     Frequency of Communication with Friends and Family:     Frequency of Social Gatherings with Friends and Family:     Attends Voodoo Services:     Active Member of Clubs or Organizations:     Attends Club or Organization Meetings:     Marital Status:    Intimate Partner Violence:     Fear of Current or Ex-Partner:     Emotionally Abused:     Physically Abused:     Sexually Abused: Tobacco use. Social History     Tobacco Use   Smoking Status Never Smoker   Smokeless Tobacco Never Used     Employment: Noncontributory    Workers compensation claim: Noncontributory    Review of systems: Patient denies any fevers chills chest pain shortness of breath nausea vomiting significant weight loss any change in voiding or bowel movements. Patient denies any significant numbness or tingling at baseline as it relates to this presenting symptom/chief complaint. The patient denies any significant problems with skin or any significant allergies. Physical examination:  AAOx3, NCAT  EOMI  MMM  RR  Unlabored breathing, no wheezing  Skin intact BUE and BLE, warm and moist  Bilateral lower extremity examination specific to subjective symptoms  Exam Right Lower Extremity  Trace effusion, 2/125/0 active ROM (E/F/Lag), same P assive ROM (E/F/Lag), negative anterior Drawer, 1A Lachman,   negative posterior Drawer,   Stable varus/valgus at 0 and 30?,    none TTP Joint Line, negative Gwendolyn,   positive Ghassan's, positive tenderness palpation medial/lateral patellar facet  Skin intact throughout  5/5 IP Q H TA G EHL  SILT DP SP LP MP S S  +2 DP pulse    Diagnostic imaging:  MY READ:  3 view right knee 6/19/2021: Negative fracture. Positive end-stage patellofemoral arthrosis with medial/lateral joint space narrowing/spurring    Pertinent lab work: None     Diagnosis Orders   1. Primary osteoarthritis of right knee  OSR PT [facility]       Assessment and plan: 60 y.o. female with current subjective symptoms and physical exam findings with diagnostic imaging correlating to right knee osteoarthritis.   -Greater than 50% of the time (16/30 minutes) was spent coordinating and discussing the clinical findings and diagnostic imaging results as they pertain to the patient's presenting subjective symptoms.  -I reviewed with the patient the imaging findings as well as clinical exam and  how it correlates to subjective symptoms.  -I had a pleasant discussion with the patient today. I reviewed with her that currently her clinical examination reveals primarily osteoarthritis right knee. She denies trauma. It is encouraging that this is already been improving over the last 7 to 10 days. Her exam is relatively benign except for patellofemoral related symptoms and findings  -I reviewed with her consideration for conservative care treatment options. I recommended formal physical therapy to work on peroneal reconditioning strengthening stretching and pain modalities to address knee osteoarthritis, acute on chronic exacerbation  -She had a previous left patellofemoral replacement which she states she has done well with. I did not advocate for surgery or a replacement at this time  -I recommended transitioning to over-the-counter Aleve per bottle parent discomfort OTC  -She can attempt to use a neoprene knee sleeve to help with gentle compression/swelling. She will see how she does with therapy and anti-inflammatory use and activity modification to include low impact. She will do low impact activity for the next 2 to 6 weeks as she reconditions with therapy. Should she feel that she would like a neoprene knee sleeve candidate, she may either contact our office or go to a sporting goods store to obtain. This would be a neoprene knee sleeve without hinges  -All questions answered to the patient's satisfaction and the patient expressed understanding and agreement with the above listed treatment plan  -Follow up as needed  -Thank you for the clinical consultation and allowing me to participate in the patient's care.       Electronically signed by Anuel Paulino MD on 7/1/21 at 9:31 AM EDT    Disclaimer: This note was dictated with voice recognition software. Though review and correction are routinely performed, please contact the office/medical records for any errors requiring correction.

## 2021-07-07 ENCOUNTER — HOSPITAL ENCOUNTER (OUTPATIENT)
Dept: PHYSICAL THERAPY | Age: 56
Setting detail: THERAPIES SERIES
Discharge: HOME | End: 2021-07-07
Payer: COMMERCIAL

## 2021-07-07 NOTE — FLOWSHEET NOTE
[facility],  [address]        Physical Therapy  Cancellation/No-show Note  Patient Name:  Jim Loya  :  1965   Date:  2021  Cancelled visits to date: 1  No-shows to date: 0    For today's appointment patient:  []  Cancelled  [x]  Rescheduled appointment  []  No-show     Reason given by patient:  []  Patient ill  []  Conflicting appointment  []  No transportation    []  Conflict with work  []  No reason given  [x]  Other:     Comments:  Rescheduled to [employer]    Electronically signed by:  Susanne Agudelo, PT

## 2021-07-12 ENCOUNTER — HOSPITAL ENCOUNTER (OUTPATIENT)
Dept: PHYSICAL THERAPY | Age: 56
Setting detail: THERAPIES SERIES
Discharge: HOME | End: 2021-07-12
Payer: COMMERCIAL

## 2021-07-12 PROCEDURE — 97161 PT EVAL LOW COMPLEX 20 MIN: CPT | Performed by: PHYSICAL THERAPIST

## 2021-07-12 PROCEDURE — 97110 THERAPEUTIC EXERCISES: CPT | Performed by: PHYSICAL THERAPIST

## 2021-07-12 NOTE — PLAN OF CARE
[facility], [address], [address] [place] [address]  Phone: [phone]  Fax [phone]     Physical Therapy Certification    Dear Referring Practitioner: Dr. Mitra Davis,    We had the pleasure of evaluating the following patient for physical therapy services at [address] [place] [address]. A summary of our findings can be found in the initial assessment below. This includes our plan of care. If you have any questions or concerns regarding these findings, please do not hesitate to contact me at the office phone number checked above. Thank you for the referral.       Physician Signature:_______________________________Date:__________________  By signing above (or electronic signature), therapists plan is approved by physician    Patient: Wilmer Milton   : 1965   MRN: [id number]  Referring Physician: Referring Practitioner: Dr. Mitra Davis      Evaluation Date: 2021      Medical Diagnosis Information:  Diagnosis: M17.11 (ICD-10-CM) - Primary osteoarthritis of right knee   Treatment Diagnosis: Right Knee Pain M25.561                                         Insurance information: PT Insurance Information: [employer] / No auth/ no copay/ 90 visits for OT/PT/ST       Precautions/ Contra-indications: Right shoulder replacement , Left PF component replacement      C-SSRS Triggered by Intake questionnaire (Past 2 wk assessment):   [x] No, Questionnaire did not trigger screening.   [] Yes, Patient intake triggered further evaluation      [] C-SSRS Screening completed  [] PCP notified via Plan of Care  [] Emergency services notified     Latex Allergy:  [x]NO      []YES  Preferred Language for Healthcare:   [x]English       []other:    SUBJECTIVE: Patient stated complaint:   Patient reports about a month ago was plaing pickle ball when few hours later noticed increase pain and swelling.   Attempted to self treat with ice and anti-inflammatories. Followed up with after hours then with Dr. Sonia Doty and encouraged to do PT. Only her second time playing. Has not played for a couple weeks now. Pain with descending stairs, pain with squatting, standing and kneeling. Pain initially with walking and feels stiff but improves as walks further. Pain isolated to anteriorly / superior and back of the knee. Patient had PF compartment replacement in 2009 by Dr. Jo Pizano. Continues with ice and anti-inflammatories    Relevant Medical History:Right shoulder replacement 2020, Left PF component replacement 2009   Functional Disability Index:  LEFS  47/80= 41%     Pain Scale: 2-4/10  Easing factors: ice, anti-inflammatories  Provocative factors: descending stairs, squatting, standing, kneeling, walking      Type: []Constant   [x]Intermittent  []Radiating []Localized []other:     Numbness/Tingling:  Denies    Occupation/School: Retired    Living Status/Prior Level of Function: Independent with ADLs and IADLs, golfing, tennis, pickle ball    OBJECTIVE:     ROM LEFT RIGHT   HIP Flex WNL WNL   HIP Abd WNL WNL   HIP Ext     HIP IR 30 25   HIP ER  WNL   Knee ext 0 Lacking 3 deg    Knee Flex 135 122   Ankle PF     Ankle DF WNL tight   HS flexibility WFL WFL        Strength  LEFT RIGHT   HIP Flexors 4+ 4+   HIP Abductors 4 5   HIP Ext     Hip ER 4+ 4+   Knee EXT (quad) 5 4+   Knee Flex (HS) 4+ 4+   Ankle DF 5 5   Ankle PF 5 5          Reflexes/Sensation: Not formally assessed   []Dermatomes/Myotomes intact    []Reflexes equal and normal bilaterally   []Other:    Joint mobility:    []Normal    [x]Hypo   []Hyper    Palpation: Tightness distal VL/ITB, tight bilateral quadriceps. Tight right gastroc     Functional Mobility/Transfers: Independent     Posture: Left trendelenburg in SLS. Fair stability on Left SLS. Patient increase pronation. Bandages/Dressings/Incisions: NA    Gait: (include devices/WB status) Ambulates with trendelenburg gait. with functional hip weakness/NMR control      []Signs/symptoms consistent with tendinitis/tendinosis    []signs/symptoms consistent with pathology which may benefit from Dry needling      []other:      Prognosis/Rehab Potential:      []Excellent   [x]Good    []Fair   []Poor    Tolerance of evaluation/treatment:    []Excellent   [x]Good    []Fair   []Poor  Physical Therapy Evaluation Complexity Justification  [x] A history of present problem with:  [] no personal factors and/or comorbidities that impact the plan of care;  [x]1-2 personal factors and/or comorbidities that impact the plan of care  []3 personal factors and/or comorbidities that impact the plan of care  [x] An examination of body systems using standardized tests and measures addressing any of the following: body structures and functions (impairments), activity limitations, and/or participation restrictions;:  [] a total of 1-2 or more elements   [] a total of 3 or more elements   [] a total of 4 or more elements   [x] A clinical presentation with:  [] stable and/or uncomplicated characteristics   [] evolving clinical presentation with changing characteristics  [] unstable and unpredictable characteristics;   [x] Clinical decision making of [x] low, [] moderate, [] high complexity using standardized patient assessment instrument and/or measurable assessment of functional outcome. [x] EVAL (LOW) 59165 (typically 20 minutes face-to-face)  [] EVAL (MOD) 94232 (typically 30 minutes face-to-face)  [] EVAL (HIGH) 69155 (typically 45 minutes face-to-face)  [] RE-EVAL       PLAN:   Frequency/Duration:  1-2 days per week for 6-8 Weeks:  Interventions:  [x]  Therapeutic exercise including: strength training, ROM, for Lower extremity and core   [x]  NMR activation and proprioception for LE, Glutes and Core   [x]  Manual therapy as indicated for LE, Hip and spine to include: Dry Needling/IASTM, STM, PROM, Gr I-IV mobilizations, manipulation.    [x] Modalities as needed that may include: thermal agents, E-stim, Biofeedback, US, iontophoresis as indicated  [x] Patient education on joint protection, postural re-education, activity modification, progression of HEP. HEP instruction:   Access Code: [email or web address]  URL: [email or web address]  Date: 07/12/2021  Prepared by: Primitivo Maxwell    Exercises  Long Sitting Calf Stretch with Strap - 2 x daily - 7 x weekly - 3 reps - 30 hold  Seated Table Hamstring Stretch - 2 x daily - 7 x weekly - 3 reps - 30 hold  Long Sitting Quad Set - 2 x daily - 7 x weekly - 10 reps - 10 hold  Clamshell with Resistance - 1-2 x daily - 7 x weekly - 2 sets - 10 reps - 3 hold  Supine Active Straight Leg Raise - 1-2 x daily - 7 x weekly - 2 sets - 10 reps - 3 hold  Supine Piriformis Stretch with Foot on Ground - 2 x daily - 7 x weekly - 3 reps - 30 hold  Supine Bridge - 1-2 x daily - 7 x weekly - 2 sets - 10 reps - 5 hold  Prone Quadriceps Stretch with Strap - 2 x daily - 7 x weekly - 3 reps - 30 hold  Single Leg Stance - 1-2 x daily - 7 x weekly - 5 reps - 15 hold      GOALS:  Patient stated goal: Strengthen knee and return to activity   [] Progressing: [] Met: [] Not Met: [] Adjusted    Therapist goals for Patient:   Short Term Goals: To be achieved in: 2 weeks  1. Independent in HEP and progression per patient tolerance, in order to prevent re-injury. [] Progressing: [] Met: [] Not Met: [] Adjusted  2. Patient will have a decrease in pain to facilitate improvement in movement, function, and ADLs as indicated by Functional Deficits. [] Progressing: [] Met: [] Not Met: [] Adjusted    Long Term Goals: To be achieved in: 6-8 weeks  1. Disability index score of 30% or less for the LEFS to assist with reaching prior level of function. [] Progressing: [] Met: [] Not Met: [] Adjusted  2. Patient will demonstrate increased AROM to 0-125 deg pain free of knee joint to allow for proper joint functioning as indicated by patients Functional Deficits.    [] Progressing: [] Met: [] Not Met: [] Adjusted  3. Patient will demonstrate an increase in Strength to good proximal hip strength and control, 4+/5 MMT of BLE to allow for proper functional mobility as indicated by patients Functional Deficits. [] Progressing: [] Met: [] Not Met: [] Adjusted  4. Patient will return to descending flight of stairs with reciprocal gait without increased symptoms or restriction. [] Progressing: [] Met: [] Not Met: [] Adjusted  5. Patient will start safe progression back into recreational activity of pickle ball.    [] Progressing: [] Met: [] Not Met: [] Adjusted     Electronically signed by:  Lisa Krishnane 429

## 2021-07-12 NOTE — FLOWSHEET NOTE
[facility],  [address]  Phone: [phone]  Fax [phone]    Physical Therapy Treatment Note/ Progress Report:           Date:  2021    Patient Name:  Aracelis Moulton    :  1965  MRN: [id number]  Restrictions/Precautions:    Medical/Treatment Diagnosis Information:  · Diagnosis: M17.11 (ICD-10-CM) - Primary osteoarthritis of right knee  · Treatment Diagnosis: Right Knee Pain I69.129  Insurance/Certification information:  PT Insurance Information: [employer] / No auth/ no copay/ 90 visits for OT/PT/ST  Physician Information:  Referring Practitioner: Dr. Freda Sneed  Has the plan of care been signed (Y/N):        []  Yes  [x]  No     Date of Patient follow up with Physician:       Is this a Progress Report:     []  Yes  [x]  No        If Yes:  Date Range for reporting period:  Beginning 21  Ending    Progress report will be due (10 Rx or 30 days whichever is less):        Recertification will be due (POC Duration  / 90 days whichever is less): 6-8 week POC 21      Visit # Insurance Allowable Auth Required   In-person 1 90  []  Yes [x]  No    Telehealth   []  Yes []  No    Total          Functional Scale: LEFS 41%    Date assessed:  21      Therapy Diagnosis/Practice Pattern:E      Number of Comorbidities:  []0     [x]1-2    []3+    Latex Allergy:  [x]NO      []YES  Preferred Language for Healthcare:   [x]English       []other:      Pain level:  2-4/10     SUBJECTIVE:  See eval    OBJECTIVE: See eval   Observation:    Test measurements:      RESTRICTIONS/PRECAUTIONS: Right shoulder replacement , Left PF component replacement      Exercises/Interventions:     Therapeutic Ex (21997) Sets/sec Reps Notes/CUES         GS belt stretch 30\"  3 x  HEP   HS long sitting stretch 30\"  3 x  HEP   Quad set 10\"  10 x HEP   SLR  15 x  HEP   clamshells GTB 15 x  HEP   Piriformis stretch 20\"  3 x HEP   bridge 5\" 15 x  HEP   Prone quad stretch 30\"  3 x  HEP                     Manual Intervention (12520)                                          NMR re-education (27161)   CUES NEEDED   SLS 15\"  3 x                                                     Therapeutic Activity (21126)                                          Access Code: [email or web address]  URL: [email or web address]  Date: 07/12/2021  Prepared by: Li Fam     Exercises  Long Sitting Calf Stretch with Strap - 2 x daily - 7 x weekly - 3 reps - 30 hold  Seated Table Hamstring Stretch - 2 x daily - 7 x weekly - 3 reps - 30 hold  Long Sitting Quad Set - 2 x daily - 7 x weekly - 10 reps - 10 hold  Clamshell with Resistance - 1-2 x daily - 7 x weekly - 2 sets - 10 reps - 3 hold  Supine Active Straight Leg Raise - 1-2 x daily - 7 x weekly - 2 sets - 10 reps - 3 hold  Supine Piriformis Stretch with Foot on Ground - 2 x daily - 7 x weekly - 3 reps - 30 hold  Supine Bridge - 1-2 x daily - 7 x weekly - 2 sets - 10 reps - 5 hold  Prone Quadriceps Stretch with Strap - 2 x daily - 7 x weekly - 3 reps - 30 hold  Single Leg Stance - 1-2 x daily - 7 x weekly - 5 reps - 15 hold       Therapeutic Exercise and NMR EXR  [x] (69921) Provided verbal/tactile cueing for activities related to strengthening, flexibility, endurance, ROM for improvements in LE, proximal hip, and core control with self care, mobility, lifting, ambulation.  [] (34230) Provided verbal/tactile cueing for activities related to improving balance, coordination, kinesthetic sense, posture, motor skill, proprioception  to assist with LE, proximal hip, and core control in self care, mobility, lifting, ambulation and eccentric single leg control.      NMR and Therapeutic Activities:    [] (35711 or 91186) Provided verbal/tactile cueing for activities related to improving balance, coordination, kinesthetic sense, posture, motor skill, proprioception and motor activation to allow for proper function of core, proximal hip and LE with self care and ADLs  [] (35757) Gait Re-education- Provided training and instruction to the patient for proper LE, core and proximal hip recruitment and positioning and eccentric body weight control with ambulation re-education including up and down stairs     Home Exercise Program:    [x] (73824) Reviewed/Progressed HEP activities related to strengthening, flexibility, endurance, ROM of core, proximal hip and LE for functional self-care, mobility, lifting and ambulation/stair navigation   [] (83178)Reviewed/Progressed HEP activities related to improving balance, coordination, kinesthetic sense, posture, motor skill, proprioception of core, proximal hip and LE for self care, mobility, lifting, and ambulation/stair navigation      Manual Treatments:  PROM / STM / Oscillations-Mobs:  G-I, II, III, IV (PA's, Inf., Post.)  [] (50016) Provided manual therapy to mobilize LE, proximal hip and/or LS spine soft tissue/joints for the purpose of modulating pain, promoting relaxation,  increasing ROM, reducing/eliminating soft tissue swelling/inflammation/restriction, improving soft tissue extensibility and allowing for proper ROM for normal function with self care, mobility, lifting and ambulation. Modalities:  declined   [] GAME READY (VASO)- for significant edema, swelling, pain control. Charges  Timed Code Treatment Minutes: 25   Total Treatment Minutes: 45       [x] EVAL (LOW) 28857   [] EVAL (MOD) 21397  [] EVAL (HIGH) 03540   [] RE-EVAL     [x] QV(31344) x  2   [] IONTO  [] NMR (65617) x     [] VASO  [] Manual (28519) x      [] Other:  [] TA x      [] Mech Traction (43083)  [] ES(attended) (81027)      [] ES (un) (21096):       GOALS:   Patient stated goal: Strengthen knee and return to activity   []? Progressing: []? Met: []? Not Met: []? Adjusted     Therapist goals for Patient:   Short Term Goals: To be achieved in: 2 weeks  1.  Independent in HEP and progression per patient tolerance, in order to prevent re-injury. []? Progressing: []? Met: []? Not Met: []? Adjusted  2. Patient will have a decrease in pain to facilitate improvement in movement, function, and ADLs as indicated by Functional Deficits. []? Progressing: []? Met: []? Not Met: []? Adjusted     Long Term Goals: To be achieved in: 6-8 weeks  1. Disability index score of 30% or less for the LEFS to assist with reaching prior level of function. []? Progressing: []? Met: []? Not Met: []? Adjusted  2. Patient will demonstrate increased AROM to 0-125 deg pain free of knee joint to allow for proper joint functioning as indicated by patients Functional Deficits. []? Progressing: []? Met: []? Not Met: []? Adjusted  3. Patient will demonstrate an increase in Strength to good proximal hip strength and control, 4+/5 MMT of BLE to allow for proper functional mobility as indicated by patients Functional Deficits. []? Progressing: []? Met: []? Not Met: []? Adjusted  4. Patient will return to descending flight of stairs with reciprocal gait without increased symptoms or restriction. []? Progressing: []? Met: []? Not Met: []? Adjusted  5. Patient will start safe progression back into recreational activity of pickle ball. []? Progressing: []? Met: []? Not Met: []? Adjusted            Progression Towards Functional goals:  [] Patient is progressing as expected towards functional goals listed. [] Progression is slowed due to complexities listed. [] Progression has been slowed due to co-morbidities. [x] Plan just implemented, too soon to assess goals progression  [] Other:         Overall Progression Towards Functional goals/ Treatment Progress Update:  [] Patient is progressing as expected towards functional goals listed. [] Progression is slowed due to complexities/Impairments listed. [] Progression has been slowed due to co-morbidities.   [x] Plan just implemented, too soon to assess goals progression <30days   [] Goals

## 2021-07-22 ENCOUNTER — HOSPITAL ENCOUNTER (OUTPATIENT)
Dept: PHYSICAL THERAPY | Age: 56
Setting detail: THERAPIES SERIES
Discharge: HOME | End: 2021-07-22
Payer: COMMERCIAL

## 2021-07-22 PROCEDURE — 97110 THERAPEUTIC EXERCISES: CPT | Performed by: PHYSICAL THERAPIST

## 2021-07-22 PROCEDURE — 97140 MANUAL THERAPY 1/> REGIONS: CPT | Performed by: PHYSICAL THERAPIST

## 2021-07-22 NOTE — FLOWSHEET NOTE
[facility], [address]  Phone: [phone]  Fax [phone]    Physical Therapy Treatment Note/ Progress Report:           Date:  2021    Patient Name:  Annemarie Fuchs    :  1965  MRN: [id number]  Restrictions/Precautions:    Medical/Treatment Diagnosis Information:  · Diagnosis: M17.11 (ICD-10-CM) - Primary osteoarthritis of right knee  · Treatment Diagnosis: Right Knee Pain Z53.980  Insurance/Certification information:  PT Insurance Information: [employer] / No auth/ no copay/ 90 visits for OT/PT/ST  Physician Information:  Referring Practitioner: Dr. Renetta Bruce  Has the plan of care been signed (Y/N):        []  Yes  [x]  No     Date of Patient follow up with Physician:       Is this a Progress Report:     []  Yes  [x]  No        If Yes:  Date Range for reporting period:  Beginning 21  Ending    Progress report will be due (10 Rx or 30 days whichever is less):        Recertification will be due (POC Duration  / 90 days whichever is less): 6-8 week POC 21      Visit # Insurance Allowable Auth Required   In-person 2 90  []  Yes [x]  No    Telehealth   []  Yes []  No    Total          Functional Scale: LEFS 41%    Date assessed:  21      Therapy Diagnosis/Practice Pattern:E      Number of Comorbidities:  []0     [x]1-2    []3+    Latex Allergy:  [x]NO      []YES  Preferred Language for Healthcare:   [x]English       []other:      Pain level:  2-4/10     SUBJECTIVE:  Patient reports over the weekend kneeling down to clean shower and felt knee pop and give out. Patient reports increase in swelling and pain. Improving past few days. Exercises seem to be helping and has no issues with them.      OBJECTIVE:    Observation:    Test measurements:      RESTRICTIONS/PRECAUTIONS: Right shoulder replacement , Left PF component replacement      Exercises/Interventions:     Therapeutic Ex (19369) Sets/sec Reps Notes/CUES   Bike  X 5 min     Incline stretch 30\"  3 x     GS belt stretch HEP   HS long sitting stretch 30\"  3 x  HEP   HEP   SLR  2 x 10  HEP   SL ABD  2 x 10     HEP   Piriformis stretch 20\"  3 x  HEP   bridge + SD 5\" 15 x  HEP   HEP   HR/ TR  20 x     Leg press 80# 2 10           Manual Intervention (38544)      Hawks  to gastroc/ hamstring/ popliteal fossa  X 3 min     Prone quad stretch  X 2 min     Manual HS / ITB/ GS stretch   X 3 min                       NMR re-education (64500)   CUES NEEDED   SLS 10\"  5 x HEP   LBW OVL 2 laps                                              Therapeutic Activity (57715)                                          Access Code: [email or web address]  URL: [email or web address]  Date: 07/12/2021  Prepared by: Flavio Maldonado     Exercises  Long Sitting Calf Stretch with Strap - 2 x daily - 7 x weekly - 3 reps - 30 hold  Seated Table Hamstring Stretch - 2 x daily - 7 x weekly - 3 reps - 30 hold  Long Sitting Quad Set - 2 x daily - 7 x weekly - 10 reps - 10 hold  Clamshell with Resistance - 1-2 x daily - 7 x weekly - 2 sets - 10 reps - 3 hold  Supine Active Straight Leg Raise - 1-2 x daily - 7 x weekly - 2 sets - 10 reps - 3 hold  Supine Piriformis Stretch with Foot on Ground - 2 x daily - 7 x weekly - 3 reps - 30 hold  Supine Bridge - 1-2 x daily - 7 x weekly - 2 sets - 10 reps - 5 hold  Prone Quadriceps Stretch with Strap - 2 x daily - 7 x weekly - 3 reps - 30 hold  Single Leg Stance - 1-2 x daily - 7 x weekly - 5 reps - 15 hold       Therapeutic Exercise and NMR EXR  [x] (12507) Provided verbal/tactile cueing for activities related to strengthening, flexibility, endurance, ROM for improvements in LE, proximal hip, and core control with self care, mobility, lifting, ambulation.  [] (96307) Provided verbal/tactile cueing for activities related to improving balance, coordination, kinesthetic sense, posture, motor skill, proprioception  to assist with LE, proximal hip, and core control in self care, mobility, lifting, ambulation and eccentric single leg control. NMR and Therapeutic Activities:    [] (50277 or 17247) Provided verbal/tactile cueing for activities related to improving balance, coordination, kinesthetic sense, posture, motor skill, proprioception and motor activation to allow for proper function of core, proximal hip and LE with self care and ADLs  [] (15861) Gait Re-education- Provided training and instruction to the patient for proper LE, core and proximal hip recruitment and positioning and eccentric body weight control with ambulation re-education including up and down stairs     Home Exercise Program:    [x] (04263) Reviewed/Progressed HEP activities related to strengthening, flexibility, endurance, ROM of core, proximal hip and LE for functional self-care, mobility, lifting and ambulation/stair navigation   [] (66579)Reviewed/Progressed HEP activities related to improving balance, coordination, kinesthetic sense, posture, motor skill, proprioception of core, proximal hip and LE for self care, mobility, lifting, and ambulation/stair navigation      Manual Treatments:  PROM / STM / Oscillations-Mobs:  G-I, II, III, IV (PA's, Inf., Post.)  [x] (90479) Provided manual therapy to mobilize LE, proximal hip and/or LS spine soft tissue/joints for the purpose of modulating pain, promoting relaxation,  increasing ROM, reducing/eliminating soft tissue swelling/inflammation/restriction, improving soft tissue extensibility and allowing for proper ROM for normal function with self care, mobility, lifting and ambulation. Modalities:  CP x 10 minutes   [] GAME READY (VASO)- for significant edema, swelling, pain control.      Charges  Timed Code Treatment Minutes: 35   Total Treatment Minutes: 45       [] EVAL (LOW) 62028   [] EVAL (MOD) 11387  [] EVAL (HIGH) 67914   [] RE-EVAL     [x] WH(83524) x  1   [] IONTO  [] NMR (36323) x     [] VASO  [x] Manual (64094) x 1 [] Other:  [] TA x      [] [facility] (31140)  [] ES(attended) (52765)      [] ES (un) (97875):       GOALS:   Patient stated goal: Strengthen knee and return to activity   []? Progressing: []? Met: []? Not Met: []? Adjusted     Therapist goals for Patient:   Short Term Goals: To be achieved in: 2 weeks  1. Independent in HEP and progression per patient tolerance, in order to prevent re-injury. []? Progressing: [x]? Met: []? Not Met: []? Adjusted  2. Patient will have a decrease in pain to facilitate improvement in movement, function, and ADLs as indicated by Functional Deficits. []? Progressing: []? Met: []? Not Met: []? Adjusted     Long Term Goals: To be achieved in: 6-8 weeks  1. Disability index score of 30% or less for the LEFS to assist with reaching prior level of function. []? Progressing: []? Met: []? Not Met: []? Adjusted  2. Patient will demonstrate increased AROM to 0-125 deg pain free of knee joint to allow for proper joint functioning as indicated by patients Functional Deficits. []? Progressing: []? Met: []? Not Met: []? Adjusted  3. Patient will demonstrate an increase in Strength to good proximal hip strength and control, 4+/5 MMT of BLE to allow for proper functional mobility as indicated by patients Functional Deficits. []? Progressing: []? Met: []? Not Met: []? Adjusted  4. Patient will return to descending flight of stairs with reciprocal gait without increased symptoms or restriction. []? Progressing: []? Met: []? Not Met: []? Adjusted  5. Patient will start safe progression back into recreational activity of pickle ball. []? Progressing: []? Met: []? Not Met: []? Adjusted            Progression Towards Functional goals:  [] Patient is progressing as expected towards functional goals listed. [] Progression is slowed due to complexities listed. [] Progression has been slowed due to co-morbidities.   [x] Plan just implemented, too soon to assess goals progression  [] Other: Overall Progression Towards Functional goals/ Treatment Progress Update:  [] Patient is progressing as expected towards functional goals listed. [] Progression is slowed due to complexities/Impairments listed. [] Progression has been slowed due to co-morbidities. [x] Plan just implemented, too soon to assess goals progression <30days   [] Goals require adjustment due to lack of progress  [] Patient is not progressing as expected and requires additional follow up with physician  [] Other    Prognosis for POC: [x] Good [] Fair  [] Poor      Patient requires continued skilled intervention: [x] Yes  [] No    Treatment/Activity Tolerance:  [x] Patient able to complete treatment  [] Patient limited by fatigue  [] Patient limited by pain    [] Patient limited by other medical complications  [] Other:     ASSESSMENT: Tolerated session well today. Denied any increase in pain. Continues to ambulate with deviation. Tight posterior capsule. Return to Play: (if applicable)   []  Stage 1: Intro to Strength   []  Stage 2: Return to Run and Strength   []  Stage 3: Return to Jump and Strength   []  Stage 4: Dynamic Strength and Agility   []  Stage 5: Sport Specific Training     []  Ready to Return to Play, Meets All Above Stages   [x]  Not Ready for Return to Sports   Comments:                            PLAN: See eval  [x] Continue per plan of care [] Alter current plan (see comments above)  [] Plan of care initiated [] Hold pending MD visit [] Discharge      Electronically signed by:  Jose Armando Mroin, [place]  [id number]  Note: If patient does not return for scheduled/ recommended follow up visits, this note will serve as a discharge from care along with most recent update on progress.

## 2021-07-29 ENCOUNTER — HOSPITAL ENCOUNTER (OUTPATIENT)
Dept: PHYSICAL THERAPY | Age: 56
Setting detail: THERAPIES SERIES
Discharge: HOME | End: 2021-07-29
Payer: COMMERCIAL

## 2021-07-29 PROCEDURE — 97140 MANUAL THERAPY 1/> REGIONS: CPT | Performed by: PHYSICAL THERAPIST

## 2021-07-29 PROCEDURE — 97110 THERAPEUTIC EXERCISES: CPT | Performed by: PHYSICAL THERAPIST

## 2021-07-29 NOTE — FLOWSHEET NOTE
[facility], [address]  Phone: [phone]  Fax [phone]    Physical Therapy Treatment Note/ Progress Report:           Date:  2021    Patient Name:  Kavitha White    :  1965  MRN: [id number]  Restrictions/Precautions:    Medical/Treatment Diagnosis Information:  · Diagnosis: M17.11 (ICD-10-CM) - Primary osteoarthritis of right knee  · Treatment Diagnosis: Right Knee Pain E45.305  Insurance/Certification information:  PT Insurance Information: [employer] / No auth/ no copay/ 90 visits for OT/PT/ST  Physician Information:  Referring Practitioner: Dr. Alexi Brooks  Has the plan of care been signed (Y/N):        []  Yes  [x]  No     Date of Patient follow up with Physician:       Is this a Progress Report:     []  Yes  [x]  No        If Yes:  Date Range for reporting period:  Beginning 21  Ending    Progress report will be due (10 Rx or 30 days whichever is less):        Recertification will be due (POC Duration  / 90 days whichever is less): 6-8 week POC 21      Visit # Insurance Allowable Auth Required   In-person 3 90  []  Yes [x]  No    Telehealth   []  Yes []  No    Total          Functional Scale: LEFS 41%    Date assessed:  21      Therapy Diagnosis/Practice Pattern:E      Number of Comorbidities:  []0     [x]1-2    []3+    Latex Allergy:  [x]NO      []YES  Preferred Language for Healthcare:   [x]English       []other:      Pain level: 0/10     SUBJECTIVE:  Feeling much better this week. Able to get on bike this week for 45 minutes without any issues. Denies pain currently. Still having issues with descending stairs. Most pain isolated suprapatellar and behind the knee. Felt ok after last session.      OBJECTIVE:    Observation:    Test measurements:      RESTRICTIONS/PRECAUTIONS: Right shoulder replacement , Left PF component replacement      Exercises/Interventions: Therapeutic Ex (47044) Sets/sec Reps Notes/CUES   Bike  X 5 min     Incline stretch 30\"  3 x     GS belt stretch HEP   HS long sitting stretch 30\"  3 x  HEP   HEP   Supine HS stretch 30\"  3 x     SLR  2 x 10  HEP   SL ABD bilat  2 x 10     HEP   Piriformis stretch HEP   bridge + AL 5\" 15 x  HEP   HEP   Prone TKE 5\"  15 x     HR/ TR  20 x     Leg press bilat / ecc 100# / 80# 20 x / 15 x     MILAGRO TKE 60# 20 x     FSU/ over 6\"  10 x                Manual Intervention (61866)      Hawks  to gastroc/ hamstring/ popliteal fossa  X 3 min     Prone quad stretch  X 2 min     Manual HS / ITB/ GS stretch   X 3 min                       NMR re-education (61408)   CUES NEEDED   SLS on [employer]ire[employer] 20\"  3 x HEP   LBW OVL 2 laps    Glider - retro   15 x                                        Therapeutic Activity (00612)                                          Access Code: [email or web address]  URL: [employer]/  Date: 07/12/2021  Prepared by: Redd Wills     Exercises  Long Sitting Calf Stretch with Strap - 2 x daily - 7 x weekly - 3 reps - 30 hold  Seated Table Hamstring Stretch - 2 x daily - 7 x weekly - 3 reps - 30 hold  Long Sitting Quad Set - 2 x daily - 7 x weekly - 10 reps - 10 hold  Clamshell with Resistance - 1-2 x daily - 7 x weekly - 2 sets - 10 reps - 3 hold  Supine Active Straight Leg Raise - 1-2 x daily - 7 x weekly - 2 sets - 10 reps - 3 hold  Supine Piriformis Stretch with Foot on Ground - 2 x daily - 7 x weekly - 3 reps - 30 hold  Supine Bridge - 1-2 x daily - 7 x weekly - 2 sets - 10 reps - 5 hold  Prone Quadriceps Stretch with Strap - 2 x daily - 7 x weekly - 3 reps - 30 hold  Single Leg Stance - 1-2 x daily - 7 x weekly - 5 reps - 15 hold       Therapeutic Exercise and NMR EXR  [x] (07062) Provided verbal/tactile cueing for activities related to strengthening, flexibility, endurance, ROM for improvements in LE, proximal hip, and core control with self care, mobility, lifting, ambulation.  [] (51349) Provided verbal/tactile cueing for activities related to improving balance, coordination, kinesthetic sense, posture, motor skill, proprioception  to assist with LE, proximal hip, and core control in self care, mobility, lifting, ambulation and eccentric single leg control. NMR and Therapeutic Activities:    [] (78837 or 77238) Provided verbal/tactile cueing for activities related to improving balance, coordination, kinesthetic sense, posture, motor skill, proprioception and motor activation to allow for proper function of core, proximal hip and LE with self care and ADLs  [] (85029) Gait Re-education- Provided training and instruction to the patient for proper LE, core and proximal hip recruitment and positioning and eccentric body weight control with ambulation re-education including up and down stairs     Home Exercise Program:    [x] (25629) Reviewed/Progressed HEP activities related to strengthening, flexibility, endurance, ROM of core, proximal hip and LE for functional self-care, mobility, lifting and ambulation/stair navigation   [] (21609)Reviewed/Progressed HEP activities related to improving balance, coordination, kinesthetic sense, posture, motor skill, proprioception of core, proximal hip and LE for self care, mobility, lifting, and ambulation/stair navigation      Manual Treatments:  PROM / STM / Oscillations-Mobs:  G-I, II, III, IV (PA's, Inf., Post.)  [x] (70132) Provided manual therapy to mobilize LE, proximal hip and/or LS spine soft tissue/joints for the purpose of modulating pain, promoting relaxation,  increasing ROM, reducing/eliminating soft tissue swelling/inflammation/restriction, improving soft tissue extensibility and allowing for proper ROM for normal function with self care, mobility, lifting and ambulation. Modalities:  CP x 10 minutes   [] GAME READY (VASO)- for significant edema, swelling, pain control.      Charges  Timed Code Treatment Minutes: 45   Total Treatment Minutes: 55       [] EVAL (LOW) 39951   [] EVAL (MOD) 24562  [] EVAL (HIGH) 06598   [] RE-EVAL     [x] WO(08114) x  2   [] IONTO  [] NMR (00748) x     [] VASO  [x] Manual (79775) x 1     [] Other:  [] TA x      [] Mech Traction (52602)  [] ES(attended) (16521)      [] ES (un) (21649):       GOALS:   Patient stated goal: Strengthen knee and return to activity   []? Progressing: []? Met: []? Not Met: []? Adjusted     Therapist goals for Patient:   Short Term Goals: To be achieved in: 2 weeks  1. Independent in HEP and progression per patient tolerance, in order to prevent re-injury. []? Progressing: [x]? Met: []? Not Met: []? Adjusted  2. Patient will have a decrease in pain to facilitate improvement in movement, function, and ADLs as indicated by Functional Deficits. []? Progressing: []? Met: []? Not Met: []? Adjusted     Long Term Goals: To be achieved in: 6-8 weeks  1. Disability index score of 30% or less for the LEFS to assist with reaching prior level of function. []? Progressing: []? Met: []? Not Met: []? Adjusted  2. Patient will demonstrate increased AROM to 0-125 deg pain free of knee joint to allow for proper joint functioning as indicated by patients Functional Deficits. []? Progressing: []? Met: []? Not Met: []? Adjusted  3. Patient will demonstrate an increase in Strength to good proximal hip strength and control, 4+/5 MMT of BLE to allow for proper functional mobility as indicated by patients Functional Deficits. []? Progressing: []? Met: []? Not Met: []? Adjusted  4. Patient will return to descending flight of stairs with reciprocal gait without increased symptoms or restriction. []? Progressing: []? Met: []? Not Met: []? Adjusted  5. Patient will start safe progression back into recreational activity of pickle ball. []? Progressing: []? Met: []? Not Met: []? Adjusted            Progression Towards Functional goals:  [] Patient is progressing as expected towards functional goals listed.     [] Progression is slowed due to complexities listed. [] Progression has been slowed due to co-morbidities. [x] Plan just implemented, too soon to assess goals progression  [] Other:         Overall Progression Towards Functional goals/ Treatment Progress Update:  [] Patient is progressing as expected towards functional goals listed. [] Progression is slowed due to complexities/Impairments listed. [] Progression has been slowed due to co-morbidities. [x] Plan just implemented, too soon to assess goals progression <30days   [] Goals require adjustment due to lack of progress  [] Patient is not progressing as expected and requires additional follow up with physician  [] Other    Prognosis for POC: [x] Good [] Fair  [] Poor      Patient requires continued skilled intervention: [x] Yes  [] No    Treatment/Activity Tolerance:  [x] Patient able to complete treatment  [] Patient limited by fatigue  [] Patient limited by pain    [] Patient limited by other medical complications  [] Other:     ASSESSMENT: Tolerated session well today. Denied any increase in pain. Less inflammation noted. Progress into more agility / dynamic movements next visit if pain and progress allows. Return to Play: (if applicable)   [x]  Stage 1: Intro to Strength   []  Stage 2: Return to Run and Strength   []  Stage 3: Return to Jump and Strength   []  Stage 4: Dynamic Strength and Agility   []  Stage 5: Sport Specific Training     []  Ready to Return to Play, Meets All Above Stages   [x]  Not Ready for Return to Sports   Comments:                            PLAN: See eval  [x] Continue per plan of care [] Alter current plan (see comments above)  [] Plan of care initiated [] Hold pending MD visit [] Discharge      Electronically signed by:  Flavio Maldonado, [id number] [facility]  Note: If patient does not return for scheduled/ recommended follow up visits, this note will serve as a discharge from care along with most recent update on progress.

## 2021-08-04 ENCOUNTER — HOSPITAL ENCOUNTER (OUTPATIENT)
Dept: PHYSICAL THERAPY | Age: 56
Setting detail: THERAPIES SERIES
Discharge: HOME | End: 2021-08-04
Payer: COMMERCIAL

## 2021-08-04 PROCEDURE — 97110 THERAPEUTIC EXERCISES: CPT | Performed by: PHYSICAL THERAPIST

## 2021-08-04 PROCEDURE — 97140 MANUAL THERAPY 1/> REGIONS: CPT | Performed by: PHYSICAL THERAPIST

## 2021-08-04 PROCEDURE — 97112 NEUROMUSCULAR REEDUCATION: CPT | Performed by: PHYSICAL THERAPIST

## 2021-08-04 NOTE — FLOWSHEET NOTE
[facility], [address]  Phone: [phone]  Fax [phone]    Physical Therapy Treatment Note/ Progress Report:           Date:  2021    Patient Name:  Amish Reece    :  1965  MRN: [id number]  Restrictions/Precautions:    Medical/Treatment Diagnosis Information:  · Diagnosis: M17.11 (ICD-10-CM) - Primary osteoarthritis of right knee  · Treatment Diagnosis: Right Knee Pain D15.486  Insurance/Certification information:  PT Insurance Information: [facility] / No auth/ no copay/ 90 visits for OT/PT/ST  Physician Information:  Referring Practitioner: Dr. Sonia Doty  Has the plan of care been signed (Y/N):        []  Yes  [x]  No     Date of Patient follow up with Physician:       Is this a Progress Report:     []  Yes  [x]  No        If Yes:  Date Range for reporting period:  Beginning 21  Ending    Progress report will be due (10 Rx or 30 days whichever is less): 97       Recertification will be due (POC Duration  / 90 days whichever is less): 6-8 week POC 21      Visit # Insurance Allowable Auth Required   In-person 3 90  []  Yes [x]  No    Telehealth   []  Yes []  No    Total          Functional Scale: LEFS 41%    Date assessed:  21      Therapy Diagnosis/Practice Pattern:E      Number of Comorbidities:  []0     [x]1-2    []3+    Latex Allergy:  [x]NO      []YES  Preferred Language for Healthcare:   [x]English       []other:      Pain level: 0/10     SUBJECTIVE:  Walked 2 miles yesterday, level ground. Initially had soreness but worked itself out. Able to cycle 10 miles without any complaints. Continues to feel better. Denies any pain today.      OBJECTIVE:    Observation:    Test measurements:      RESTRICTIONS/PRECAUTIONS: Right shoulder replacement , Left PF component replacement      Exercises/Interventions:     Therapeutic Ex (57971) Sets/sec Reps Notes/CUES   Bike  X 5 Minutes: 50       [] EVAL (LOW) 72698   [] EVAL (MOD) 88227  [] EVAL (HIGH) 24266   [] RE-EVAL     [x] JS(45450) x  1   [] IONTO  [x] NMR (57235) x  1   [] VASO  [x] Manual (05861) x 1     [] Other:  [] TA x      [] Mech Traction (59164)  [] ES(attended) (58142)      [] ES (un) (33076):       GOALS:   Patient stated goal: Strengthen knee and return to activity   []? Progressing: []? Met: []? Not Met: []? Adjusted     Therapist goals for Patient:   Short Term Goals: To be achieved in: 2 weeks  1. Independent in HEP and progression per patient tolerance, in order to prevent re-injury. []? Progressing: [x]? Met: []? Not Met: []? Adjusted  2. Patient will have a decrease in pain to facilitate improvement in movement, function, and ADLs as indicated by Functional Deficits. []? Progressing: []? Met: []? Not Met: []? Adjusted     Long Term Goals: To be achieved in: 6-8 weeks  1. Disability index score of 30% or less for the LEFS to assist with reaching prior level of function. []? Progressing: []? Met: []? Not Met: []? Adjusted  2. Patient will demonstrate increased AROM to 0-125 deg pain free of knee joint to allow for proper joint functioning as indicated by patients Functional Deficits. []? Progressing: []? Met: []? Not Met: []? Adjusted  3. Patient will demonstrate an increase in Strength to good proximal hip strength and control, 4+/5 MMT of BLE to allow for proper functional mobility as indicated by patients Functional Deficits. []? Progressing: []? Met: []? Not Met: []? Adjusted  4. Patient will return to descending flight of stairs with reciprocal gait without increased symptoms or restriction. []? Progressing: []? Met: []? Not Met: []? Adjusted  5. Patient will start safe progression back into recreational activity of pickle ball. []? Progressing: []? Met: []? Not Met: []? Adjusted            Progression Towards Functional goals:  [] Patient is progressing as expected towards functional goals listed. [] Progression is slowed due to complexities listed. [] Progression has been slowed due to co-morbidities. [x] Plan just implemented, too soon to assess goals progression  [] Other:         Overall Progression Towards Functional goals/ Treatment Progress Update:  [] Patient is progressing as expected towards functional goals listed. [] Progression is slowed due to complexities/Impairments listed. [] Progression has been slowed due to co-morbidities. [x] Plan just implemented, too soon to assess goals progression <30days   [] Goals require adjustment due to lack of progress  [] Patient is not progressing as expected and requires additional follow up with physician  [] Other    Prognosis for POC: [x] Good [] Fair  [] Poor      Patient requires continued skilled intervention: [x] Yes  [] No    Treatment/Activity Tolerance:  [x] Patient able to complete treatment  [] Patient limited by fatigue  [] Patient limited by pain    [] Patient limited by other medical complications  [] Other:     ASSESSMENT:  Progress into skill work and serving activities as patient tolerates. Patient tolerated agility and ladder movements well without increase pain. Hopefully moves into RTP to pickle ball in next couple weeks.       Return to Play: (if applicable)   []  Stage 1: Intro to Strength   []  Stage 2: Return to Run and Strength   []  Stage 3: Return to Jump and Strength   [x]  Stage 4: Dynamic Strength and Agility   []  Stage 5: Sport Specific Training     []  Ready to Return to Play, Meets All Above Stages   [x]  Not Ready for Return to Sports   Comments:                            PLAN: See eval  [x] Continue per plan of care [] Alter current plan (see comments above)  [] Plan of care initiated [] Hold pending MD visit [] Discharge      Electronically signed by:  Jojo Caban, [id number] [facility]  Note: If patient does not return for scheduled/ recommended follow up visits, this note will serve as a discharge from care along with most recent update on progress.

## 2021-08-11 ENCOUNTER — HOSPITAL ENCOUNTER (OUTPATIENT)
Dept: PHYSICAL THERAPY | Age: 56
Setting detail: THERAPIES SERIES
Discharge: HOME | End: 2021-08-11
Payer: COMMERCIAL

## 2021-08-11 PROCEDURE — 97112 NEUROMUSCULAR REEDUCATION: CPT | Performed by: PHYSICAL THERAPIST

## 2021-08-11 PROCEDURE — 97140 MANUAL THERAPY 1/> REGIONS: CPT | Performed by: PHYSICAL THERAPIST

## 2021-08-11 PROCEDURE — 97110 THERAPEUTIC EXERCISES: CPT | Performed by: PHYSICAL THERAPIST

## 2021-08-11 NOTE — FLOWSHEET NOTE
[facility],  [address]  Phone: [phone]  Fax [phone]    Physical Therapy Treatment Note/ Progress Report:           Date:  2021    Patient Name:  Mayco Mera    :  1965  MRN: [id number]  Restrictions/Precautions:    Medical/Treatment Diagnosis Information:  · Diagnosis: M17.11 (ICD-10-CM) - Primary osteoarthritis of right knee  · Treatment Diagnosis: Right Knee Pain I06.497  Insurance/Certification information:  PT Insurance Information: [employer] / No auth/ no copay/ 90 visits for OT/PT/ST  Physician Information:  Referring Practitioner: Dr. Glenroy Capps  Has the plan of care been signed (Y/N):        []  Yes  [x]  No     Date of Patient follow up with Physician:       Is this a Progress Report:     []  Yes  [x]  No        If Yes:  Date Range for reporting period:  Beginning 21  Ending    Progress report will be due (10 Rx or 30 days whichever is less):        Recertification will be due (POC Duration  / 90 days whichever is less): 6-8 week POC 21      Visit # Insurance Allowable Auth Required   In-person 5 90  []  Yes [x]  No    Telehealth   []  Yes []  No    Total          Functional Scale: LEFS 41%    Date assessed:  21      Therapy Diagnosis/Practice Pattern:E      Number of Comorbidities:  []0     [x]1-2    []3+    Latex Allergy:  [x]NO      []YES  Preferred Language for Healthcare:   [x]English       []other:      Pain level: 0/10     SUBJECTIVE:  Each day did activity wether walking, golfing or hitting tennis self serving against wall. Attempted some ladder drills as well without issues. No episodes of pain.      OBJECTIVE:    Observation:    Test measurements:   LEFS 13% 21    RESTRICTIONS/PRECAUTIONS: Right shoulder replacement , Left PF component replacement      Exercises/Interventions:     Therapeutic Ex (29501) Sets/sec Reps Notes/CUES   Bike  X 5 min  Level 8    Incline stretch 30\"  3 x     GS belt stretch HEP   HS long sitting stretch 30\"  3 x  HEP   HEP   Supine HS stretch    SLR 2#  2 x 10  HEP   SL ABD    HEP   Piriformis stretch HEP   bridge + MO 5\" 20 x  HEP   HEP   Prone TKE    HR/ TR    Leg press bilat / ecc 100# / 80# 20 x / 15 x     MILAGRO TKE    FSU/ over    HS curl 40# 20 x           Manual Intervention (29607)      Hawks  to gastroc/ hamstring/ popliteal fossa  X 3 min     Prone quad stretch  X 2 min     Manual HS / ITB/ GS stretch   X 3 min                       NMR re-education (05492)   CUES NEEDED   SLS on Airex 20\"  3 x HEP   LBW / monster walk  OVL 2 laps / 1 lap    Glider - retro   15 x    Fwd step / lateral step and hold 5\"  10  x          Ladder drills  X 5 min    Side shuffle z drill       Tennis hitting drill  X 3 min           Therapeutic Activity (74835)                                          Access Code: [email or web address]  URL: [email or web address]  Date: 07/12/2021  Prepared by: Kassy Jauregui     Exercises  Long Sitting Calf Stretch with Strap - 2 x daily - 7 x weekly - 3 reps - 30 hold  Seated Table Hamstring Stretch - 2 x daily - 7 x weekly - 3 reps - 30 hold  Long Sitting Quad Set - 2 x daily - 7 x weekly - 10 reps - 10 hold  Clamshell with Resistance - 1-2 x daily - 7 x weekly - 2 sets - 10 reps - 3 hold  Supine Active Straight Leg Raise - 1-2 x daily - 7 x weekly - 2 sets - 10 reps - 3 hold  Supine Piriformis Stretch with Foot on Ground - 2 x daily - 7 x weekly - 3 reps - 30 hold  Supine Bridge - 1-2 x daily - 7 x weekly - 2 sets - 10 reps - 5 hold  Prone Quadriceps Stretch with Strap - 2 x daily - 7 x weekly - 3 reps - 30 hold  Single Leg Stance - 1-2 x daily - 7 x weekly - 5 reps - 15 hold       Therapeutic Exercise and NMR EXR  [x] (84328) Provided verbal/tactile cueing for activities related to strengthening, flexibility, endurance, ROM for improvements in LE, proximal hip, and core control with self care, mobility, lifting, ambulation.  [] (25506) Provided verbal/tactile cueing for activities related to improving balance, coordination, kinesthetic sense, posture, motor skill, proprioception  to assist with LE, proximal hip, and core control in self care, mobility, lifting, ambulation and eccentric single leg control. NMR and Therapeutic Activities:    [] (17462 or 41093) Provided verbal/tactile cueing for activities related to improving balance, coordination, kinesthetic sense, posture, motor skill, proprioception and motor activation to allow for proper function of core, proximal hip and LE with self care and ADLs  [] (79220) Gait Re-education- Provided training and instruction to the patient for proper LE, core and proximal hip recruitment and positioning and eccentric body weight control with ambulation re-education including up and down stairs     Home Exercise Program:    [x] (16074) Reviewed/Progressed HEP activities related to strengthening, flexibility, endurance, ROM of core, proximal hip and LE for functional self-care, mobility, lifting and ambulation/stair navigation   [] (80566)Reviewed/Progressed HEP activities related to improving balance, coordination, kinesthetic sense, posture, motor skill, proprioception of core, proximal hip and LE for self care, mobility, lifting, and ambulation/stair navigation      Manual Treatments:  PROM / STM / Oscillations-Mobs:  G-I, II, III, IV (PA's, Inf., Post.)  [x] (76733) Provided manual therapy to mobilize LE, proximal hip and/or LS spine soft tissue/joints for the purpose of modulating pain, promoting relaxation,  increasing ROM, reducing/eliminating soft tissue swelling/inflammation/restriction, improving soft tissue extensibility and allowing for proper ROM for normal function with self care, mobility, lifting and ambulation. Modalities:     [] GAME READY (VASO)- for significant edema, swelling, pain control.      Charges  Timed Code Treatment Minutes: 50   Total Treatment Minutes: 50       [] EVAL (LOW) 72508   [] EVAL (MOD) 27868  [] EVAL (HIGH) 36391   [] RE-EVAL     [x] NJ(47822) x  1   [] IONTO  [x] NMR (15346) x  1   [] VASO  [x] Manual (15954) x 1     [] Other:  [] TA x      [] Mech Traction (30661)  [] ES(attended) (22522)      [] ES (un) (63070):       GOALS:   Patient stated goal: Strengthen knee and return to activity   []? Progressing: []? Met: []? Not Met: []? Adjusted     Therapist goals for Patient:   Short Term Goals: To be achieved in: 2 weeks  1. Independent in HEP and progression per patient tolerance, in order to prevent re-injury. []? Progressing: [x]? Met: []? Not Met: []? Adjusted  2. Patient will have a decrease in pain to facilitate improvement in movement, function, and ADLs as indicated by Functional Deficits. []? Progressing: []? Met: []? Not Met: []? Adjusted     Long Term Goals: To be achieved in: 6-8 weeks  1. Disability index score of 30% or less for the LEFS to assist with reaching prior level of function. []? Progressing: [x]? Met: []? Not Met: []? Adjusted  2. Patient will demonstrate increased AROM to 0-125 deg pain free of knee joint to allow for proper joint functioning as indicated by patients Functional Deficits. []? Progressing: [x]? Met: []? Not Met: []? Adjusted  3. Patient will demonstrate an increase in Strength to good proximal hip strength and control, 4+/5 MMT of BLE to allow for proper functional mobility as indicated by patients Functional Deficits. [x]? Progressing: []? Met: []? Not Met: []? Adjusted  4. Patient will return to descending flight of stairs with reciprocal gait without increased symptoms or restriction. []? Progressing: [x]? Met: []? Not Met: []? Adjusted  5. Patient will start safe progression back into recreational activity of pickle ball. []? Progressing: [x]? Met: []? Not Met: []?  Adjusted            Progression Towards Functional goals:  [x] Patient is progressing as expected towards functional goals listed. [] Progression is slowed due to complexities listed. [] Progression has been slowed due to co-morbidities. [] Plan just implemented, too soon to assess goals progression  [] Other:         Overall Progression Towards Functional goals/ Treatment Progress Update:  [x] Patient is progressing as expected towards functional goals listed. [] Progression is slowed due to complexities/Impairments listed. [] Progression has been slowed due to co-morbidities. [] Plan just implemented, too soon to assess goals progression <30days   [] Goals require adjustment due to lack of progress  [] Patient is not progressing as expected and requires additional follow up with physician  [] Other    Prognosis for POC: [x] Good [] Fair  [] Poor      Patient requires continued skilled intervention: [] Yes  [x] No    Treatment/Activity Tolerance:  [x] Patient able to complete treatment  [] Patient limited by fatigue  [] Patient limited by pain    [] Patient limited by other medical complications  [] Other:     ASSESSMENT:  No pain or restrictions with agility and tennis sports specific drills today in clinic. Discussed safe progression back into activities and easing back into play. Patient progressing towards all goals. Complaint with HEP. Discharge at this time.       Return to Play: (if applicable)   []  Stage 1: Intro to Strength   []  Stage 2: Return to Run and Strength   []  Stage 3: Return to Jump and Strength   []  Stage 4: Dynamic Strength and Agility   [x]  Stage 5: Sport Specific Training     [x]  Ready to Return to Play, Meets All Above Stages   []  Not Ready for Return to Sports   Comments:                            PLAN: See eval  [] Continue per plan of care [] Alter current plan (see comments above)  [] Plan of care initiated [] Hold pending MD visit [x] Discharge      Electronically signed by:  Ar Patel, [address] [facility]  Note: If patient does not return for scheduled/ recommended follow up visits, this note will serve as a discharge from care along with most recent update on progress.

## 2021-08-11 NOTE — DISCHARGE SUMMARY
[facility],  [address] [place]  Phone: [phone]  Fax [phone]       Physical Therapy Discharge  Date: 2021        Patient Name:  Chaim Red    :  1965  MRN: [id number]  Referring Physician:Dr. Dorcas Winters  Diagnosis:M17.11 (ICD-10-CM) - Primary osteoarthritis of right knee                        ICD Code:Right Knee Pain M25.561  [] Surgical [x] Conservative   Therapy Diagnosis/Practice Pattern:E      Number of Comorbidities:  []0     [x]1-2    []3+  Total number of visits: 5   Reporting Period:   Beginning Date:21   End Date:21    OBJECTIVE  Test used Initial score Discharge Score   Pain Summary  4/10 0/10   Functional questionnaire LEFS 41% 13%   Functional Testing            ROM            Strength                   Treatment to date:  [x] Therapeutic Exercise    [] Modalities:  [] Therapeutic Activity             []Ultrasound            []Electrical Stimulation  [x] Gait Training     []Cervical Traction    [] Lumbar Traction  [x] Neuromuscular Re-education [x] Cold/hotpack         []Iontophoresis  [x] Instruction in HEP      Other:  [] Manual Therapy                   []                                  []   Assessment:   [] All Goals were achieved.  [x] The following goals were achieved (#'s):   [x] The following goals were not achieved for the following reasons:/assessmen of improvement as it relates to each goal:  1. Disability index score of 30% or less for the LEFS to assist with reaching prior level of function.   []?? Progressing: [x]? ? Met: []?? Not Met: []?? Adjusted  2. Patient will demonstrate increased AROM to 0-125 deg pain free of knee joint to allow for proper joint functioning as indicated by patients Functional Deficits. []?? Progressing: [x]? ? Met: []?? Not Met: []?? Adjusted  3.  Patient will demonstrate an increase in Strength to good proximal hip strength and control, 4+/5 MMT of BLE to allow for proper functional mobility as indicated by patients Functional Deficits. [x]? ? Progressing: []?? Met: []?? Not Met: []?? Adjusted  4. Patient will return to descending flight of stairs with reciprocal gait without increased symptoms or restriction.   []?? Progressing: [x]? ? Met: []?? Not Met: []?? Adjusted  5. Patient will start safe progression back into recreational activity of pickle ball.   []?? Progressing: [x]? ? Met: []?? Not Met: []?? Adjusted          Plan of Care:  [x] Discharge from Therapy Services due to: No pain or restrictions with agility and tennis sports specific drills today in clinic. Discussed safe progression back into activities and easing back into play. Patient progressing towards all goals. Complaint with HEP. Discharge at this time. Reason for Discharge:   [] All goals achieved    [] Patient having surgery  [] Physician discontinued therapy  [] Insurance/Financial Limitations [] Patient did not return for follow up visits [] Home program/1 visit only   [] No subjective or objective improvement [] Plateaued   [] Patient was unable to adhere to the plan of care established at evaluation. [] Referred back to physician for re-evaluation and did not return.      [] Other:       Electronically signed by:  Deysi Mendoza PT

## 2022-01-27 ENCOUNTER — OFFICE VISIT (OUTPATIENT)
Dept: INTERNAL MEDICINE CLINIC | Age: 57
End: 2022-01-27
Payer: COMMERCIAL

## 2022-01-27 VITALS
OXYGEN SATURATION: 98 % | HEIGHT: 67 IN | SYSTOLIC BLOOD PRESSURE: 124 MMHG | DIASTOLIC BLOOD PRESSURE: 64 MMHG | HEART RATE: 74 BPM | WEIGHT: 190 LBS | BODY MASS INDEX: 29.82 KG/M2

## 2022-01-27 DIAGNOSIS — Z12.11 SCREEN FOR COLON CANCER: ICD-10-CM

## 2022-01-27 DIAGNOSIS — Z01.818 PREOP EXAM FOR INTERNAL MEDICINE: ICD-10-CM

## 2022-01-27 DIAGNOSIS — Z11.4 SCREENING FOR HIV (HUMAN IMMUNODEFICIENCY VIRUS): ICD-10-CM

## 2022-01-27 DIAGNOSIS — Z23 NEED FOR SHINGLES VACCINE: ICD-10-CM

## 2022-01-27 DIAGNOSIS — H25.013 CORTICAL AGE-RELATED CATARACT OF BOTH EYES: ICD-10-CM

## 2022-01-27 DIAGNOSIS — Z00.00 ANNUAL PHYSICAL EXAM: Primary | ICD-10-CM

## 2022-01-27 DIAGNOSIS — Z11.59 ENCOUNTER FOR HEPATITIS C SCREENING TEST FOR LOW RISK PATIENT: ICD-10-CM

## 2022-01-27 PROCEDURE — 90750 HZV VACC RECOMBINANT IM: CPT | Performed by: INTERNAL MEDICINE

## 2022-01-27 PROCEDURE — 99386 PREV VISIT NEW AGE 40-64: CPT | Performed by: INTERNAL MEDICINE

## 2022-01-27 PROCEDURE — 90471 IMMUNIZATION ADMIN: CPT | Performed by: INTERNAL MEDICINE

## 2022-01-27 PROCEDURE — 99203 OFFICE O/P NEW LOW 30 MIN: CPT | Performed by: INTERNAL MEDICINE

## 2022-01-27 SDOH — ECONOMIC STABILITY: FOOD INSECURITY: WITHIN THE PAST 12 MONTHS, THE FOOD YOU BOUGHT JUST DIDN'T LAST AND YOU DIDN'T HAVE MONEY TO GET MORE.: NEVER TRUE

## 2022-01-27 SDOH — ECONOMIC STABILITY: FOOD INSECURITY: WITHIN THE PAST 12 MONTHS, YOU WORRIED THAT YOUR FOOD WOULD RUN OUT BEFORE YOU GOT MONEY TO BUY MORE.: NEVER TRUE

## 2022-01-27 ASSESSMENT — PATIENT HEALTH QUESTIONNAIRE - PHQ9
2. FEELING DOWN, DEPRESSED OR HOPELESS: 0
SUM OF ALL RESPONSES TO PHQ9 QUESTIONS 1 & 2: 0
SUM OF ALL RESPONSES TO PHQ QUESTIONS 1-9: 0
1. LITTLE INTEREST OR PLEASURE IN DOING THINGS: 0

## 2022-01-27 ASSESSMENT — SOCIAL DETERMINANTS OF HEALTH (SDOH): HOW HARD IS IT FOR YOU TO PAY FOR THE VERY BASICS LIKE FOOD, HOUSING, MEDICAL CARE, AND HEATING?: NOT HARD AT ALL

## 2022-01-27 NOTE — PROGRESS NOTES
[facility]  History and Physical  Primus ANISHA Driscoll. Mary Appiah  YOB: 1965    Date of Service:  1/27/2022    Chief Complaint:   Mary Appiah is a 60 y.o. female who presents for   Chief Complaint   Patient presents with   [address] Pre-op Exam     02/17/2022-and 02/24/2022 Cataract Surgery- Dr. Faiza Brady        Assessment/Plan:    Kavitha Travis was seen today for establish care and pre-op exam.    Diagnoses and all orders for this visit:    Annual physical exam  -     Lipid Panel; Future  -     Comprehensive Metabolic Panel; Future  -     CBC Auto Differential; Future    Screen for colon cancer  -     [facility]    Need for shingles vaccine  -     Zoster recombinant [facility])    Screening for HIV (human immunodeficiency virus)  -     HIV Screen; Future    Encounter for hepatitis C screening test for low risk patient  -     Hepatitis C Antibody; Future    Cortical age-related cataract of both eyes    Preop exam for internal medicine        Return Fasting Physical in 1 year and MA for labs and shingrix #2 > 2 months. HPI: Here for Annual Physical and Follow up.     PKU:  Stable on med per genetic specialist    Lab Results   Component Value Date    LABA1C 5.1 05/15/2020    LABMICR YES 05/15/2020     Lab Results   Component Value Date     05/15/2020    K 3.8 05/15/2020     05/15/2020    CO2 27 05/15/2020    BUN 9 05/15/2020    CREATININE 0.6 05/15/2020    GLUCOSE 87 05/15/2020    CALCIUM 9.2 05/15/2020     No results found for: CHOL, TRIG, HDL, LDLCALC, LDLDIRECT  No results found for: ALT, AST  No results found for: TSH, T4FREE  Lab Results   Component Value Date    WBC 5.3 05/15/2020    HGB 13.5 05/15/2020    HCT 41.5 05/15/2020    MCV 94.1 05/15/2020     05/15/2020     Lab Results   Component Value Date    INR 0.98 05/15/2020      No results found for: PSA   No results found for: LABURIC     Wt Readings from Last 3 Encounters:   01/27/22 190 lb (86.2 kg)   07/01/21 180 lb (81.6 kg)   06/19/21 180 lb (81.6 kg)     BP Readings from Last 3 Encounters:   01/27/22 124/64   05/19/20 115/71   05/19/20 130/65       Patient Active Problem List   Diagnosis    Primary osteoarthritis of right shoulder    Phenylketonuria (PKU) ([facility]       No Known Allergies  Outpatient Medications Marked as Taking for the 1/27/22 encounter (Office Visit) with Kelsea Thayer MD   Medication Sig Dispense Refill    Pegvaliase-pqpz 20 MG/ML SOSY Inject 20 mg into the skin daily          Past Medical History:   Diagnosis Date    Cancer ([facility] 2009    MELANOMA INSITU ON BACK    Osteoarthritis     PKU (phenylketonuria) ([facility]      Past Surgical History:   Procedure Laterality Date    ANKLE SURGERY Right 1992    spur removal    JOINT REPLACEMENT Left     partial knee    SHOULDER ARTHROPLASTY Right 05/19/2020    RIGHT SHOULDER ARTHROTOMY, REMOVAL OF LOOSE BODIES, OPEN BICEPS TENODESIS, ANATOMIC TOTAL SHOULDER ARTHROPLASTY performed by Judy Bhandari MD at [address]  2009    left upper back    THYROID SURGERY Right 2001    right  THYROID FOR CYST REMOVED     Family History   Problem Relation Age of Onset    High Blood Pressure Father     No Known Problems Sister     Cancer Mother         lung     Other Brother         PKU    Stroke Maternal Grandmother     Cancer Maternal Grandfather         lung    Diabetes Paternal Grandmother     Cancer Paternal Grandfather         ? abd    Cancer Brother         melanoma     Heart Attack Maternal Uncle 79    Heart Disease Maternal Uncle     Lymphoma Paternal Aunt     Heart Attack Maternal Uncle 79    Heart Disease Maternal Uncle     Mental Illness Maternal Uncle 61        suicide    Heart Disease Paternal Aunt     High Blood Pressure Paternal Aunt      Social History     Socioeconomic History    Marital status: Single     Spouse name: Not on file    Number of children: Not on file  Years of education: Not on file    Highest education level: Not on file   Occupational History    Not on file   Tobacco Use    Smoking status: Never Smoker    Smokeless tobacco: Never Used   Vaping Use    Vaping Use: Never used   Substance and Sexual Activity    Alcohol use: Yes     Comment: X 1 / MONTH    Drug use: No    Sexual activity: Not Currently   Other Topics Concern    Not on file   Social History Narrative    Not on file     Social Determinants of Health     Financial Resource Strain: Low Risk     Difficulty of Paying Living Expenses: Not hard at all   Food Insecurity: No Food Insecurity    Worried About [address] in the Last Year: Never true    [facility] in the Last Year: Never true   Transportation Needs:     Lack of Transportation (Medical): Not on file    Lack of Transportation (Non-Medical): Not on file   Physical Activity:     Days of Exercise per Week: Not on file    Minutes of Exercise per Session: Not on file   Stress:     Feeling of Stress : Not on file   Social Connections:     Frequency of Communication with Friends and Family: Not on file    Frequency of Social Gatherings with Friends and Family: Not on file    Attends Mormonism Services: Not on file    Active Member of [address] [place] [address] or Organizations: Not on file    Attends Club or Organization Meetings: Not on file    Marital Status: Not on file   Intimate Partner Violence:     Fear of Current or Ex-Partner: Not on file    Emotionally Abused: Not on file    Physically Abused: Not on file    Sexually Abused: Not on file   Housing Stability:     Unable to Pay for Housing in the Last Year: Not on file    Number of [place] in the Last Year: Not on file    Unstable Housing in the Last Year: Not on file       Review of Systems:  A comprehensive review of systems was negative except for what was noted in the HPI.      Physical Exam:   Vitals:    01/27/22 1150   BP: 124/64   Pulse: 74   SpO2: 98% Weight: 190 lb (86.2 kg)   Height: 5' 7\" (1.702 m)     Body mass index is 29.76 kg/m². Constitutional: She is oriented to person, place, and time. She appears well-developed and well-nourished. No distress. HEENT:   Head: Normocephalic and atraumatic. Right Ear: Tympanic membrane, external ear and ear canal normal.   Left Ear: Tympanic membrane, external ear and ear canal normal.   Mouth/Throat: Oropharynx is clear and moist, and mucous membranes are normal.  There is no cervical adenopathy. Eyes: Conjunctivae and extraocular motions are normal. Pupils are equal, round, and reactive to light. Neck: Supple. No JVD present. Carotid bruit is not present. No mass and no thyromegaly present. Cardiovascular: Normal rate, regular rhythm, normal heart sounds and intact distal pulses. Pulmonary/Chest: Effort normal and breath sounds normal. No respiratory distress. She has no wheezes, rhonchi or rales. Abdominal: Soft, non-tender. Bowel sounds and aorta are normal. She exhibits no organomegaly, mass or bruit. Musculoskeletal: Normal range of motion, no synovitis. She exhibits no edema. Neurological: She is alert and oriented to person, place, and time. She has normal reflexes. No cranial nerve deficit. Coordination normal.   Skin: Skin is warm and dry. There is no rash or erythema. No suspicious lesions noted.        Preventive Care:  Health Maintenance   Topic Date Due    Hepatitis C screen  Never done    Depression Screen  Never done    HIV screen  Never done    Lipid screen  Never done    Colon cancer screen colonoscopy  Never done    Shingles Vaccine (1 of 2) Never done    Flu vaccine (1) Never done    Cervical cancer screen  01/27/2022 (Originally 12/4/1984)    Breast cancer screen  01/21/2023    DTaP/Tdap/Td vaccine (2 - Td or Tdap) 10/01/2029    COVID-19 Vaccine  Completed    Hepatitis A vaccine  Aged Out    Hepatitis B vaccine  Aged Out    Hib vaccine  Aged C/ Micah Rod 19 Meningococcal (ACWY) vaccine  Aged Out    Pneumococcal 0-64 years Vaccine  Aged Out        Recommendations for [employer] Due: see orders and patient instructions/AVS.

## 2022-01-27 NOTE — PROGRESS NOTES
PREOPERATIVE CONSULTATION      Shyann Sanchez  YOB: 1965    Date of Service:  1/27/2022    Chief Complaint   Patient presents with   [address] Pre-op Exam     02/17/2022-and 02/24/2022 Cataract Surgery- Dr. Manuel Pantoja          Assessment/Plan:    60 y.o. patient with planned surgery as above. SURGERY SPECIFIC RISK:  none  Known risk factors for perioperative complications: None  Current medications which may produce withdrawal symptoms if withheld perioperatively: none     1. Preoperative workup as follows: none  2. Change in medication regimen before surgery: None  3. Prophylaxis for cardiac events with perioperative beta-blockers: Not indicated  ACC/AHA indications for pre-operative beta-blocker use:    · Vascular surgery with history of postitive stress test  · Intermediate or high risk surgery with history of CAD   · Intermediate or high risk surgery with multiple clinical predictors of CAD- 2 of the following: history of compensated or prior heart failure, history of cerebrovascular disease, DM, or renal insufficiency  Routine administration of higher-dose, long-acting metoprolol in beta-blockernaïve patients on the day of surgery, and in the absence of dose titration is associated with an overall increase in mortality. Beta-blockers should be started days to weeks prior to surgery and titrated to pulse < 70.  4. Deep vein thrombosis prophylaxis: regimen to be chosen by surgical team     Johnie Boo was seen today for establish care and pre-op exam.    Diagnoses and all orders for this visit:      Cortical age-related cataract of both eyes    Preop exam for internal medicine    Annual physical exam  -     Lipid Panel; Future  -     Comprehensive Metabolic Panel;  Future  -     CBC Auto Differential; Future    Screen for colon cancer  -     [facility]    Need for shingles vaccine  -     Zoster recombinant [facility])      Stable and continue on current medications. No contraindications to planned surgery        HPI:  Ludmila Escalante is a 60 y.o. This patient presents to the office today for a preoperative consultation at the request of surgeon, Dr. Wang Corona, who plans on performing LEFT CATARACT ON 2/17/22 AND RIGHT CATARACT on February 24 at [address] [place] [address] in [facility]. The current problem began 3 years ago, and symptoms have been worsening with time. Conservative therapy: No.      Planned anesthesia: Local   Known anesthesia problems: None   Bleeding risk: No recent or remote history of abnormal bleeding  Personal or FH of DVT/PE: No    Patient objection to receiving blood products: No    No Known Allergies  Outpatient Medications Marked as Taking for the 1/27/22 encounter (Office Visit) with Javier Guevara MD   Medication Sig Dispense Refill    Pegvaliase-pqpz 20 MG/ML SOSY Inject 20 mg into the skin daily          Patient Active Problem List   Diagnosis    Primary osteoarthritis of right shoulder    Phenylketonuria (PKU) ([facility]       Past Medical History:   Diagnosis Date    Cancer ([facility] 2009    MELANOMA INSITU ON BACK    Osteoarthritis     PKU (phenylketonuria) ([facility]      Past Surgical History:   Procedure Laterality Date    ANKLE SURGERY Right 1992    spur removal    JOINT REPLACEMENT Left     partial knee    SHOULDER ARTHROPLASTY Right 05/19/2020    RIGHT SHOULDER ARTHROTOMY, REMOVAL OF LOOSE BODIES, OPEN BICEPS TENODESIS, ANATOMIC TOTAL SHOULDER ARTHROPLASTY performed by Jason Grover MD at [address]  2009    left upper back    THYROID SURGERY Right 2001    right  THYROID FOR CYST REMOVED     Family History   Problem Relation Age of Onset    High Blood Pressure Father     No Known Problems Sister     Cancer Mother         lung     Other Brother         PKU    Stroke Maternal Grandmother     Cancer Maternal Grandfather         lung    Diabetes Paternal Grandmother     Cancer Paternal Grandfather         ? abd    Cancer Brother         melanoma     Heart Attack Maternal Uncle 79    Heart Disease Maternal Uncle     Lymphoma Paternal Aunt     Heart Attack Maternal Uncle 79    Heart Disease Maternal Uncle     Mental Illness Maternal Uncle 61        suicide    Heart Disease Paternal Aunt     High Blood Pressure Paternal Aunt      Social History     Socioeconomic History    Marital status: Single     Spouse name: Not on file    Number of children: Not on file    Years of education: Not on file    Highest education level: Not on file   Occupational History    Not on file   Tobacco Use    Smoking status: Never Smoker    Smokeless tobacco: Never Used   Vaping Use    Vaping Use: Never used   Substance and Sexual Activity    Alcohol use: Yes     Comment: X 1 / MONTH    Drug use: No    Sexual activity: Not Currently   Other Topics Concern    Not on file   Social History Narrative    Not on file     Social Determinants of Health     Financial Resource Strain: Low Risk     Difficulty of Paying Living Expenses: Not hard at all   Food Insecurity: No Food Insecurity    Worried About Running Out of Food in the Last Year: Never true    [address] in the Last Year: Never true   Transportation Needs:     Lack of Transportation (Medical): Not on file    Lack of Transportation (Non-Medical):  Not on file   Physical Activity:     Days of Exercise per Week: Not on file    Minutes of Exercise per Session: Not on file   Stress:     Feeling of Stress : Not on file   Social Connections:     Frequency of Communication with Friends and Family: Not on file    Frequency of Social Gatherings with Friends and Family: Not on file    Attends Bahai Services: Not on file    Active Member of Clubs or Organizations: Not on file    Attends Club or Organization Meetings: Not on file    Marital Status: Not on file   Intimate Partner Violence:     Fear of Current or Ex-Partner: Not on file    Emotionally Abused: Not on file    Physically Abused: Not on file    Sexually Abused: Not on file   Housing Stability:     Unable to Pay for Housing in the Last Year: Not on file    Number of Places Lived in the Last Year: Not on file    Unstable Housing in the Last Year: Not on file       Review of Systems  A comprehensive review of systems was negative except for what was noted in the HPI. Physical Exam   Vitals:    01/27/22 1150   BP: 124/64   Pulse: 74   SpO2: 98%   Weight: 190 lb (86.2 kg)   Height: 5' 7\" (1.702 m)     Wt Readings from Last 2 Encounters:   01/27/22 190 lb (86.2 kg)   07/01/21 180 lb (81.6 kg)     BP Readings from Last 3 Encounters:   01/27/22 124/64   05/19/20 115/71   05/19/20 130/65     Body mass index is 29.76 kg/m². Constitutional: She is oriented to person, place, and time. She appears well-developed and well-nourished. No distress. HENT:   Head: Normocephalic and atraumatic. Mouth/Throat: Uvula is midline, oropharynx is clear and moist and mucous membranes are normal.   Eyes: Conjunctivae and EOM are normal. Pupils are equal, round, and reactive to light. Neck: Trachea normal and normal range of motion. Neck supple. No JVD present. Carotid bruit is not present. No mass and no thyromegaly present. Cardiovascular: Normal rate, regular rhythm, normal heart sounds and intact distal pulses. Exam reveals no gallop and no friction rub. No murmur heard. Pulmonary/Chest: Effort normal and breath sounds normal. No respiratory distress. She has no wheezes. She has no rales. Abdominal: Soft. Normal aorta and bowel sounds are normal. She exhibits no distension and no mass. There is no hepatosplenomegaly. No tenderness. Musculoskeletal: She exhibits no edema and no tenderness. Neurological: She is alert and oriented to person, place, and time. She has normal strength. No cranial nerve deficit or sensory deficit. Coordination and gait normal.   Skin: Skin is warm and dry. No rash noted. No erythema. Lab Results   Component Value Date    LABA1C 5.1 05/15/2020    LABMICR YES 05/15/2020     Lab Results   Component Value Date     05/15/2020    K 3.8 05/15/2020     05/15/2020    CO2 27 05/15/2020    BUN 9 05/15/2020    CREATININE 0.6 05/15/2020    GLUCOSE 87 05/15/2020    CALCIUM 9.2 05/15/2020     No results found for: CHOL, TRIG, HDL, LDLCALC, LDLDIRECT  No results found for: ALT, AST  No results found for: TSH, T4FREE  Lab Results   Component Value Date    WBC 5.3 05/15/2020    HGB 13.5 05/15/2020    HCT 41.5 05/15/2020    MCV 94.1 05/15/2020     05/15/2020     Lab Results   Component Value Date    INR 0.98 05/15/2020      No results found for: PSA   No results found for: LABURIC         No contraindications to planned surgery      . Allen Farias M.D.   [facility]  Office: [phone]  Fax: [phone]

## 2022-03-30 ENCOUNTER — NURSE ONLY (OUTPATIENT)
Dept: INTERNAL MEDICINE CLINIC | Age: 57
End: 2022-03-30
Payer: COMMERCIAL

## 2022-03-30 DIAGNOSIS — Z11.59 ENCOUNTER FOR HEPATITIS C SCREENING TEST FOR LOW RISK PATIENT: ICD-10-CM

## 2022-03-30 DIAGNOSIS — Z00.00 ANNUAL PHYSICAL EXAM: ICD-10-CM

## 2022-03-30 DIAGNOSIS — Z11.4 SCREENING FOR HIV (HUMAN IMMUNODEFICIENCY VIRUS): ICD-10-CM

## 2022-03-30 DIAGNOSIS — Z23 NEED FOR SHINGLES VACCINE: Primary | ICD-10-CM

## 2022-03-30 LAB
BASOPHILS ABSOLUTE: 0 K/UL (ref 0–0.2)
BASOPHILS RELATIVE PERCENT: 0.7 %
EOSINOPHILS ABSOLUTE: 0.1 K/UL (ref 0–0.6)
EOSINOPHILS RELATIVE PERCENT: 2.1 %
HCT VFR BLD CALC: 42 % (ref 36–48)
HEMOGLOBIN: 13.8 G/DL (ref 12–16)
HEPATITIS C ANTIBODY INTERPRETATION: NORMAL
LYMPHOCYTES ABSOLUTE: 1.4 K/UL (ref 1–5.1)
LYMPHOCYTES RELATIVE PERCENT: 28.7 %
MCH RBC QN AUTO: 30.6 PG (ref 26–34)
MCHC RBC AUTO-ENTMCNC: 32.9 G/DL (ref 31–36)
MCV RBC AUTO: 92.9 FL (ref 80–100)
MONOCYTES ABSOLUTE: 0.2 K/UL (ref 0–1.3)
MONOCYTES RELATIVE PERCENT: 5 %
NEUTROPHILS ABSOLUTE: 3.1 K/UL (ref 1.7–7.7)
NEUTROPHILS RELATIVE PERCENT: 63.5 %
PDW BLD-RTO: 14.6 % (ref 12.4–15.4)
PLATELET # BLD: 239 K/UL (ref 135–450)
PMV BLD AUTO: 8.4 FL (ref 5–10.5)
RBC # BLD: 4.52 M/UL (ref 4–5.2)
WBC # BLD: 4.9 K/UL (ref 4–11)

## 2022-03-30 PROCEDURE — 90750 HZV VACC RECOMBINANT IM: CPT | Performed by: INTERNAL MEDICINE

## 2022-03-30 PROCEDURE — 99999 PR OFFICE/OUTPT VISIT,PROCEDURE ONLY: CPT | Performed by: INTERNAL MEDICINE

## 2022-03-30 PROCEDURE — 36415 COLL VENOUS BLD VENIPUNCTURE: CPT | Performed by: INTERNAL MEDICINE

## 2022-03-30 PROCEDURE — 90471 IMMUNIZATION ADMIN: CPT | Performed by: INTERNAL MEDICINE

## 2022-03-31 LAB
A/G RATIO: 1.6 (ref 1.1–2.2)
ALBUMIN SERPL-MCNC: 4.6 G/DL (ref 3.4–5)
ALP BLD-CCNC: 82 U/L (ref 40–129)
ALT SERPL-CCNC: 16 U/L (ref 10–40)
ANION GAP SERPL CALCULATED.3IONS-SCNC: 16 MMOL/L (ref 3–16)
AST SERPL-CCNC: 14 U/L (ref 15–37)
BILIRUB SERPL-MCNC: 0.3 MG/DL (ref 0–1)
BUN BLDV-MCNC: 12 MG/DL (ref 7–20)
CALCIUM SERPL-MCNC: 9.6 MG/DL (ref 8.3–10.6)
CHLORIDE BLD-SCNC: 108 MMOL/L (ref 99–110)
CHOLESTEROL, TOTAL: 253 MG/DL (ref 0–199)
CO2: 23 MMOL/L (ref 21–32)
CREAT SERPL-MCNC: 0.7 MG/DL (ref 0.6–1.1)
GFR AFRICAN AMERICAN: >60
GFR NON-AFRICAN AMERICAN: >60
GLUCOSE BLD-MCNC: 82 MG/DL (ref 70–99)
HDLC SERPL-MCNC: 46 MG/DL (ref 40–60)
HIV AG/AB: NORMAL
HIV ANTIGEN: NORMAL
HIV-1 ANTIBODY: NORMAL
HIV-2 AB: NORMAL
LDL CHOLESTEROL CALCULATED: 171 MG/DL
POTASSIUM SERPL-SCNC: 4.2 MMOL/L (ref 3.5–5.1)
SODIUM BLD-SCNC: 147 MMOL/L (ref 136–145)
TOTAL PROTEIN: 7.4 G/DL (ref 6.4–8.2)
TRIGL SERPL-MCNC: 181 MG/DL (ref 0–150)
VLDLC SERPL CALC-MCNC: 36 MG/DL

## 2022-05-26 ENCOUNTER — OFFICE VISIT (OUTPATIENT)
Dept: ORTHOPEDIC SURGERY | Age: 57
End: 2022-05-26
Payer: COMMERCIAL

## 2022-05-26 VITALS — BODY MASS INDEX: 29.82 KG/M2 | HEIGHT: 67 IN | WEIGHT: 190 LBS

## 2022-05-26 DIAGNOSIS — M25.511 RIGHT SHOULDER PAIN, UNSPECIFIED CHRONICITY: Primary | ICD-10-CM

## 2022-05-26 DIAGNOSIS — Z96.611 STATUS POST TOTAL REPLACEMENT OF RIGHT SHOULDER: ICD-10-CM

## 2022-05-26 PROCEDURE — 99213 OFFICE O/P EST LOW 20 MIN: CPT | Performed by: ORTHOPAEDIC SURGERY

## 2022-05-26 NOTE — PROGRESS NOTES
Chief Complaint    Follow-up (F/U RT SHOULDER)      History of Present Illness:  Kavitha White is a pleasant, 60 y.o., female, here today for a two year follow up of right anatomic total shoulder. She states this surgery has exceeded her expectations. She reports no new injuries or setbacks. She has no new concerns. She is a retired teacher and enjoys playing golf. Pain Assessment  Location of Pain: Shoulder  Location Modifiers: Right  Severity of Pain: 0  Limiting Behavior: No  Relieving Factors: Rest  Result of Injury: No  Work-Related Injury: No  Are there other pain locations you wish to document?: No      Medical History:  Patient's medications, allergies, past medical, surgical, social and family histories were reviewed and updated as appropriate. No notes on file    Review of Systems  A 14 point review of systems was completed by the patient and is available in the media section of the scanned medical record and was reviewed on 5/26/2022. The review is negative with the exception of those things mentioned in the HPI and Past Medical History    Vital Signs: There were no vitals filed for this visit. General/Appearance: Alert and oriented and in no apparent distress. Skin:  There are no skin lesions, cellulitis, or extreme edema. The patient has warm and well-perfused Bilateral upper extremities with brisk capillary refill. Right Shoulder Exam:  Inspection: No gross deformities, no signs of infection. Palpation: Tenderness not appreciated    Active Range of Motion: Forward Elevation 170, Abduction 170, External Rotation 60, Internal Rotation T8 vs T7 on the left. Strength:  External Rotation 5/5, Internal Rotation 4+/5, Champagne Toast 5/5, Supraspinatus 5/5    Special Tests: No Caden muscle deformity. Neurovascular: Sensation to light touch is intact, no motor deficits, palpable radial pulses 2+    Self assessment questionnaires were completed today.       Radiology:     Plain radiographs of the right shoulder comprising 2 views: AP and axillary lateral were obtained and reviewed in the office: Shows postsurgical changes from the right total shoulder replacement. All the components are in good placement without any signs of loosening, fractures, subluxations or dislocations.     Impression: Post anatomical shoulder replacement, stable postop changes. Assessment :  Ms. Nick Vargas is a pleasant, 60 y.o. patient who is continuing to do very well following her right anatomic total shoulder. Impression:  Encounter Diagnoses   Name Primary?  Right shoulder pain, unspecified chronicity Yes    Status post total replacement of right shoulder        Office Procedures:  Orders Placed This Encounter   Procedures    XR SHOULDER RIGHT (MIN 2 VIEWS)     Standing Status:   Future     Number of Occurrences:   1     Standing Expiration Date:   5/25/2023       Treatment Plan:  Analy Rivera continues to do very well  one year following her anatomic total shoulder. We will see her annually for the next few year unless a new problem arises, at which time we will acquire new x-rays to evaluate the total shoulder replacement. She was told to call us with any question or concern with her shoulder. Hawa Messer MD  [facility] Sportsmedicine fellow   5/26/2022    I Hawa Messer MD, was scribing for and in the presence of Dr. Elyse Saucedo. The history taking and physical examination were performed by Dr. Radha Gill. All counseling during the appointment was performed between the patient and Dr. Radha iGll. 05/26/22  12:08 PM  _____________  I was physically present and personally supervised the Orthopaedic Sports Medicine Fellow in the evaluation and development of a treatment plan for this patient. I personally interviewed the patient and performed a physical examination. In addition, I discussed the patient's condition and treatment options with them.  I have also reviewed and agree with the past medical, family and social history unless otherwise noted. All of the patient's questions were answered. Jose F Titus MD, PhD  5/26/2022

## 2022-08-28 SDOH — HEALTH STABILITY: PHYSICAL HEALTH: ON AVERAGE, HOW MANY MINUTES DO YOU ENGAGE IN EXERCISE AT THIS LEVEL?: 30 MIN

## 2022-08-28 SDOH — HEALTH STABILITY: PHYSICAL HEALTH: ON AVERAGE, HOW MANY DAYS PER WEEK DO YOU ENGAGE IN MODERATE TO STRENUOUS EXERCISE (LIKE A BRISK WALK)?: 5 DAYS

## 2022-08-29 ENCOUNTER — OFFICE VISIT (OUTPATIENT)
Dept: ORTHOPEDIC SURGERY | Age: 57
End: 2022-08-29
Payer: COMMERCIAL

## 2022-08-29 VITALS — HEIGHT: 67 IN | TEMPERATURE: 97.5 F | WEIGHT: 190 LBS | BODY MASS INDEX: 29.82 KG/M2

## 2022-08-29 DIAGNOSIS — M17.11 PRIMARY OSTEOARTHRITIS OF RIGHT KNEE: ICD-10-CM

## 2022-08-29 DIAGNOSIS — M25.562 PAIN IN BOTH KNEES, UNSPECIFIED CHRONICITY: Primary | ICD-10-CM

## 2022-08-29 DIAGNOSIS — M25.561 PAIN IN BOTH KNEES, UNSPECIFIED CHRONICITY: Primary | ICD-10-CM

## 2022-08-29 DIAGNOSIS — M17.12 PRIMARY OSTEOARTHRITIS OF LEFT KNEE: ICD-10-CM

## 2022-08-29 PROCEDURE — 20610 DRAIN/INJ JOINT/BURSA W/O US: CPT | Performed by: ORTHOPAEDIC SURGERY

## 2022-08-29 PROCEDURE — 99204 OFFICE O/P NEW MOD 45 MIN: CPT | Performed by: ORTHOPAEDIC SURGERY

## 2022-08-29 RX ORDER — METHYLPREDNISOLONE ACETATE 40 MG/ML
40 INJECTION, SUSPENSION INTRA-ARTICULAR; INTRALESIONAL; INTRAMUSCULAR; SOFT TISSUE ONCE
Status: COMPLETED | OUTPATIENT
Start: 2022-08-29 | End: 2022-08-29

## 2022-08-29 RX ADMIN — METHYLPREDNISOLONE ACETATE 40 MG: 40 INJECTION, SUSPENSION INTRA-ARTICULAR; INTRALESIONAL; INTRAMUSCULAR; SOFT TISSUE at 13:33

## 2022-08-29 NOTE — PROGRESS NOTES
Date:  2022    Name:  Finesse Red  Address:  [place] [address] [place] [address]    :  1965      Age:   60 y.o.    SSN:        Medical Record Number:  [id number]    Reason for Visit:    Chief Complaint    New Patient (Bilateral knees)      DOS:2022     HPI: Rashawn Clarke is a 60 y.o. female here today for evaluation of bilateral knee pain that has been ongoing for years. She does not recall an associated injury but she is an active individual and likes to play [employer] ball. . She presents today complaining of bilateral knee pain, right worse than left. She does have a history of patellofemoral replacement on the left. Pain is worse with twisting, lateral movements and stairs. She has undergone previous corticosteroid injections, most recently in March and April, which help her for about 3 months. She has also done physical therapy and anti-inflammatories. Pain Assessment  Location of Pain: Knee  Location Modifiers: Right, Left  Severity of Pain: 6  Quality of Pain: Dull, Sharp  Duration of Pain: Persistent  Frequency of Pain: Intermittent  Aggravating Factors:  (moving sideways, down stairs)  Limiting Behavior: Yes  Relieving Factors: Rest, Ice  Result of Injury: No  Work-Related Injury: No  Are there other pain locations you wish to document?: No  ROS: Review of systems reviewed from Patient History Form completed today and available in the patient's chart under the Media tab.      Past Medical History:   Diagnosis Date    Cancer ([facility] [address] 2009    MELANOMA INSITU ON BACK    Osteoarthritis     PKU (phenylketonuria) ([facility] [address]         Past Surgical History:   Procedure Laterality Date    ANKLE SURGERY Right     spur removal    JOINT REPLACEMENT Left     partial knee    SHOULDER ARTHROPLASTY Right 2020    RIGHT SHOULDER ARTHROTOMY, REMOVAL OF LOOSE BODIES, OPEN BICEPS TENODESIS, ANATOMIC TOTAL SHOULDER ARTHROPLASTY performed by Laura Harman MD at [address] [place] [address] EXCISION  2009    left upper back    THYROID SURGERY Right 2001    right  THYROID FOR CYST REMOVED       Family History   Problem Relation Age of Onset    High Blood Pressure Father     No Known Problems Sister     Cancer Mother         lung     Other Brother         PKU    Stroke Maternal Grandmother     Cancer Maternal Grandfather         lung    Diabetes Paternal Grandmother     Cancer Paternal Grandfather         ? abd    Cancer Brother         melanoma     Heart Attack Maternal Uncle 79    Heart Disease Maternal Uncle     Lymphoma Paternal Aunt     Heart Attack Maternal Uncle 79    Heart Disease Maternal Uncle     Mental Illness Maternal Uncle 61        suicide    Heart Disease Paternal Aunt     High Blood Pressure Paternal Aunt        Social History     Socioeconomic History    Marital status: Single     Spouse name: None    Number of children: None    Years of education: None    Highest education level: None   Tobacco Use    Smoking status: Never    Smokeless tobacco: Never   Vaping Use    Vaping Use: Never used   Substance and Sexual Activity    Alcohol use: Yes     Comment: X 1 / MONTH    Drug use: No    Sexual activity: Not Currently     Social Determinants of Health     Financial Resource Strain: Low Risk     Difficulty of Paying Living Expenses: Not hard at all   Food Insecurity: No Food Insecurity    Worried About Running Out of Food in the Last Year: Never true    Ran Out of Food in the Last Year: Never true   Physical Activity: Sufficiently Active    Days of Exercise per Week: 5 days    Minutes of Exercise per Session: 30 min   Intimate Partner Violence: Not At Risk    Fear of Current or Ex-Partner: No    Emotionally Abused: No    Physically Abused: No    Sexually Abused: No       Current Outpatient Medications   Medication Sig Dispense Refill    Pegvaliase-pqpz 20 MG/ML SOSY Inject 20 mg into the skin daily        No current facility-administered medications for this visit.        No Known Isidoro Bee, bilateral Merchants AND bilateral lateral    Impression: tibial femoral arthritis of left knee status post patella replacement; tricompartmental osteoarthritis of right knee      Assessment: 60year old female with bilateral knee osteoarthritis, s/p patella replacement of left knee    Impression:  Encounter Diagnoses   Name Primary? Pain in both knees, unspecified chronicity Yes    Primary osteoarthritis of right knee     Primary osteoarthritis of left knee        Plan: The nature and natural history of osteoarthritis was discussed in detail the patient today. Treatment options both surgical and nonsurgical were discussed in detail. Patient was counseled with regard to the importance of activity modification as well as weight control. The role for medications, intra-articular injections as well as surgery were discussed. Patient's questions were answered. Patient has a history of patella replacement of her left knee but new x-rays today show osteoarthritis medially and laterally as well as tricompartmental arthritis on the right knee. She does not feel she is ready for a knee replacement at this time. I believe she is a candidate for a right knee corticosteroid injection for her pain and inflammation as it had provided good relief in the past. We will also give her a referral back to formal, supervised physical therapy for a refresher on flexibility and strengthening exercises for her bilateral knee OA. She wishes to proceed. The indications and risks of steroid injection as well as treatment alternatives were discussed with the patient who consented to the procedure. Under sterile conditions and after informed consent was obtained the patient was given an injection into the RIGHT knee. 2cc 40 mg of Depo-Medrol and 4 mL of 1% lidocaine were placed in the knee after it was prepped with chlorhexidine. This resulted in good relief of symptoms. There were no complications.  The patient was advised to ice the knee this evening and to avoid vigorous activities for the next 2 days. They were advised to call us if there was any erythema, enduration, swelling or increasing pain. I will see her back if symptoms persist or worsen. Comfort Carcamo is in agreement with this plan. All questions were answered to patient's satisfaction and was encouraged to call with any further questions. Orders Placed This Encounter   Procedures    XR KNEE RIGHT (MIN 4 VIEWS)     Standing Status:   Future     Number of Occurrences:   1     Standing Expiration Date:   8/29/2023     Order Specific Question:   Reason for exam:     Answer:   PAIN    XR KNEE LEFT (MIN 4 VIEWS)     Standing Status:   Future     Number of Occurrences:   1     Standing Expiration Date:   8/29/2023     Order Specific Question:   Reason for exam:     Answer:   PAIN    Ambulatory referral to Physical Therapy     Referral Priority:   Routine     Referral Type:   Eval and Treat     Referral Reason:   Specialty Services Required     Requested Specialty:   Physical Therapist     Number of Visits Requested:   1         Total time spent for evaluation, education and development of treatment plan: 50 minutes      ILiv ATC, am scribing for and in the presence of Dr. Noreen Blair. 08/29/22 10:29 AM Liv Bender ATC      I attest that I met personally with the patient, performed the described exam, reviewed the radiographic studies and medical records associated with this patient and supervised the services that are described above.      Sofie French MD

## 2022-12-14 ENCOUNTER — TELEPHONE (OUTPATIENT)
Dept: FAMILY MEDICINE CLINIC | Age: 57
End: 2022-12-14

## 2022-12-14 NOTE — TELEPHONE ENCOUNTER
Informed patient Dr Kasey Martinez is not accepting patients and offered another provider, however she declined.

## 2022-12-14 NOTE — TELEPHONE ENCOUNTER
----- Message from Theresa Victor Manuel sent at 12/14/2022  8:22 AM EST -----  Subject: Appointment Request    Reason for Call: New Patient/New to Provider Appointment needed: New   Patient Request Appointment    QUESTIONS    Reason for appointment request? Requested Provider unavailable - Pasquale Braswell MD     Additional Information for Provider? pt. Cinthia Galo would like to   confirm if provider Ankita January, MD is accepting new patient's and if   so the next available date, screened green, get established transferring   from [address] [place]   ---------------------------------------------------------------------------  --------------  [address] [employer]  [phone]; OK to leave message on "[employer]Messi to respond with   electronic message via [email or web address] portal (only for patients who have   registered [email or web address] account)  ---------------------------------------------------------------------------  --------------  SCRIPT ANSWERS  COVID Screen: Juan Brizuela

## 2022-12-19 ENCOUNTER — HOSPITAL ENCOUNTER (OUTPATIENT)
Dept: WOMENS IMAGING | Age: 57
Discharge: HOME | End: 2022-12-19
Payer: COMMERCIAL

## 2022-12-19 DIAGNOSIS — Z12.31 VISIT FOR SCREENING MAMMOGRAM: ICD-10-CM

## 2022-12-19 PROCEDURE — 77067 SCR MAMMO BI INCL CAD: CPT

## 2023-08-03 ENCOUNTER — OFFICE VISIT (OUTPATIENT)
Dept: ORTHOPEDIC SURGERY | Age: 58
End: 2023-08-03
Payer: COMMERCIAL

## 2023-08-03 VITALS — BODY MASS INDEX: 32.65 KG/M2 | WEIGHT: 196 LBS | HEIGHT: 65 IN

## 2023-08-03 DIAGNOSIS — M25.511 RIGHT SHOULDER PAIN, UNSPECIFIED CHRONICITY: Primary | ICD-10-CM

## 2023-08-03 DIAGNOSIS — Z96.611 HISTORY OF TOTAL REPLACEMENT OF RIGHT SHOULDER JOINT: ICD-10-CM

## 2023-08-03 DIAGNOSIS — S46.911A STRAIN OF RIGHT SHOULDER, INITIAL ENCOUNTER: ICD-10-CM

## 2023-08-03 PROCEDURE — 99213 OFFICE O/P EST LOW 20 MIN: CPT | Performed by: ORTHOPAEDIC SURGERY

## 2023-08-03 RX ORDER — MELOXICAM 15 MG/1
15 TABLET ORAL DAILY
Qty: 30 TABLET | Refills: 3 | Status: SHIPPED | OUTPATIENT
Start: 2023-08-03 | End: 2023-09-02

## 2023-08-03 RX ORDER — EPINEPHRINE 0.3 MG/.3ML
0.3 INJECTION SUBCUTANEOUS
COMMUNITY
Start: 2022-11-03 | End: 2023-11-03

## 2023-08-03 NOTE — PROGRESS NOTES
[address] [place] [address]  History and Physical  Shoulder Pain    Date:  8/3/2023    Name:  Riya Goff  Address:  [address] [place], [place] [address] [place] [address]    :  1965      Age:   59 y.o.    SSN:  [id number]      Medical Record Number:  [id number]    Reason for Visit:    Shoulder Pain (F/U RIGHT SHOULDER)      HPI:   Riya Goff is a 59 y.o. female who presents to our office today for follow up of the right shoulder pain. This patient has a history of undergoing a right anatomic total shoulder arthroplasty on May 19, 2020. She is here for yearly visit. Patient reports that since her last visit she sustained a mechanical fall. This was back in May 2023 - she suffered a distal radial fracture that was treated nonoperatively. She has fully recovered from that wrist injury. However, she has noticed some mild discomfort with the right shoulder since the fall. The patient reports it does not interfere with her day-to-day life but she has noticed some weakness and discomfort primarily at nighttime. Pain Assessment  Location of Pain: Shoulder  Location Modifiers: Right  Severity of Pain: 0  Limiting Behavior: No  Relieving Factors: Rest  Work-Related Injury: No  Are there other pain locations you wish to document?: No    No notes on file    Review of Systems:  A 14 point review of systems available in the scanned medical record as documented by the patient and reviewed on 8/3/2023. The review is negative with the exception of those things mentioned in the History of Present Illness and Past Medical History. Past Medical History:  Patient's medications, allergies, past medical, surgical, social and family histories were reviewed and updated as appropriate. Allergies:  No Known Allergies    Physical Exam:  There were no vitals filed for this visit.   General: Riya Goff is a healthy and well appearing 59 y.o. female who is sitting comfortably in our

## 2023-08-09 ENCOUNTER — EVALUATION (OUTPATIENT)
Dept: PHYSICAL THERAPY | Age: 58
End: 2023-08-09
Payer: COMMERCIAL

## 2023-08-09 DIAGNOSIS — M25.611 DECREASED RANGE OF MOTION OF RIGHT SHOULDER: ICD-10-CM

## 2023-08-09 DIAGNOSIS — S46.011D STRAIN OF RIGHT ROTATOR CUFF CAPSULE, SUBSEQUENT ENCOUNTER: ICD-10-CM

## 2023-08-09 DIAGNOSIS — M25.511 RIGHT SHOULDER PAIN, UNSPECIFIED CHRONICITY: Primary | ICD-10-CM

## 2023-08-09 PROCEDURE — 97110 THERAPEUTIC EXERCISES: CPT | Performed by: PHYSICAL THERAPIST

## 2023-08-09 PROCEDURE — 97112 NEUROMUSCULAR REEDUCATION: CPT | Performed by: PHYSICAL THERAPIST

## 2023-08-09 PROCEDURE — 97161 PT EVAL LOW COMPLEX 20 MIN: CPT | Performed by: PHYSICAL THERAPIST

## 2023-08-16 ENCOUNTER — TREATMENT (OUTPATIENT)
Dept: PHYSICAL THERAPY | Age: 58
End: 2023-08-16

## 2023-08-16 DIAGNOSIS — M25.611 DECREASED RANGE OF MOTION OF RIGHT SHOULDER: ICD-10-CM

## 2023-08-16 DIAGNOSIS — M25.511 RIGHT SHOULDER PAIN, UNSPECIFIED CHRONICITY: Primary | ICD-10-CM

## 2023-08-16 DIAGNOSIS — S46.011D STRAIN OF RIGHT ROTATOR CUFF CAPSULE, SUBSEQUENT ENCOUNTER: ICD-10-CM

## 2023-08-21 SDOH — HEALTH STABILITY: PHYSICAL HEALTH: ON AVERAGE, HOW MANY DAYS PER WEEK DO YOU ENGAGE IN MODERATE TO STRENUOUS EXERCISE (LIKE A BRISK WALK)?: 4 DAYS

## 2023-08-21 SDOH — HEALTH STABILITY: PHYSICAL HEALTH: ON AVERAGE, HOW MANY MINUTES DO YOU ENGAGE IN EXERCISE AT THIS LEVEL?: 50 MIN

## 2023-08-21 ASSESSMENT — SOCIAL DETERMINANTS OF HEALTH (SDOH)
WITHIN THE LAST YEAR, HAVE YOU BEEN HUMILIATED OR EMOTIONALLY ABUSED IN OTHER WAYS BY YOUR PARTNER OR EX-PARTNER?: NO
WITHIN THE LAST YEAR, HAVE YOU BEEN KICKED, HIT, SLAPPED, OR OTHERWISE PHYSICALLY HURT BY YOUR PARTNER OR EX-PARTNER?: NO
WITHIN THE LAST YEAR, HAVE YOU BEEN AFRAID OF YOUR PARTNER OR EX-PARTNER?: NO
WITHIN THE LAST YEAR, HAVE TO BEEN RAPED OR FORCED TO HAVE ANY KIND OF SEXUAL ACTIVITY BY YOUR PARTNER OR EX-PARTNER?: NO

## 2023-08-22 ENCOUNTER — OFFICE VISIT (OUTPATIENT)
Dept: FAMILY MEDICINE CLINIC | Age: 58
End: 2023-08-22
Payer: COMMERCIAL

## 2023-08-22 VITALS
OXYGEN SATURATION: 98 % | DIASTOLIC BLOOD PRESSURE: 70 MMHG | HEIGHT: 65 IN | BODY MASS INDEX: 32.39 KG/M2 | SYSTOLIC BLOOD PRESSURE: 134 MMHG | HEART RATE: 69 BPM | WEIGHT: 194.4 LBS

## 2023-08-22 DIAGNOSIS — R03.0 ELEVATED BLOOD PRESSURE READING IN OFFICE WITHOUT DIAGNOSIS OF HYPERTENSION: ICD-10-CM

## 2023-08-22 DIAGNOSIS — Z12.11 COLON CANCER SCREENING: Primary | ICD-10-CM

## 2023-08-22 DIAGNOSIS — E70.1 PHENYLKETONURIA (PKU) (HCC): ICD-10-CM

## 2023-08-22 DIAGNOSIS — Z00.00 ENCOUNTER FOR WELL ADULT EXAM WITHOUT ABNORMAL FINDINGS: ICD-10-CM

## 2023-08-22 PROCEDURE — 36415 COLL VENOUS BLD VENIPUNCTURE: CPT | Performed by: INTERNAL MEDICINE

## 2023-08-22 PROCEDURE — 99214 OFFICE O/P EST MOD 30 MIN: CPT | Performed by: STUDENT IN AN ORGANIZED HEALTH CARE EDUCATION/TRAINING PROGRAM

## 2023-08-22 NOTE — PROGRESS NOTES
[facility]  Establish care visit   2023    Lyndsay Davis (:  1965) is a 59 y.o. female, here to [facility]. Chief Complaint   Patient presents with    [facility]    Other     Check on bp        ASSESSMENT/ PLAN  1. Colon cancer screening  - Fecal DNA Colorectal cancer screening (Cologuard)    2. Phenylketonuria (PKU) ([address])  Follows with [facility]. Is on a daily injectable. Has a normal diet. 3. Elevated blood pressure reading in office without diagnosis of hypertension  Patient presents today for reevaluation of blood pressure since she was told that she had an elevated blood pressure last time. Blood pressure in office today is normal.  Given that she only had an isolated elevated blood pressure at the last office visit, there is no diagnosis of hypertension at this time. Therefore, no medication is indicated at this time. Spent on evaluation and management of this patient is 30 minutes. No follow-ups on file. HPI  Patient is a 78-year-old female, who presents to [facility]. Patient recently had a well adult visit completed in 2023. Patient is originally from [place], went to [employer]", where she studied education. 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 Patient had been referred to a gastroenterologist for colorectal cancer screening. Patient states that she has no family history of colon cancer and would like to do the Cologuard test.    Patient also notes that she has an OB/GYN, her last Pap was 2 years ago, she has never had an abnormal Pap smear. Patient has a history of phenylketonuria. She follows with [facility] for this. She gets daily subcutaneous injections and reports that she milligrams restrictions on her diet.     Patient also has a history of an elevated blood

## 2023-08-23 LAB
CHOLEST SERPL-MCNC: 231 MG/DL (ref 0–199)
HDLC SERPL-MCNC: 39 MG/DL (ref 40–60)
LDLC SERPL CALC-MCNC: 134 MG/DL
TRIGL SERPL-MCNC: 290 MG/DL (ref 0–150)
VLDLC SERPL CALC-MCNC: 58 MG/DL

## 2023-08-24 ENCOUNTER — TREATMENT (OUTPATIENT)
Dept: PHYSICAL THERAPY | Age: 58
End: 2023-08-24

## 2023-08-24 DIAGNOSIS — M25.611 DECREASED RANGE OF MOTION OF RIGHT SHOULDER: ICD-10-CM

## 2023-08-24 DIAGNOSIS — M25.511 RIGHT SHOULDER PAIN, UNSPECIFIED CHRONICITY: Primary | ICD-10-CM

## 2023-08-24 DIAGNOSIS — S46.011D STRAIN OF RIGHT ROTATOR CUFF CAPSULE, SUBSEQUENT ENCOUNTER: ICD-10-CM

## 2023-08-31 ENCOUNTER — TREATMENT (OUTPATIENT)
Dept: PHYSICAL THERAPY | Age: 58
End: 2023-08-31

## 2023-08-31 DIAGNOSIS — S46.011D STRAIN OF RIGHT ROTATOR CUFF CAPSULE, SUBSEQUENT ENCOUNTER: ICD-10-CM

## 2023-08-31 DIAGNOSIS — M25.511 RIGHT SHOULDER PAIN, UNSPECIFIED CHRONICITY: Primary | ICD-10-CM

## 2023-08-31 DIAGNOSIS — M25.611 DECREASED RANGE OF MOTION OF RIGHT SHOULDER: ICD-10-CM

## 2023-09-07 ENCOUNTER — TREATMENT (OUTPATIENT)
Dept: PHYSICAL THERAPY | Age: 58
End: 2023-09-07

## 2023-09-07 DIAGNOSIS — S46.011D STRAIN OF RIGHT ROTATOR CUFF CAPSULE, SUBSEQUENT ENCOUNTER: ICD-10-CM

## 2023-09-07 DIAGNOSIS — M25.511 RIGHT SHOULDER PAIN, UNSPECIFIED CHRONICITY: Primary | ICD-10-CM

## 2023-09-07 DIAGNOSIS — M25.611 DECREASED RANGE OF MOTION OF RIGHT SHOULDER: ICD-10-CM

## 2023-09-14 ENCOUNTER — OFFICE VISIT (OUTPATIENT)
Dept: ORTHOPEDIC SURGERY | Age: 58
End: 2023-09-14
Payer: COMMERCIAL

## 2023-09-14 VITALS — BODY MASS INDEX: 32.32 KG/M2 | WEIGHT: 194 LBS | HEIGHT: 65 IN

## 2023-09-14 DIAGNOSIS — M25.511 RIGHT SHOULDER PAIN, UNSPECIFIED CHRONICITY: ICD-10-CM

## 2023-09-14 DIAGNOSIS — Z96.611 HISTORY OF TOTAL REPLACEMENT OF RIGHT SHOULDER JOINT: ICD-10-CM

## 2023-09-14 DIAGNOSIS — S46.911D STRAIN OF RIGHT SHOULDER, SUBSEQUENT ENCOUNTER: Primary | ICD-10-CM

## 2023-09-14 PROCEDURE — 99213 OFFICE O/P EST LOW 20 MIN: CPT | Performed by: ORTHOPAEDIC SURGERY

## 2023-09-14 NOTE — PROGRESS NOTES
sitting comfortably in our office in acute distress. Skin:  There are no skin lesions, cellulitis, or extreme edema. The patient has warm and well-perfused Bilateral upper extremities with brisk capillary refill. Eyes: Extra-ocular muscles intact  Mouth: Oral mucosa moist. No perioral lesions  Pulm: Respirations unlabored and regular. Neuro: Alert and oriented x3    right Shoulder Exam:  Inspection:  No gross deformities, no signs of infection. Palpation:  There is no crepitus. Non-tender to palpation over the rotator cuff footprint. Active Range of Motion: Forward elevation of 160, abduction of 160, external rotation with elbow at the side 45, internal rotation to the back is T9    Passive Range of Motion: deferred    Strength: External rotation with resistance with elbow at the side 5/5, internal rotation with resistance with elbow at the side 5/5, Champagne toast testing 5/5, Jobes test 5/5    Special Tests: Negative [place], negative belly press, negative bearhug, no Caden muscle deformity. Neurovascular: Sensation to light touch is intact, no motor deficits, palpable radial pulses 2+    Additional Examinations:    Examination of the contralateral extremity does not show any tenderness, deformity or injury. Range of motion is unremarkable. There is no gross instability. There are no rashes, ulcerations or lesions. Strength and tone are normal.      Radiographic:  X-ray not obtained today. Assessment:  Lucia Lucio is a 59 y.o. female history of a prior anatomic total shoulder arthroplasty that was done in May 2020 had sustained a contusion to the right shoulder after a mechanical fall. The residual click may relate to scar tissue but it does not appear to relate to the implant. Impression:  Encounter Diagnoses   Name Primary?     Strain of right shoulder, subsequent encounter Yes    Right shoulder pain, unspecified chronicity     History of total replacement of right shoulder joint

## 2023-11-01 ENCOUNTER — OFFICE VISIT (OUTPATIENT)
Dept: ORTHOPEDIC SURGERY | Age: 58
End: 2023-11-01
Payer: COMMERCIAL

## 2023-11-01 VITALS — HEIGHT: 67 IN | BODY MASS INDEX: 29.82 KG/M2 | WEIGHT: 190 LBS

## 2023-11-01 DIAGNOSIS — M17.11 PRIMARY OSTEOARTHRITIS OF RIGHT KNEE: ICD-10-CM

## 2023-11-01 DIAGNOSIS — M25.561 RIGHT KNEE PAIN, UNSPECIFIED CHRONICITY: Primary | ICD-10-CM

## 2023-11-01 PROCEDURE — 99214 OFFICE O/P EST MOD 30 MIN: CPT | Performed by: ORTHOPAEDIC SURGERY

## 2023-11-01 NOTE — PROGRESS NOTES
SKIN CANCER EXCISION  2009    left upper back    THYROID SURGERY Right 2001    right  THYROID FOR CYST REMOVED       Family History   Problem Relation Age of Onset    High Blood Pressure Father     No Known Problems Sister     Cancer Mother         lung     High Blood Pressure Mother     Other Brother         PKU    Stroke Maternal Grandmother     Cancer Maternal Grandfather         lung    Diabetes Paternal Grandmother     Cancer Paternal Grandfather         ? abd    Cancer Brother         melanoma     Heart Attack Maternal Uncle 79    Heart Disease Maternal Uncle     Lymphoma Paternal Aunt     Heart Attack Maternal Uncle 79    Heart Disease Maternal Uncle     Mental Illness Maternal Uncle 61        suicide    Heart Disease Paternal Aunt     High Blood Pressure Paternal Aunt        Social History     Socioeconomic History    Marital status: Single     Spouse name: None    Number of children: None    Years of education: None    Highest education level: None   Tobacco Use    Smoking status: Never    Smokeless tobacco: Never   Vaping Use    Vaping Use: Never used   Substance and Sexual Activity    Alcohol use: Not Currently     Comment: X 1 / MONTH    Drug use: No    Sexual activity: Not Currently     Partners: Male     Social Determinants of Health     Financial Resource Strain: Low Risk  (6/12/2023)    Overall Financial Resource Strain (CARDIA)     Difficulty of Paying Living Expenses: Not hard at all   Food Insecurity: No Food Insecurity (6/12/2023)    Hunger Vital Sign     Worried About Running Out of Food in the Last Year: Never true     Ran Out of Food in the Last Year: Never true   Transportation Needs: Unknown (6/12/2023)    PRAPARE - Transportation     Lack of Transportation (Non-Medical):  No   Physical Activity: Sufficiently Active (8/21/2023)    Exercise Vital Sign     Days of Exercise per Week: 4 days     Minutes of Exercise per Session: 50 min   Intimate Partner Violence: Not At Risk (8/21/2023)

## 2023-11-14 ENCOUNTER — TELEPHONE (OUTPATIENT)
Dept: ORTHOPEDIC SURGERY | Age: 58
End: 2023-11-14

## 2023-11-14 DIAGNOSIS — M17.11 PRIMARY OSTEOARTHRITIS OF RIGHT KNEE: Primary | ICD-10-CM

## 2023-11-14 NOTE — TELEPHONE ENCOUNTER
General Question     Subject: patient call and she would like for Patricia to give her a call back it's regarding her rt knee.   Patient Maureen Jade  Contact Number: [phone]

## 2023-11-27 ENCOUNTER — TELEPHONE (OUTPATIENT)
Dept: ORTHOPEDIC SURGERY | Age: 58
End: 2023-11-27

## 2023-11-27 NOTE — TELEPHONE ENCOUNTER
Surgery and/or Procedure Scheduling     Contact Name: Maureen Jade  Surgical/Procedure Request: RT KNEE TOTAL REPLACEMENT  Patient Contact Number: [phone]    PATIENT CALLED IN REQUESTING TO SPEAK WITH EDWINA IN REGARDS TO SCHEDULING SURGERY

## 2023-11-29 ENCOUNTER — TELEPHONE (OUTPATIENT)
Dept: ORTHOPEDIC SURGERY | Age: 58
End: 2023-11-29

## 2023-11-29 NOTE — TELEPHONE ENCOUNTER
Surgery and/or Procedure Scheduling     Contact Name: Maureen Jade  Surgical/Procedure Request: R KNEE SX   Patient Contact Number: [phone]  CHIN BLAND TO CLL HER TO [facility]

## 2024-01-22 ENCOUNTER — OFFICE VISIT (OUTPATIENT)
Dept: ORTHOPEDIC SURGERY | Age: 59
End: 2024-01-22
Payer: COMMERCIAL

## 2024-01-22 VITALS — BODY MASS INDEX: 29.82 KG/M2 | HEIGHT: 67 IN | WEIGHT: 190 LBS

## 2024-01-22 DIAGNOSIS — M17.11 PRIMARY OSTEOARTHRITIS OF RIGHT KNEE: Primary | ICD-10-CM

## 2024-01-22 PROCEDURE — 99213 OFFICE O/P EST LOW 20 MIN: CPT | Performed by: ORTHOPAEDIC SURGERY

## 2024-01-22 NOTE — H&P (VIEW-ONLY)
Ms. Jade is seen preoperatively for a right total knee arthroplasty.  She has previously had a left patellofemoral replacement done with good results.  She continues to have right knee pain which is associated with regular walking and other day-to-day activities.  It keeps her up at night.  She has had physical therapy as well as anti-inflammatory medications and intra-articular injections without relief.  Her x-rays confirm that she has high-grade patellofemoral arthritis with bone-on-bone changes as well as moderate to severe medial lateral compartment changes with some mild varus deformity.  After having discussed all treatment options she has chosen to proceed with total knee replacement.    She denies any history of metal allergies.  She has no medical allergies or allergies that she is aware of.  There is no history of blood clots.         Past Medical History:   Diagnosis Date    Cancer ([facility]) 2009    MELANOMA INSITU ON BACK    Osteoarthritis     PKU (phenylketonuria) ([facility])         Past Surgical History:   Procedure Laterality Date    ANKLE FRACTURE SURGERY  6/1987    ANKLE SURGERY Right 1992    spur removal    EYE SURGERY  Cataract    2022    JOINT REPLACEMENT Left     partial knee    KNEE ARTHROSCOPY  8/2003    KNEE SURGERY  12/2009    SHOULDER ARTHROPLASTY Right 05/19/2020    RIGHT SHOULDER ARTHROTOMY, REMOVAL OF LOOSE BODIES, OPEN BICEPS TENODESIS, ANATOMIC TOTAL SHOULDER ARTHROPLASTY performed by Jose F Mcnulty MD at [facility] OR    SHOULDER SURGERY      SKIN CANCER EXCISION  2009    left upper back    THYROID SURGERY Right 2001    right  THYROID FOR CYST REMOVED       Family History   Problem Relation Age of Onset    High Blood Pressure Father     No Known Problems Sister     Cancer Mother         lung     High Blood Pressure Mother     Other Brother         PKU    Stroke Maternal Grandmother     Cancer Maternal Grandfather         lung    Diabetes Paternal Grandmother     Cancer Paternal Grandfather         ?

## 2024-01-22 NOTE — PROGRESS NOTES
Ms. Jade is seen preoperatively for a right total knee arthroplasty.  She has previously had a left patellofemoral replacement done with good results.  She continues to have right knee pain which is associated with regular walking and other day-to-day activities.  It keeps her up at night.  She has had physical therapy as well as anti-inflammatory medications and intra-articular injections without relief.  Her x-rays confirm that she has high-grade patellofemoral arthritis with bone-on-bone changes as well as moderate to severe medial lateral compartment changes with some mild varus deformity.  After having discussed all treatment options she has chosen to proceed with total knee replacement.    She denies any history of metal allergies.  She has no medical allergies or allergies that she is aware of.  There is no history of blood clots.         Past Medical History:   Diagnosis Date    Cancer ([facility]) 2009    MELANOMA INSITU ON BACK    Osteoarthritis     PKU (phenylketonuria) ([facility])         Past Surgical History:   Procedure Laterality Date    ANKLE FRACTURE SURGERY  6/1987    ANKLE SURGERY Right 1992    spur removal    EYE SURGERY  Cataract    2022    JOINT REPLACEMENT Left     partial knee    KNEE ARTHROSCOPY  8/2003    KNEE SURGERY  12/2009    SHOULDER ARTHROPLASTY Right 05/19/2020    RIGHT SHOULDER ARTHROTOMY, REMOVAL OF LOOSE BODIES, OPEN BICEPS TENODESIS, ANATOMIC TOTAL SHOULDER ARTHROPLASTY performed by Jose F Mcnulty MD at [facility] OR    SHOULDER SURGERY      SKIN CANCER EXCISION  2009    left upper back    THYROID SURGERY Right 2001    right  THYROID FOR CYST REMOVED       Family History   Problem Relation Age of Onset    High Blood Pressure Father     No Known Problems Sister     Cancer Mother         lung     High Blood Pressure Mother     Other Brother         PKU    Stroke Maternal Grandmother     Cancer Maternal Grandfather         lung    Diabetes Paternal Grandmother     Cancer Paternal Grandfather         ?

## 2024-01-30 ENCOUNTER — TELEPHONE (OUTPATIENT)
Dept: ORTHOPEDIC SURGERY | Age: 59
End: 2024-01-30

## 2024-01-30 NOTE — TELEPHONE ENCOUNTER
Left vm will inform can get from our office or can google it  Insurance does not cover, it is self pay

## 2024-01-30 NOTE — TELEPHONE ENCOUNTER
Other PATIENT IS CALLING IN REQUESTING A CALL BACK SHE NEEDS TO KNOW HOW TO GET A ICE MACHINE. [phone]

## 2024-02-01 ENCOUNTER — TELEPHONE (OUTPATIENT)
Dept: ORTHOPEDIC SURGERY | Age: 59
End: 2024-02-01

## 2024-02-01 NOTE — TELEPHONE ENCOUNTER
CPT: 00766  AUTH: [employer] PER WEBSITE  INSURANCE: BC  LOCATION [facility] OF SX RT KNEE  NOTE: DOC SCANNED

## 2024-02-02 ENCOUNTER — HOSPITAL ENCOUNTER (OUTPATIENT)
Dept: WOMENS IMAGING | Age: 59
Discharge: HOME | End: 2024-02-02
Payer: COMMERCIAL

## 2024-02-02 ENCOUNTER — TELEPHONE (OUTPATIENT)
Dept: ORTHOPEDIC SURGERY | Age: 59
End: 2024-02-02

## 2024-02-02 VITALS — HEIGHT: 67 IN | WEIGHT: 190 LBS | BODY MASS INDEX: 29.82 KG/M2

## 2024-02-02 DIAGNOSIS — Z12.31 VISIT FOR SCREENING MAMMOGRAM: ICD-10-CM

## 2024-02-02 PROCEDURE — 77063 BREAST TOMOSYNTHESIS BI: CPT

## 2024-02-02 NOTE — TELEPHONE ENCOUNTER
Orthopedic Nurse Navigator Summary    Patient Name:  Maureen Jade  Anticipated Date of Surgery:  02/13/24  Attended Pre-op Education Class:  Video link not working  PCP: Aixa Morley MD  Date of PCP visit for H&P: 01/23/24  Is patient in a Pain Management program:  Review of Medical history reveals history of: HTN, Hypercholesterolemia, PKU, H/O melanoma in situ on back 2009    Critical Lab Values  - Hemoglobin (g/dL):  Date: 01/19/24 Value 14.4  - Hematocrit(%): Date: 01/19/24  Value 45.7  - HgbA1C:  Date: 01/19/24 Value 5.1  - Albumin:  Date: 01/19/24  Value 4.1  - BUN:  Date: 01/19/24  Value 12  - Creatinine:  Date: 01/19/24   Value 0.75    01/23/24 MRSA swab- negative    Coronary Artery Disease/HTN/CHF history: HTN  Does the patient see a Cardiologist: No  Date of most recent cardiac appt:  On any anticoagulation:  No    Diabetes History:  No  Most recent HgbA1C: 5.1  Pulmonary:  COPD/Emphysema/Use of home oxygen: No  Alcohol use: No    BMI greater than 40 at time of scheduling:  No  Additional medical concerns:  Additional recommendations for above concerns:  Attended Pre-Hab program:    Anticipated Discharge Disposition:  Home with [facility]  Who will be with patient at home following discharge:  She will have her brother, sister, and lots of friends helping her.  She will have help staying at her home for 5 nights.  Equipment patient already has: crutches   Bedroom on first or second floor:  first  Bathroom on first or second floor:  first  Weight bearing status:  wbat  Pre-op ambulatory status: painful ambulation  Number of entry steps:  She lives in a condo on the second floor- there is a flight of steps  Caregiver assistance:  full time    Dedra Blankenship RN  Date: 02/02/24

## 2024-02-06 ENCOUNTER — PREP FOR PROCEDURE (OUTPATIENT)
Dept: ORTHOPEDIC SURGERY | Age: 59
End: 2024-02-06

## 2024-02-06 DIAGNOSIS — Z01.818 PRE-OP TESTING: ICD-10-CM

## 2024-02-06 DIAGNOSIS — M17.11 PRIMARY OSTEOARTHRITIS OF RIGHT KNEE: Primary | ICD-10-CM

## 2024-02-07 RX ORDER — ACETAMINOPHEN 500 MG
1000 TABLET ORAL ONCE
Status: CANCELLED | OUTPATIENT
Start: 2024-02-13 | End: 2024-02-07

## 2024-02-07 RX ORDER — DEXAMETHASONE SODIUM PHOSPHATE 10 MG/ML
8 INJECTION, SOLUTION INTRAMUSCULAR; INTRAVENOUS ONCE
Status: CANCELLED | OUTPATIENT
Start: 2024-02-13 | End: 2024-02-07

## 2024-02-07 RX ORDER — GABAPENTIN 300 MG/1
300 CAPSULE ORAL ONCE
Status: CANCELLED | OUTPATIENT
Start: 2024-02-13 | End: 2024-02-07

## 2024-02-09 RX ORDER — CETIRIZINE HYDROCHLORIDE 5 MG/1
5 TABLET ORAL DAILY
COMMUNITY

## 2024-02-09 RX ORDER — LOSARTAN POTASSIUM AND HYDROCHLOROTHIAZIDE 12.5; 5 MG/1; MG/1
900 TABLET ORAL DAILY
COMMUNITY
Start: 2023-12-28

## 2024-02-09 RX ORDER — LOSARTAN POTASSIUM 25 MG/1
25 TABLET ORAL DAILY
COMMUNITY
Start: 2023-12-11 | End: 2024-02-09

## 2024-02-09 RX ORDER — EPINEPHRINE 0.3 MG/.3ML
0.3 INJECTION SUBCUTANEOUS
COMMUNITY
Start: 2024-01-31

## 2024-02-09 RX ORDER — ERGOCALCIFEROL 1.25 MG/1
50000 CAPSULE ORAL WEEKLY
COMMUNITY

## 2024-02-09 NOTE — PROGRESS NOTES
2/9/2024 1210 AM:  [facility] video emailed FROM OFFICE & reviewed to patient by BY THIS NURSE. DENIES ANY QUESTIONS. Hibiclens instructions reviewed to use x 5 days preop.  ERASTO screening done. TJ book, IS instructions, TJ video link, and fall contract placed on chart for DOS/TS

## 2024-02-09 NOTE — PROGRESS NOTES
2/9/2024 1210 AM:  PRESURGICAL BATHING INSTRUCTIONS  The [facility] takes many steps to prevent infections during surgery. One way is to provide   you with 4% Chlorhexidine Gluconate (CHG), a special antiseptic soap to wash your skin prior to   surgery. By thoroughly washing your skin, you can reduce the number of germs and help us   prevent an infection. Skin prep is a very important part of getting you ready for surgery. Please   follow the instructions listed below. Do NOT use if you have had an allergic reaction to CHG   previously.   Common Brand names:  Betasept, Hibiclens, Hibistat, Exidine, BioScrub, Aurora-Hex, Peridex, Clorostat      Showering Steps 5 Days Before Your Procedure:  Wash your hair, face and body using your regular soap and shampoo.    Rinse your hair and body thoroughly to remove any soap or shampoo residue.  Turn the water off to prevent rinsing the antiseptic soap (CHG) off too soon.    Wash the body gently for 5 minutes using a clean washcloth wet down with the CHG soap on it.    Apply the Chlorhexidine Gluconate (CHG) soap to your entire body only from the neck down.   Do not use on your face, eyes, ears, hair or genital area to avoid permanent injury to those areas.  Do not scrub the skin too hard. Wash thoroughly paying special attention to the area   where the surgery or procedure will be done.   Do not wash with regular soap once CHG is used.   Turn the water back on and rinse the body off thoroughly.  Pat yourself dry with a clean fresh towel after each shower for 5 days.   Put on clean clothes after each shower for the 5 days.  Do not put on lotions or powders after bathing.    If any kind of rash appears, stop use and contact your surgeon    A bottle of Chlorhexidine Gluconate CHG) can be purchased at most local pharmacy chain stores.   The soap may come in a liquid form, wipes or scrub brush applicator.  Any form is fine.  Remember   to use for 5 days prior to your surgery.

## 2024-02-09 NOTE — PROGRESS NOTES
2/9/2024 1147 AM:  PER [facility] AUDIT TRAIL, PROCEDURE PLACED ON [facility] SURGERY SCHEDULE  1/31/2024/TS    ORDERS/CONSENT ENTERED BY DR TRACY/TS

## 2024-02-09 NOTE — PROGRESS NOTES
2/9/2024 1208 PM:      [facility] PRE-SURGICAL TESTING INSTRUCTIONS                      PRIOR TO PROCEDURE DATE:    1. PLEASE FOLLOW ANY INSTRUCTIONS GIVEN TO YOU PER YOUR SURGEON.      2. Arrange for someone to drive you home and be with you for the first 24 hours after discharge for your safety after your procedure for which you received sedation. Ensure it is someone we can share information with regarding your discharge.     NOTE: At this time ONLY 2 ADULTS may accompany you. NO CHILDREN UNDER AGE OF 16.    One person ENCOURAGED to stay at hospital entire time if outpatient surgery      3. You must contact your surgeon for instructions IF:  You are taking any blood thinners, aspirin, anti-inflammatory or vitamins.   Contact your ordering physician/surgeon for medication instructions as soon as possible, especially if taking blood thinners, aspirin, heart, or diabetic medication. STOP SUPPLEMENTS/VITAMINS/NON-STEROIDAL ANTI-INFLAMMATORY MEDICATION 7 DAYS PRIOR TO PROCEDURE.  There is a change in your physical condition such as a cold, fever, rash, cuts, sores, or any other infection, especially near your surgical site.    4. Do not drink alcohol the day before or day of your procedure.  Do not use any recreational marijuana at least 24 hours or street drugs (heroin, cocaine) at minimum 5 days prior to your procedure.     5. A Pre-Surgical History and Physical MUST be completed WITHIN 30 DAYS OR LESS prior to your procedure.by your Physician or an Urgent Care        THE DAY OF YOUR PROCEDURE:  1.  Follow instructions for ARRIVAL TIME as DIRECTED BY YOUR SURGEON. [address] [address]     2. Enter the MAIN entrance from [facility] and follow the signs to the free Parking Garage or  Parking (offered free of charge 7 am-5pm).      3. Enter the Main Entrance of the hospital (do not enter from the lower level of the parking garage). Upon entrance, check in with

## 2024-02-09 NOTE — PROGRESS NOTES
2/9/2024 1209 PM:  TI COMPLETED/TS    CE 1/23/24, LABS IN [facility] 1/19/2024, EKG TRACING CALLED BRAIN [phone]/TS

## 2024-02-13 ENCOUNTER — TELEPHONE (OUTPATIENT)
Dept: ORTHOPEDIC SURGERY | Age: 59
End: 2024-02-13

## 2024-02-13 ENCOUNTER — HOSPITAL ENCOUNTER (OUTPATIENT)
Age: 59
Setting detail: OUTPATIENT SURGERY
Discharge: HOME | End: 2024-02-13
Attending: ORTHOPAEDIC SURGERY | Admitting: ORTHOPAEDIC SURGERY
Payer: COMMERCIAL

## 2024-02-13 ENCOUNTER — APPOINTMENT (OUTPATIENT)
Dept: GENERAL RADIOLOGY | Age: 59
End: 2024-02-13
Attending: ORTHOPAEDIC SURGERY
Payer: COMMERCIAL

## 2024-02-13 ENCOUNTER — ANESTHESIA (OUTPATIENT)
Dept: OPERATING ROOM | Age: 59
End: 2024-02-13
Payer: COMMERCIAL

## 2024-02-13 ENCOUNTER — ANESTHESIA EVENT (OUTPATIENT)
Dept: OPERATING ROOM | Age: 59
End: 2024-02-13
Payer: COMMERCIAL

## 2024-02-13 VITALS
RESPIRATION RATE: 12 BRPM | HEIGHT: 67 IN | BODY MASS INDEX: 30.23 KG/M2 | WEIGHT: 192.6 LBS | DIASTOLIC BLOOD PRESSURE: 78 MMHG | SYSTOLIC BLOOD PRESSURE: 136 MMHG | HEART RATE: 87 BPM | TEMPERATURE: 99 F | OXYGEN SATURATION: 98 %

## 2024-02-13 DIAGNOSIS — Z96.651 S/P TOTAL KNEE ARTHROPLASTY, RIGHT: Primary | ICD-10-CM

## 2024-02-13 LAB
ABO + RH BLD: NORMAL
BLD GP AB SCN SERPL QL: NORMAL
GLUCOSE BLD-MCNC: 99 MG/DL (ref 70–99)
INR PPP: 0.92 (ref 0.84–1.16)
PERFORMED ON: NORMAL
PROTHROMBIN TIME: 12.4 SEC (ref 11.5–14.8)

## 2024-02-13 PROCEDURE — 2580000003 HC RX 258: Performed by: ORTHOPAEDIC SURGERY

## 2024-02-13 PROCEDURE — 3600000005 HC SURGERY LEVEL 5 BASE: Performed by: ORTHOPAEDIC SURGERY

## 2024-02-13 PROCEDURE — 2580000003 HC RX 258: Performed by: ANESTHESIOLOGY

## 2024-02-13 PROCEDURE — 7100000001 HC PACU RECOVERY - ADDTL 15 MIN: Performed by: ORTHOPAEDIC SURGERY

## 2024-02-13 PROCEDURE — 6360000002 HC RX W HCPCS: Performed by: ORTHOPAEDIC SURGERY

## 2024-02-13 PROCEDURE — 7100000000 HC PACU RECOVERY - FIRST 15 MIN: Performed by: ORTHOPAEDIC SURGERY

## 2024-02-13 PROCEDURE — 97165 OT EVAL LOW COMPLEX 30 MIN: CPT

## 2024-02-13 PROCEDURE — 86850 RBC ANTIBODY SCREEN: CPT

## 2024-02-13 PROCEDURE — 97530 THERAPEUTIC ACTIVITIES: CPT

## 2024-02-13 PROCEDURE — 2500000003 HC RX 250 WO HCPCS: Performed by: ORTHOPAEDIC SURGERY

## 2024-02-13 PROCEDURE — 3600000015 HC SURGERY LEVEL 5 ADDTL 15MIN: Performed by: ORTHOPAEDIC SURGERY

## 2024-02-13 PROCEDURE — 97161 PT EVAL LOW COMPLEX 20 MIN: CPT

## 2024-02-13 PROCEDURE — 86900 BLOOD TYPING SEROLOGIC ABO: CPT

## 2024-02-13 PROCEDURE — 6360000002 HC RX W HCPCS: Performed by: ANESTHESIOLOGY

## 2024-02-13 PROCEDURE — 6360000002 HC RX W HCPCS: Performed by: STUDENT IN AN ORGANIZED HEALTH CARE EDUCATION/TRAINING PROGRAM

## 2024-02-13 PROCEDURE — 86901 BLOOD TYPING SEROLOGIC RH(D): CPT

## 2024-02-13 PROCEDURE — 7100000011 HC PHASE II RECOVERY - ADDTL 15 MIN: Performed by: ORTHOPAEDIC SURGERY

## 2024-02-13 PROCEDURE — 2709999900 HC NON-CHARGEABLE SUPPLY: Performed by: ORTHOPAEDIC SURGERY

## 2024-02-13 PROCEDURE — 6370000000 HC RX 637 (ALT 250 FOR IP): Performed by: ORTHOPAEDIC SURGERY

## 2024-02-13 PROCEDURE — C1713 ANCHOR/SCREW BN/BN,TIS/BN: HCPCS | Performed by: ORTHOPAEDIC SURGERY

## 2024-02-13 PROCEDURE — C1776 JOINT DEVICE (IMPLANTABLE): HCPCS | Performed by: ORTHOPAEDIC SURGERY

## 2024-02-13 PROCEDURE — 64447 NJX AA&/STRD FEMORAL NRV IMG: CPT | Performed by: ANESTHESIOLOGY

## 2024-02-13 PROCEDURE — 73560 X-RAY EXAM OF KNEE 1 OR 2: CPT

## 2024-02-13 PROCEDURE — A4217 STERILE WATER/SALINE, 500 ML: HCPCS | Performed by: ORTHOPAEDIC SURGERY

## 2024-02-13 PROCEDURE — 3700000000 HC ANESTHESIA ATTENDED CARE: Performed by: ORTHOPAEDIC SURGERY

## 2024-02-13 PROCEDURE — 97116 GAIT TRAINING THERAPY: CPT

## 2024-02-13 PROCEDURE — 85610 PROTHROMBIN TIME: CPT

## 2024-02-13 PROCEDURE — 97110 THERAPEUTIC EXERCISES: CPT

## 2024-02-13 PROCEDURE — 7100000010 HC PHASE II RECOVERY - FIRST 15 MIN: Performed by: ORTHOPAEDIC SURGERY

## 2024-02-13 PROCEDURE — 3700000001 HC ADD 15 MINUTES (ANESTHESIA): Performed by: ORTHOPAEDIC SURGERY

## 2024-02-13 PROCEDURE — 97535 SELF CARE MNGMENT TRAINING: CPT

## 2024-02-13 PROCEDURE — 2500000003 HC RX 250 WO HCPCS: Performed by: STUDENT IN AN ORGANIZED HEALTH CARE EDUCATION/TRAINING PROGRAM

## 2024-02-13 DEVICE — COMPONENT PAT DIA38MM POLYETH DOME CEM MEDIALIZED ATTUNE: Type: IMPLANTABLE DEVICE | Site: KNEE | Status: FUNCTIONAL

## 2024-02-13 DEVICE — CEMENT BNE 20ML 40GM FULL DOSE PMMA W/O ANTIBIO M VISC: Type: IMPLANTABLE DEVICE | Site: KNEE | Status: FUNCTIONAL

## 2024-02-13 DEVICE — IMPLANTABLE DEVICE: Type: IMPLANTABLE DEVICE | Site: KNEE | Status: FUNCTIONAL

## 2024-02-13 DEVICE — BASEPLATE TIB SZ 4 FIX BEAR CEM S+ TECHNOLOGY ATTUNE: Type: IMPLANTABLE DEVICE | Site: KNEE | Status: FUNCTIONAL

## 2024-02-13 DEVICE — KNEE K1 TOT HEMI STD CEM IMPL CAPPED SYNTHES: Type: IMPLANTABLE DEVICE | Site: KNEE | Status: FUNCTIONAL

## 2024-02-13 RX ORDER — PROCHLORPERAZINE EDISYLATE 5 MG/ML
5 INJECTION INTRAMUSCULAR; INTRAVENOUS
Status: DISCONTINUED | OUTPATIENT
Start: 2024-02-13 | End: 2024-02-13 | Stop reason: HOSPADM

## 2024-02-13 RX ORDER — SODIUM CHLORIDE 9 MG/ML
INJECTION, SOLUTION INTRAVENOUS PRN
Status: DISCONTINUED | OUTPATIENT
Start: 2024-02-13 | End: 2024-02-13 | Stop reason: HOSPADM

## 2024-02-13 RX ORDER — DEXAMETHASONE SODIUM PHOSPHATE 4 MG/ML
INJECTION, SOLUTION INTRA-ARTICULAR; INTRALESIONAL; INTRAMUSCULAR; INTRAVENOUS; SOFT TISSUE PRN
Status: DISCONTINUED | OUTPATIENT
Start: 2024-02-13 | End: 2024-02-13 | Stop reason: SDUPTHER

## 2024-02-13 RX ORDER — LIDOCAINE HYDROCHLORIDE 20 MG/ML
INJECTION, SOLUTION INTRAVENOUS PRN
Status: DISCONTINUED | OUTPATIENT
Start: 2024-02-13 | End: 2024-02-13 | Stop reason: SDUPTHER

## 2024-02-13 RX ORDER — FENTANYL CITRATE 50 UG/ML
25 INJECTION, SOLUTION INTRAMUSCULAR; INTRAVENOUS EVERY 5 MIN PRN
Status: DISCONTINUED | OUTPATIENT
Start: 2024-02-13 | End: 2024-02-13 | Stop reason: HOSPADM

## 2024-02-13 RX ORDER — OXYCODONE HYDROCHLORIDE AND ACETAMINOPHEN 5; 325 MG/1; MG/1
1 TABLET ORAL EVERY 6 HOURS PRN
Qty: 28 TABLET | Refills: 0 | Status: SHIPPED | OUTPATIENT
Start: 2024-02-13 | End: 2024-02-20

## 2024-02-13 RX ORDER — ONDANSETRON 2 MG/ML
INJECTION INTRAMUSCULAR; INTRAVENOUS PRN
Status: DISCONTINUED | OUTPATIENT
Start: 2024-02-13 | End: 2024-02-13 | Stop reason: SDUPTHER

## 2024-02-13 RX ORDER — PROPOFOL 10 MG/ML
INJECTION, EMULSION INTRAVENOUS PRN
Status: DISCONTINUED | OUTPATIENT
Start: 2024-02-13 | End: 2024-02-13 | Stop reason: SDUPTHER

## 2024-02-13 RX ORDER — HYDROMORPHONE HYDROCHLORIDE 2 MG/ML
INJECTION, SOLUTION INTRAMUSCULAR; INTRAVENOUS; SUBCUTANEOUS PRN
Status: DISCONTINUED | OUTPATIENT
Start: 2024-02-13 | End: 2024-02-13 | Stop reason: SDUPTHER

## 2024-02-13 RX ORDER — FENTANYL CITRATE 50 UG/ML
INJECTION, SOLUTION INTRAMUSCULAR; INTRAVENOUS PRN
Status: DISCONTINUED | OUTPATIENT
Start: 2024-02-13 | End: 2024-02-13 | Stop reason: SDUPTHER

## 2024-02-13 RX ORDER — CELECOXIB 200 MG/1
200 CAPSULE ORAL DAILY
Qty: 60 CAPSULE | Refills: 0 | Status: SHIPPED | OUTPATIENT
Start: 2024-02-13

## 2024-02-13 RX ORDER — ROPIVACAINE HYDROCHLORIDE 5 MG/ML
INJECTION, SOLUTION EPIDURAL; INFILTRATION; PERINEURAL
Status: COMPLETED | OUTPATIENT
Start: 2024-02-13 | End: 2024-02-13

## 2024-02-13 RX ORDER — ASPIRIN 81 MG/1
81 TABLET ORAL 2 TIMES DAILY
Qty: 60 TABLET | Refills: 0 | Status: SHIPPED | OUTPATIENT
Start: 2024-02-13 | End: 2024-03-14

## 2024-02-13 RX ORDER — ONDANSETRON 4 MG/1
4 TABLET, ORALLY DISINTEGRATING ORAL 3 TIMES DAILY PRN
Qty: 15 TABLET | Refills: 0 | Status: SHIPPED | OUTPATIENT
Start: 2024-02-13 | End: 2024-02-18

## 2024-02-13 RX ORDER — GLYCOPYRROLATE 0.2 MG/ML
INJECTION INTRAMUSCULAR; INTRAVENOUS PRN
Status: DISCONTINUED | OUTPATIENT
Start: 2024-02-13 | End: 2024-02-13 | Stop reason: SDUPTHER

## 2024-02-13 RX ORDER — PHENYLEPHRINE HYDROCHLORIDE 10 MG/ML
INJECTION INTRAVENOUS PRN
Status: DISCONTINUED | OUTPATIENT
Start: 2024-02-13 | End: 2024-02-13 | Stop reason: SDUPTHER

## 2024-02-13 RX ORDER — ONDANSETRON 2 MG/ML
4 INJECTION INTRAMUSCULAR; INTRAVENOUS
Status: DISCONTINUED | OUTPATIENT
Start: 2024-02-13 | End: 2024-02-13 | Stop reason: HOSPADM

## 2024-02-13 RX ORDER — VANCOMYCIN HYDROCHLORIDE 1 G/20ML
INJECTION, POWDER, LYOPHILIZED, FOR SOLUTION INTRAVENOUS PRN
Status: DISCONTINUED | OUTPATIENT
Start: 2024-02-13 | End: 2024-02-13 | Stop reason: ALTCHOICE

## 2024-02-13 RX ORDER — DEXAMETHASONE SODIUM PHOSPHATE 10 MG/ML
8 INJECTION, SOLUTION INTRAMUSCULAR; INTRAVENOUS ONCE
Status: COMPLETED | OUTPATIENT
Start: 2024-02-13 | End: 2024-02-13

## 2024-02-13 RX ORDER — MIDAZOLAM HYDROCHLORIDE 1 MG/ML
INJECTION INTRAMUSCULAR; INTRAVENOUS PRN
Status: DISCONTINUED | OUTPATIENT
Start: 2024-02-13 | End: 2024-02-13 | Stop reason: SDUPTHER

## 2024-02-13 RX ORDER — ACETAMINOPHEN 325 MG/1
650 TABLET ORAL
Status: DISCONTINUED | OUTPATIENT
Start: 2024-02-13 | End: 2024-02-13 | Stop reason: HOSPADM

## 2024-02-13 RX ORDER — ROPIVACAINE HYDROCHLORIDE 5 MG/ML
INJECTION, SOLUTION EPIDURAL; INFILTRATION; PERINEURAL
Status: COMPLETED
Start: 2024-02-13 | End: 2024-02-13

## 2024-02-13 RX ORDER — SODIUM CHLORIDE, SODIUM LACTATE, POTASSIUM CHLORIDE, CALCIUM CHLORIDE 600; 310; 30; 20 MG/100ML; MG/100ML; MG/100ML; MG/100ML
INJECTION, SOLUTION INTRAVENOUS CONTINUOUS
Status: DISCONTINUED | OUTPATIENT
Start: 2024-02-13 | End: 2024-02-13 | Stop reason: HOSPADM

## 2024-02-13 RX ORDER — HYDROMORPHONE HYDROCHLORIDE 1 MG/ML
0.5 INJECTION, SOLUTION INTRAMUSCULAR; INTRAVENOUS; SUBCUTANEOUS EVERY 5 MIN PRN
Status: DISCONTINUED | OUTPATIENT
Start: 2024-02-13 | End: 2024-02-13 | Stop reason: HOSPADM

## 2024-02-13 RX ORDER — METHOCARBAMOL 100 MG/ML
INJECTION, SOLUTION INTRAMUSCULAR; INTRAVENOUS PRN
Status: DISCONTINUED | OUTPATIENT
Start: 2024-02-13 | End: 2024-02-13 | Stop reason: SDUPTHER

## 2024-02-13 RX ORDER — SODIUM CHLORIDE 0.9 % (FLUSH) 0.9 %
5-40 SYRINGE (ML) INJECTION EVERY 12 HOURS SCHEDULED
Status: DISCONTINUED | OUTPATIENT
Start: 2024-02-13 | End: 2024-02-13 | Stop reason: HOSPADM

## 2024-02-13 RX ORDER — SODIUM CHLORIDE 0.9 % (FLUSH) 0.9 %
5-40 SYRINGE (ML) INJECTION PRN
Status: DISCONTINUED | OUTPATIENT
Start: 2024-02-13 | End: 2024-02-13 | Stop reason: HOSPADM

## 2024-02-13 RX ORDER — LABETALOL HYDROCHLORIDE 5 MG/ML
10 INJECTION, SOLUTION INTRAVENOUS
Status: DISCONTINUED | OUTPATIENT
Start: 2024-02-13 | End: 2024-02-13 | Stop reason: HOSPADM

## 2024-02-13 RX ORDER — GABAPENTIN 300 MG/1
300 CAPSULE ORAL ONCE
Status: COMPLETED | OUTPATIENT
Start: 2024-02-13 | End: 2024-02-13

## 2024-02-13 RX ORDER — ROCURONIUM BROMIDE 10 MG/ML
INJECTION, SOLUTION INTRAVENOUS PRN
Status: DISCONTINUED | OUTPATIENT
Start: 2024-02-13 | End: 2024-02-13 | Stop reason: SDUPTHER

## 2024-02-13 RX ORDER — MAGNESIUM HYDROXIDE 1200 MG/15ML
LIQUID ORAL CONTINUOUS PRN
Status: DISCONTINUED | OUTPATIENT
Start: 2024-02-13 | End: 2024-02-13 | Stop reason: HOSPADM

## 2024-02-13 RX ORDER — ACETAMINOPHEN 500 MG
1000 TABLET ORAL ONCE
Status: COMPLETED | OUTPATIENT
Start: 2024-02-13 | End: 2024-02-13

## 2024-02-13 RX ORDER — IPRATROPIUM BROMIDE AND ALBUTEROL SULFATE 2.5; .5 MG/3ML; MG/3ML
1 SOLUTION RESPIRATORY (INHALATION)
Status: DISCONTINUED | OUTPATIENT
Start: 2024-02-13 | End: 2024-02-13 | Stop reason: HOSPADM

## 2024-02-13 RX ORDER — SENNA AND DOCUSATE SODIUM 50; 8.6 MG/1; MG/1
1 TABLET, FILM COATED ORAL DAILY
Qty: 7 TABLET | Refills: 0 | Status: SHIPPED | OUTPATIENT
Start: 2024-02-13 | End: 2024-02-20

## 2024-02-13 RX ADMIN — LIDOCAINE HYDROCHLORIDE 100 MG: 20 INJECTION, SOLUTION INTRAVENOUS at 08:07

## 2024-02-13 RX ADMIN — PHENYLEPHRINE HYDROCHLORIDE 100 MCG: 10 INJECTION INTRAVENOUS at 08:53

## 2024-02-13 RX ADMIN — ROCURONIUM BROMIDE 50 MG: 10 INJECTION, SOLUTION INTRAVENOUS at 08:10

## 2024-02-13 RX ADMIN — ONDANSETRON 4 MG: 2 INJECTION INTRAMUSCULAR; INTRAVENOUS at 08:21

## 2024-02-13 RX ADMIN — ACETAMINOPHEN 1000 MG: 500 TABLET ORAL at 07:21

## 2024-02-13 RX ADMIN — TRANEXAMIC ACID 1000 MG: 100 INJECTION, SOLUTION INTRAVENOUS at 09:32

## 2024-02-13 RX ADMIN — PHENYLEPHRINE HYDROCHLORIDE 100 MCG: 10 INJECTION INTRAVENOUS at 09:38

## 2024-02-13 RX ADMIN — DEXAMETHASONE SODIUM PHOSPHATE 8 MG: 10 INJECTION INTRAMUSCULAR; INTRAVENOUS at 07:46

## 2024-02-13 RX ADMIN — DEXAMETHASONE SODIUM PHOSPHATE 4 MG: 4 INJECTION, SOLUTION INTRAMUSCULAR; INTRAVENOUS at 08:17

## 2024-02-13 RX ADMIN — PHENYLEPHRINE HYDROCHLORIDE 50 MCG: 10 INJECTION INTRAVENOUS at 09:34

## 2024-02-13 RX ADMIN — METHOCARBAMOL 1000 MG: 100 INJECTION, SOLUTION INTRAMUSCULAR; INTRAVENOUS at 08:15

## 2024-02-13 RX ADMIN — PROPOFOL 150 MG: 10 INJECTION, EMULSION INTRAVENOUS at 08:09

## 2024-02-13 RX ADMIN — VANCOMYCIN HYDROCHLORIDE 1500 MG: 10 INJECTION, POWDER, LYOPHILIZED, FOR SOLUTION INTRAVENOUS at 07:52

## 2024-02-13 RX ADMIN — TRANEXAMIC ACID 1000 MG: 100 INJECTION, SOLUTION INTRAVENOUS at 08:16

## 2024-02-13 RX ADMIN — GLYCOPYRROLATE 0.2 MG: 0.2 INJECTION INTRAMUSCULAR; INTRAVENOUS at 08:35

## 2024-02-13 RX ADMIN — ROPIVACAINE HYDROCHLORIDE 30 ML: 5 INJECTION, SOLUTION EPIDURAL; INFILTRATION; PERINEURAL at 07:48

## 2024-02-13 RX ADMIN — HYDROMORPHONE HYDROCHLORIDE 1 MG: 2 INJECTION, SOLUTION INTRAMUSCULAR; INTRAVENOUS; SUBCUTANEOUS at 08:19

## 2024-02-13 RX ADMIN — FENTANYL CITRATE 100 MCG: 50 INJECTION, SOLUTION INTRAMUSCULAR; INTRAVENOUS at 07:49

## 2024-02-13 RX ADMIN — PHENYLEPHRINE HYDROCHLORIDE 50 MCG: 10 INJECTION INTRAVENOUS at 09:22

## 2024-02-13 RX ADMIN — PHENYLEPHRINE HYDROCHLORIDE 100 MCG: 10 INJECTION INTRAVENOUS at 09:40

## 2024-02-13 RX ADMIN — PHENYLEPHRINE HYDROCHLORIDE 50 MCG: 10 INJECTION INTRAVENOUS at 08:37

## 2024-02-13 RX ADMIN — GABAPENTIN 300 MG: 300 CAPSULE ORAL at 07:21

## 2024-02-13 RX ADMIN — SODIUM CHLORIDE 2000 MG: 900 INJECTION INTRAVENOUS at 08:15

## 2024-02-13 RX ADMIN — PHENYLEPHRINE HYDROCHLORIDE 50 MCG: 10 INJECTION INTRAVENOUS at 08:36

## 2024-02-13 RX ADMIN — SUGAMMADEX 200 MG: 100 INJECTION, SOLUTION INTRAVENOUS at 10:23

## 2024-02-13 RX ADMIN — MIDAZOLAM HYDROCHLORIDE 2 MG: 2 INJECTION, SOLUTION INTRAMUSCULAR; INTRAVENOUS at 07:49

## 2024-02-13 RX ADMIN — SODIUM CHLORIDE, POTASSIUM CHLORIDE, SODIUM LACTATE AND CALCIUM CHLORIDE: 600; 310; 30; 20 INJECTION, SOLUTION INTRAVENOUS at 07:45

## 2024-02-13 RX ADMIN — PHENYLEPHRINE HYDROCHLORIDE 200 MCG: 10 INJECTION INTRAVENOUS at 09:56

## 2024-02-13 NOTE — ANESTHESIA PROCEDURE NOTES
Peripheral Block    Patient location during procedure: pre-op  Reason for block: post-op pain management and at surgeon's request  Start time: 2/13/2024 7:48 AM  End time: 2/13/2024 7:54 AM  Staffing  Performed: resident/CRNA   Anesthesiologist: Carroll Witt MD  Resident/CRNA: Fidelia Avina APRN - CRNA  Performed by: Fidelia Avina APRN - CRNA  Authorized by: Carroll Witt MD    Preanesthetic Checklist  Completed: patient identified, IV checked, site marked, risks and benefits discussed, surgical/procedural consents, equipment checked, pre-op evaluation, timeout performed, anesthesia consent given, oxygen available, monitors applied/VS acknowledged, fire risk safety assessment completed and verbalized and blood product R/B/A discussed and consented  Peripheral Block   Patient position: supine  Prep: ChloraPrep  Provider prep: mask and sterile gloves  Patient monitoring: cardiac monitor, continuous pulse ox, continuous capnometry, frequent blood pressure checks, IV access, oxygen and responsive to questions  Block type: Femoral  Adductor canal  Laterality: right  Injection technique: single-shot  Guidance: ultrasound guided    Needle   Needle type: insulated echogenic nerve stimulator needle   Needle gauge: 20 G  Needle localization: anatomical landmarks and ultrasound guidance  Needle length: 8 cm  Assessment   Injection assessment: negative aspiration for heme, no paresthesia on injection, local visualized surrounding nerve on ultrasound and no intravascular symptoms  Paresthesia pain: none  Slow fractionated injection: yes  Hemodynamics: stable  Real-time US image taken/store: yes  Outcomes: uncomplicated    Additional Notes  Time Out: 0748  Medications Administered  ropivacaine (NAROPIN) injection 0.5% - Perineural   30 mL - 2/13/2024 7:48:00 AM

## 2024-02-13 NOTE — ANESTHESIA PRE PROCEDURE
Department of Anesthesiology  Preprocedure Note       Name:  Maureen Jade   Age:  58 y.o.  :  1965                                          MRN:  [id number]         Date:  2024      Surgeon: Surgeon(s):  Calvin Del Rio MD    Procedure: Procedure(s):  RIGHT TOTAL KNEE REPLACEMENT    Medications prior to admission:   Prior to Admission medications    Medication Sig Start Date End Date Taking? Authorizing Provider   aspirin 81 MG EC tablet Take 1 tablet by mouth in the morning and at bedtime 2/13/24 3/14/24 Yes Adalid Vitale DO   oxyCODONE-acetaminophen (PERCOCET) 5-325 MG per tablet Take 1 tablet by mouth every 6 hours as needed for Pain for up to 7 days. Intended supply: 7 days. Take lowest dose possible to manage pain Max Daily Amount: 4 tablets 24 Yes Adalid Vitale DO   celecoxib (CELEBREX) 200 MG capsule Take 1 capsule by mouth daily 24  Yes Adalid Vitale DO   ondansetron (ZOFRAN-ODT) 4 MG disintegrating tablet Take 1 tablet by mouth 3 times daily as needed for Nausea or Vomiting 24 Yes Adalid Vitale DO   sennosides-docusate sodium (SENOKOT-S) 8.6-50 MG tablet Take 1 tablet by mouth daily for 7 days 24 Yes Adalid Vitale DO   losartan-hydroCHLOROthiazide (HYZAAR) 50-12.5 MG per tablet Take 900 tablets by mouth daily 23  Yes ProviderNayan MD   EPINEPHrine (EPIPEN) 0.3 MG/0.3ML SOAJ injection Inject 0.3 mLs into the muscle 24  Yes ProviderNayan MD   vitamin D (ERGOCALCIFEROL) 1.25 MG (91173 UT) CAPS capsule Take 1 capsule by mouth once a week   Yes Nayan Herzog MD   cetirizine (ZYRTEC) 5 MG tablet Take 1 tablet by mouth daily   Yes Nayan Herzog MD   Pegvaliase-pqpz 20 MG/ML SOSY Inject 20 mg into the skin daily (PALYNIZIQ) TAKES 3 TIMES A WEEK    ProviderNayan MD       Current medications:    Current Facility-Administered Medications   Medication Dose Route Frequency Provider Last Rate

## 2024-02-13 NOTE — PROGRESS NOTES
Occupational Therapy  Facility/Department: [facility] GENERAL SURGERY  Occupational Therapy Initial Assessment/Discharge    Name: Maureen Jade  : 1965  MRN: [id number]  Date of Service: 2024    Discharge Recommendations:  24 hour supervision or assist (has plans for home PT services)  OT Equipment Recommendations  Equipment Needed: No  Other: 2WW issued    Patient Diagnosis(es): The encounter diagnosis was S/P total knee arthroplasty, right.  Past Medical History:  has a past medical history of Cancer (HCC), Osteoarthritis, PKU (phenylketonuria) (HCC), and Right knee pain.  Past Surgical History:  has a past surgical history that includes Thyroid surgery (Right, ); joint replacement (Left); Skin cancer excision (); Shoulder Arthroplasty (Right, 2020); Ankle surgery (Right, ); eye surgery (Cataract); Ankle fracture surgery (1987); knee surgery (2009); shoulder surgery; and Knee arthroscopy (2003).    Assessment   Assessment: Pt doing well POD #0 following R TKA. Pt performing all fx mobility with 2WW and GB with CGA. Pt issued 2WW in PACU. Pt has recommended 24hrA in home setting at discharge. Pt educated on following up with home health care for initial PT evaluation as patient stated she hadn't scheduled this yet. Pt educated on safety with bathing, toileting, and showering. Pt plans to d/c home today.  Prognosis: Good  Decision Making: Low Complexity  REQUIRES OT FOLLOW-UP: No  Activity Tolerance  Activity Tolerance: Patient Tolerated treatment well        Plan   Occupational Therapy Plan  Times Per Week: d/c     Restrictions  Position Activity Restriction  Other position/activity restrictions: WBAT s/p R TKA    Subjective   General  Chart Reviewed: Yes  Patient assessed for rehabilitation services?: Yes  Additional Pertinent Hx: Pt admitted for elective RIGHT TOTAL KNEE REPLACEMENT from Osteoarthritis of right knee, unspecified osteoarthritis type with surgeon: Calvin Del Rio,

## 2024-02-13 NOTE — DISCHARGE INSTRUCTIONS
NAUSEA  Blood soaked dressing.  (Some oozing may be normal)  Inability to urinate      Numb, pale, blue, cold or tingling extremity      Physician:  Dr Del Rio    The above instructions were reviewed with patient/significant other.  The following additional patient specific information was reviewed with the patient/significant other:  [x]Procedure/physician specific instructions  []Medication information sheet(S) including potential side effects  []Sharyn’s egress test  []Pain Ball management  []FAQ Catheter associated blood stream infections  [x]FAQ Surgical Site Infections  [x]Other- Nerve block information    I have read and understand the instructions given to me: ____________________________________________   (Patient/S.O. Signature)            Date/time 2/13/2024 10:12 AM                 If you smoke STOP. We care about your health!

## 2024-02-13 NOTE — TELEPHONE ENCOUNTER
Left vm informing ref for home health physical therapy has been sent to [place] [facility] should hear from them sometime tomorrow.

## 2024-02-13 NOTE — PROGRESS NOTES
PACU Transfer to [facility]    Vitals:    02/13/24 1145   BP: 130/87   Pulse: 79   Resp: 12   Temp: 98 °F (36.7 °C)   SpO2: 96%         Intake/Output Summary (Last 24 hours) at 2/13/2024 1234  Last data filed at 2/13/2024 1130  Gross per 24 hour   Intake 1467 ml   Output 0 ml   Net 1467 ml       Pain assessment:  none  Pain Level: 0    Patient transferred to care of [facility] RN.    2/13/2024 12:34 PM

## 2024-02-13 NOTE — BRIEF OP NOTE
Brief Postoperative Note      Patient: Maureen Jade  YOB: 1965  MRN: [id number]    Date of Procedure: 2/13/2024    Pre-Op Diagnosis Codes:     * Osteoarthritis of right knee, unspecified osteoarthritis type [M17.11]    Post-Op Diagnosis: Same       Procedure(s):  RIGHT TOTAL KNEE REPLACEMENT    Surgeon(s):  Calvin Del Rio MD    Assistant:  Surgical Assistant: Nafisa Márquez  Fellow: Adalid Vitale DO    Anesthesia: General    Estimated Blood Loss (mL): less than 100     Complications: None    Specimens:   * No specimens in log *    Implants:  * No implants in log *      Drains: * No LDAs found *    Findings: Left knee OA      Electronically signed by Adalid Vitale DO on 2/13/2024 at 10:09 AM

## 2024-02-13 NOTE — TELEPHONE ENCOUNTER
General Question     Subject: R KNEE PT FOLLOW UP   Patient and /or Facility Request: Maureen Jade  Contact Number: [phone]    PATIENT CALLED IN TO SEE IF SHE CAN SPEAK TO SOMEONE IN THE OFFICE ABOUT HER APPT WITH PHYSICAL THERAPY COMING TO HER HOUSE TOMORROW..    PLEASE ADVISE

## 2024-02-13 NOTE — PROGRESS NOTES
1033 Admitted to PACU from OR. Connected to monitor. Report at bedside. Patient awake denies pain and nausea. Able to raise right leg off bed. Has full sensation in right foot.

## 2024-02-13 NOTE — PROGRESS NOTES
Physical Therapy  Facility/Department: [facility] GENERAL SURGERY  Physical Therapy Initial Assessment, Treatment and D/c    Name: Maureen Jade  : 1965  MRN: [id number]  Date of Service: 2024    Discharge Recommendations:  24 hour supervision or assist (PT)   PT Equipment Recommendations  Mobility Devices: Walker  Walker: Rolling      Patient Diagnosis(es): The encounter diagnosis was S/P total knee arthroplasty, right.  Past Medical History:  has a past medical history of Cancer (HCC), Osteoarthritis, PKU (phenylketonuria) (HCC), and Right knee pain.  Past Surgical History:  has a past surgical history that includes Thyroid surgery (Right, ); joint replacement (Left); Skin cancer excision (); Shoulder Arthroplasty (Right, 2020); Ankle surgery (Right, ); eye surgery (Cataract); Ankle fracture surgery (1987); knee surgery (2009); shoulder surgery; and Knee arthroscopy (2003).    Assessment   Assessment: Pt is 57 yo F s/p R TKR.  Pt moving well using wheeled walker.  Pt issued wheeled walker for home use, as she needs one.  Rec return home with arranged 24 hour assist and continued PT.  D/c PT.  Therapy Prognosis: Good  Decision Making: Low Complexity  Requires PT Follow-Up: Yes  Activity Tolerance  Activity Tolerance: Patient tolerated treatment well     Plan   Safety Devices  Type of Devices: Nurse notified, Left in bed (In PACU)  Restraints  Restraints Initially in Place: No     Restrictions  Position Activity Restriction  Other position/activity restrictions: WBAT s/p R TKA     Subjective   General  Chart Reviewed: Yes  Patient assessed for rehabilitation services?: Yes  Additional Pertinent Hx: Pt is 57 yo F s/p R TKR  Family / Caregiver Present: No  Referring Practitioner: Dr. Vitale  Diagnosis: s/p R TKR  Subjective  Subjective: Pt supine in bed and agreeable to PT.  Denies pain.         Social/Functional History  Social/Functional History  Lives With: Alone  Type of Home:

## 2024-02-13 NOTE — INTERVAL H&P NOTE
Update History & Physical    The patient's History and Physical of January 22, 2024 was reviewed with the patient and I examined the patient. There was no change. The surgical site was confirmed by the patient and me.     Plan: The risks, benefits, expected outcome, and alternative to the recommended procedure have been discussed with the patient. Patient understands and wants to proceed with the procedure.     Electronically signed by Adalid Vitale DO on 2/13/2024 at 6:38 AM

## 2024-02-13 NOTE — PROGRESS NOTES
Ambulatory Surgery/Procedure Discharge Note    Vitals:    02/13/24 1230   BP: 136/78   Pulse: 87   Resp: 12   Temp: 99 °F (37.2 °C)   SpO2: 98%       In: 1587 [P.O.:360; I.V.:1117]  Out: 0     Restroom use offered before discharge.  Yes    Pain assessment:  none  Pain Level: 0    Pt and sister states \"ready to go home\". Pt alert and oriented x4. IV removed. Denies N/V or pain. Right leg dressing-ACE wrap-C,D,I.  Pt tolerating PO intake. Discharge instructions given to pt and sister with pt permission. Pt and sister verbalized understanding of all instructions. Left with all belongings, walker, and discharge instructions. Sister to  prescriptions from outpatient pharmacy.     Patient discharged to home/self care. Patient discharged via wheel chair by transporter to waiting family.    2/13/2024 1:21 PM

## 2024-02-13 NOTE — ANESTHESIA POSTPROCEDURE EVALUATION
Department of Anesthesiology  Postprocedure Note    Patient: Maureen Jade  MRN: [id number]  YOB: 1965  Date of evaluation: 2/13/2024    Procedure Summary       Date: 02/13/24 Room / Location: [address]    Anesthesia Start: 0804 Anesthesia Stop: 1035    Procedure: RIGHT TOTAL KNEE REPLACEMENT (Right: Knee) Diagnosis:       Osteoarthritis of right knee, unspecified osteoarthritis type      (Osteoarthritis of right knee, unspecified osteoarthritis type [M17.11])    Surgeons: Calvin Del Rio MD Responsible Provider: Carroll Witt MD    Anesthesia Type: general, regional ASA Status: 2            Anesthesia Type: No value filed.    Rajesh Phase I: Rajesh Score: 9    Rajesh Phase II:      Anesthesia Post Evaluation    Patient location during evaluation: PACU  Patient participation: complete - patient participated  Level of consciousness: awake  Pain score: 0  Nausea & Vomiting: no nausea and no vomiting  Cardiovascular status: blood pressure returned to baseline  Respiratory status: acceptable  Hydration status: euvolemic  Pain management: adequate    No notable events documented.

## 2024-02-14 ENCOUNTER — TELEPHONE (OUTPATIENT)
Dept: ORTHOPEDIC SURGERY | Age: 59
End: 2024-02-14

## 2024-02-14 NOTE — TELEPHONE ENCOUNTER
Called patient for postoperative evaluation after Rt TKA on 02/13/24 with Dr. Del Rio.  Unable to reach patient, unable to leave message. She did speak with Dr. Del Rio's office earlier today, and is doing well.    Dedra Blankenship  Ortho Nurse Navigator  [phone]

## 2024-02-14 NOTE — TELEPHONE ENCOUNTER
SPOKE WITH PATIENT IS DOING GREAT  PAIN IS WELL CONTROLLED  REF TO [facility] FOR HOME HEALTH PHYSICAL THERAPY WAS SENT IN YESTERDAY  INSTRUCTED IF DOESN'T HEAR BY TOMORROW TO CONTACT OFFICE BACK  SHE IS DOING EXERCISES HOSPITAL INSTRUCTED HER ON  SHE WILL CONTACT THE OFFICE WITH ANY QUESTIONS OR CONCERNS

## 2024-02-23 ENCOUNTER — OFFICE VISIT (OUTPATIENT)
Dept: ORTHOPEDIC SURGERY | Age: 59
End: 2024-02-23

## 2024-02-23 VITALS — BODY MASS INDEX: 29.82 KG/M2 | HEIGHT: 67 IN | WEIGHT: 190 LBS

## 2024-02-23 DIAGNOSIS — Z96.651 S/P TOTAL KNEE ARTHROPLASTY, RIGHT: Primary | ICD-10-CM

## 2024-02-23 NOTE — PROGRESS NOTES
Chief Complaint  Follow-up (S/p 10 days right TKR)      History of Present Illness:  Maureen Jade is a pleasant 58 y.o. female who presents today for follow up evaluation of right knee. She is 10 days status post right total knee arthroplasty on 2/13/2024. She has been compliant with post operative physical therapy at home. Pain is controlled, she is no longer taking pain meds, taking Celebrex and Tylenol. Denies fevers or chills. She is ambulating with a cane.    Medical History:  Patient's medications, allergies, past medical, surgical, social and family histories were reviewed and updated as appropriate.    Pertinent items are noted in HPI  Review of systems reviewed from Patient History Form completed today and available in the patient's chart under the Media tab.              Past Medical History:   Diagnosis Date    Cancer (HCC) 2009    MELANOMA INSITU ON BACK    Osteoarthritis     PKU (phenylketonuria) (HCC)     Right knee pain         Past Surgical History:   Procedure Laterality Date    ANKLE FRACTURE SURGERY  6/1987    ANKLE SURGERY Right 1992    spur removal    EYE SURGERY  Cataract    2022    JOINT REPLACEMENT Left     partial knee    KNEE ARTHROSCOPY  8/2003    KNEE SURGERY  12/2009    SHOULDER ARTHROPLASTY Right 05/19/2020    RIGHT SHOULDER ARTHROTOMY, REMOVAL OF LOOSE BODIES, OPEN BICEPS TENODESIS, ANATOMIC TOTAL SHOULDER ARTHROPLASTY performed by Jose F Mcnulty MD at [facility] OR    SHOULDER SURGERY      SKIN CANCER EXCISION  2009    left upper back    THYROID SURGERY Right 2001    right  THYROID FOR CYST REMOVED    TOTAL KNEE ARTHROPLASTY Right 2/13/2024    RIGHT TOTAL KNEE REPLACEMENT performed by Calvin Del Rio MD at [facility] OR       Family History   Problem Relation Age of Onset    High Blood Pressure Father     No Known Problems Sister     Cancer Mother         lung     High Blood Pressure Mother     Other Brother         PKU    Stroke Maternal Grandmother     Cancer Maternal Grandfather

## 2024-02-26 ENCOUNTER — TELEPHONE (OUTPATIENT)
Dept: ORTHOPEDIC SURGERY | Age: 59
End: 2024-02-26

## 2024-02-26 NOTE — TELEPHONE ENCOUNTER
Signed off from patient.  She had her first PO appointment on 02/23/24.  Patient will call RN or Surgeon's office with any issues or concerns.

## 2024-03-01 ENCOUNTER — HOSPITAL ENCOUNTER (OUTPATIENT)
Dept: PHYSICAL THERAPY | Age: 59
Setting detail: THERAPIES SERIES
Discharge: HOME | End: 2024-03-01
Payer: COMMERCIAL

## 2024-03-01 DIAGNOSIS — M25.661 KNEE STIFFNESS, RIGHT: ICD-10-CM

## 2024-03-01 DIAGNOSIS — R60.0 LOCALIZED EDEMA: ICD-10-CM

## 2024-03-01 DIAGNOSIS — M25.561 RIGHT KNEE PAIN, UNSPECIFIED CHRONICITY: Primary | ICD-10-CM

## 2024-03-01 PROCEDURE — 97161 PT EVAL LOW COMPLEX 20 MIN: CPT

## 2024-03-01 PROCEDURE — 97110 THERAPEUTIC EXERCISES: CPT

## 2024-03-01 PROCEDURE — 97016 VASOPNEUMATIC DEVICE THERAPY: CPT

## 2024-03-01 NOTE — PLAN OF CARE
tolerance, in order to progress toward full function and prevent re-injury.    Status: [] Progressing: [] Met: [] Not Met: [] Adjusted  Patient will have a decrease in pain to 1/10 to help facilitate improvement in movement, function, and ADLs as indicated by functional deficits.   Status: [] Progressing: [] Met: [] Not Met: [] Adjusted    Long Term Goals: To be achieved in: 8-10 weeks  Disability index score of 20% or less for the WOMAC to assist with return top prior level of function.   Status: [] Progressing: [] Met: [] Not Met: [] Adjusted  Improve knee AROM to flexion 125-135 degrees or  better to allow for proper joint functioning as indicated by patients functional deficits.  Status: [] Progressing: [] Met: [] Not Met: [] Adjusted  Pt to improve strength to 4+/5 or better of proximal hip, quadriceps, and hamstrings to allow for proper muscle and joint use in functional mobility, ADLs and prior level of function   Status: [] Progressing: [] Met: [] Not Met: [] Adjusted  Patient will return to  up/down 1 flight of stairs  without increased symptoms or restriction to work towards return to prior level of function.        Status: [] Progressing: [] Met: [] Not Met: [] Adjusted  Patient will resume normal leisure activities including walking 2 blocks without pain.         Status: [] Progressing: [] Met: [] Not Met: [] Adjusted    TREATMENT PLAN     Frequency/Duration: 2x/week for 8-10 weeks for the following treatment interventions:    Interventions:  [x] Therapeutic exercise including: strength training, ROM, including postural re-education.   [x] NMR activation and proprioception, including postural re-education.    [x] Manual therapy as indicated to include: PROM, Gr I-IV mobilizations, STM, and Dry Needling/IASTM  [x] Modalities as needed that may include: Cryotherapy, Electrical Stimulation, Biofeedback, and Vasoneumatic Compression  [x] Patient education on joint protection, postural re-education, activity

## 2024-03-05 ENCOUNTER — HOSPITAL ENCOUNTER (OUTPATIENT)
Dept: PHYSICAL THERAPY | Age: 59
Setting detail: THERAPIES SERIES
Discharge: HOME | End: 2024-03-05
Payer: COMMERCIAL

## 2024-03-05 PROCEDURE — 97110 THERAPEUTIC EXERCISES: CPT

## 2024-03-05 PROCEDURE — 97016 VASOPNEUMATIC DEVICE THERAPY: CPT

## 2024-03-05 PROCEDURE — 97140 MANUAL THERAPY 1/> REGIONS: CPT

## 2024-03-05 NOTE — FLOWSHEET NOTE
[facility]- Outpatient Rehabilitation and Therapy  [address], [place] [address] office: [phone] fax: [phone]       Physical Therapy: TREATMENT/PROGRESS NOTE   Patient: Maureen Jade (58 y.o. female)   Examination Date: 2024   :  1965 MRN: [id number]   Visit #: 2   Insurance Allowable Auth Needed   BMN []Yes    [x]No    Insurance: Payor: BCBS / Plan: BCBS - [place] PPO / Product Type: *No Product type* /   Insurance ID: [id number] - ([facility])  Secondary Insurance (if applicable):    Treatment Diagnosis:     ICD-10-CM    1. Right knee pain, unspecified chronicity  M25.561       2. Knee stiffness, right  M25.661       3. Localized edema  R60.0          Medical Diagnosis:  Right knee pain [M25.561]   Referring Physician: Calvin Del Rio MD  PCP: Aixa Morley MD       Plan of care signed (Y/N):     Date of Patient follow up with Physician: 24     Progress Report/POC: NO  POC update due: (10 visits /OR AUTH LIMITS, whichever is less)  3/29/2024                                             Precautions/ Contra-indications:           Latex allergy:  NO  Pacemaker:    NO  Contraindications for Manipulation: recent surgical history (relative)  Date of Surgery: 24  Other:    Red Flags:  None    C-SSRS Triggered by Intake questionnaire:   [x] No, Questionnaire did not trigger screening.   [] Yes, Patient intake triggered further evaluation      [] C-SSRS Screening completed  [] PCP notified via Plan of Care  [] Emergency services notified     Preferred Language for Healthcare:   [x] English       [] other:    SUBJECTIVE EXAMINATION     Patient stated complaint: Pt reports knee pain around a 2/10, says she just takes Tylenol as needed. Using SPC. 3 weeks today from surgery      EVAL: Pt presents s/p R TKA on 24. She is using SPC on the L. Had home PT for the past 2 weeks. She is now driving and had f/u with MD a few days ago. No longer taking

## 2024-03-08 ENCOUNTER — HOSPITAL ENCOUNTER (OUTPATIENT)
Dept: PHYSICAL THERAPY | Age: 59
Setting detail: THERAPIES SERIES
Discharge: HOME | End: 2024-03-08
Payer: COMMERCIAL

## 2024-03-08 PROCEDURE — 97140 MANUAL THERAPY 1/> REGIONS: CPT

## 2024-03-08 PROCEDURE — 97110 THERAPEUTIC EXERCISES: CPT

## 2024-03-08 PROCEDURE — 97016 VASOPNEUMATIC DEVICE THERAPY: CPT

## 2024-03-08 NOTE — FLOWSHEET NOTE
[facility]- Outpatient Rehabilitation and Therapy  [address], [place] [address] office: [phone] fax: [phone]       Physical Therapy: TREATMENT/PROGRESS NOTE   Patient: Maureen Jade (58 y.o. female)   Examination Date: 2024   :  1965 MRN: [id number]   Visit #: 3   Insurance Allowable Auth Needed   BMN []Yes    [x]No    Insurance: Payor: BCBS / Plan: BCBS [place] PPO / Product Type: *No Product type* /   Insurance ID: [id number] - ([facility])  Secondary Insurance (if applicable):    Treatment Diagnosis:     ICD-10-CM    1. Right knee pain, unspecified chronicity  M25.561       2. Knee stiffness, right  M25.661       3. Localized edema  R60.0          Medical Diagnosis:  Right knee pain [M25.561]   Referring Physician: Calvin Del Rio MD  PCP: Aixa Morley MD       Plan of care signed (Y/N):     Date of Patient follow up with Physician: 24     Progress Report/POC: NO  POC update due: (10 visits /OR AUTH LIMITS, whichever is less)  3/29/2024                                             Precautions/ Contra-indications:           Latex allergy:  NO  Pacemaker:    NO  Contraindications for Manipulation: recent surgical history (relative)  Date of Surgery: 24  Other:    Red Flags:  None    C-SSRS Triggered by Intake questionnaire:   [x] No, Questionnaire did not trigger screening.   [] Yes, Patient intake triggered further evaluation      [] C-SSRS Screening completed  [] PCP notified via Plan of Care  [] Emergency services notified     Preferred Language for Healthcare:   [x] English       [] other:    SUBJECTIVE EXAMINATION     Patient stated complaint: Pt reports knee pain around a 1-2/10, sore after last session but no longer than a day. Using SPC. 3 weeks PO.    EVAL: Pt presents s/p R TKA on 24. She is using SPC on the L. Had home PT for the past 2 weeks. She is now driving and had f/u with MD a few days ago. No longer taking pain

## 2024-03-11 ENCOUNTER — HOSPITAL ENCOUNTER (OUTPATIENT)
Dept: PHYSICAL THERAPY | Age: 59
Setting detail: THERAPIES SERIES
Discharge: HOME | End: 2024-03-11
Payer: COMMERCIAL

## 2024-03-11 PROCEDURE — 97140 MANUAL THERAPY 1/> REGIONS: CPT

## 2024-03-11 PROCEDURE — 97016 VASOPNEUMATIC DEVICE THERAPY: CPT

## 2024-03-11 PROCEDURE — 97110 THERAPEUTIC EXERCISES: CPT

## 2024-03-11 NOTE — FLOWSHEET NOTE
1-2 muscle (06386)     Aquatic Therex (95567)    Dry Needle 3+ muscle (44997)     Iontophoresis (30960)    VASO (16907) 1    Ultrasound (72439)    Group Therapy (72922)     Estim Attended (38106)    Canalith Repositioning (69871)     Other:    Other:    Total Timed Code Tx Minutes 40 3  1     Total Treatment Minutes 50        Charge Justification:  (28449) THERAPEUTIC EXERCISE - Provided verbal/tactile cueing for activities related to strengthening, flexibility, endurance, ROM performed to prevent loss of range of motion, maintain or improve muscular strength or increase flexibility, following either an injury or surgery.   (80295) HOME EXERCISE PROGRAM - Reviewed/Progressed HEP activities related to strengthening, flexibility, endurance, ROM performed to prevent loss of range of motion, maintain or improve muscular strength or increase flexibility, following either an injury or surgery.  (37097) VASOPNEUMATIC      GOALS     Patient stated goal: return to walking, exercising; increased strength and ROM  Status:  [] Progressing: [] Met: [] Not Met: [] Adjusted    Therapist goals for Patient:   Short Term Goals: To be achieved in: 2 weeks  Independent in HEP and progression per patient tolerance, in order to progress toward full function and prevent re-injury.    Status: [x] Progressing: [] Met: [] Not Met: [] Adjusted  Patient will have a decrease in pain to 1/10 to help facilitate improvement in movement, function, and ADLs as indicated by functional deficits.   Status: [x] Progressing: [] Met: [] Not Met: [] Adjusted    Long Term Goals: To be achieved in: 8-10 weeks  Disability index score of 20% or less for the WOMAC to assist with return top prior level of function.   Status: [] Progressing: [] Met: [] Not Met: [] Adjusted  Improve knee AROM to flexion 125-135 degrees or  better to allow for proper joint functioning as indicated by patients functional deficits.  Status: [] Progressing: [] Met: [] Not Met: []

## 2024-03-14 ENCOUNTER — HOSPITAL ENCOUNTER (OUTPATIENT)
Dept: PHYSICAL THERAPY | Age: 59
Setting detail: THERAPIES SERIES
Discharge: HOME | End: 2024-03-14
Payer: COMMERCIAL

## 2024-03-14 PROCEDURE — 97016 VASOPNEUMATIC DEVICE THERAPY: CPT

## 2024-03-14 PROCEDURE — 97140 MANUAL THERAPY 1/> REGIONS: CPT

## 2024-03-14 PROCEDURE — 97110 THERAPEUTIC EXERCISES: CPT

## 2024-03-14 NOTE — FLOWSHEET NOTE
quadriceps, and hamstrings to allow for proper muscle and joint use in functional mobility, ADLs and prior level of function   Status: [] Progressing: [] Met: [] Not Met: [] Adjusted  Patient will return to  up/down 1 flight of stairs  without increased symptoms or restriction to work towards return to prior level of function.        Status: [] Progressing: [] Met: [] Not Met: [] Adjusted  Patient will resume normal leisure activities including walking 2 blocks without pain.         Status: [] Progressing: [] Met: [] Not Met: [] Adjusted    TREATMENT PLAN     Plan: Cont POC- Continue emphasis/focus on exercise progression, improving proper muscle recruitment and activation/motor control patterns, increasing ROM, and static and dynamic balance. Next visit plan to progress reps, add new exercises, and progress balance     Electronically Signed by Destiny Bui PT, DPT  Date: 03/14/2024     Note: Portions of this note have been templated and/or copied from initial evaluation, reassessments and prior notes for documentation efficiency.

## 2024-03-19 ENCOUNTER — HOSPITAL ENCOUNTER (OUTPATIENT)
Dept: PHYSICAL THERAPY | Age: 59
Setting detail: THERAPIES SERIES
Discharge: HOME | End: 2024-03-19
Payer: COMMERCIAL

## 2024-03-19 PROCEDURE — 97140 MANUAL THERAPY 1/> REGIONS: CPT

## 2024-03-19 PROCEDURE — 97016 VASOPNEUMATIC DEVICE THERAPY: CPT

## 2024-03-19 PROCEDURE — 97110 THERAPEUTIC EXERCISES: CPT

## 2024-03-19 NOTE — FLOWSHEET NOTE
allow for proper muscle and joint use in functional mobility, ADLs and prior level of function   Status: [] Progressing: [] Met: [] Not Met: [] Adjusted  Patient will return to  up/down 1 flight of stairs  without increased symptoms or restriction to work towards return to prior level of function.        Status: [] Progressing: [] Met: [] Not Met: [] Adjusted  Patient will resume normal leisure activities including walking 2 blocks without pain.         Status: [] Progressing: [] Met: [] Not Met: [] Adjusted    TREATMENT PLAN     Plan: Cont POC- Continue emphasis/focus on exercise progression, improving proper muscle recruitment and activation/motor control patterns, increasing ROM, and static and dynamic balance. Next visit plan to progress reps, add new exercises, and progress balance     Electronically Signed by Destiny Bui PT, DPT  Date: 03/19/2024     Note: Portions of this note have been templated and/or copied from initial evaluation, reassessments and prior notes for documentation efficiency.

## 2024-03-21 ENCOUNTER — HOSPITAL ENCOUNTER (OUTPATIENT)
Dept: PHYSICAL THERAPY | Age: 59
Setting detail: THERAPIES SERIES
Discharge: HOME | End: 2024-03-21
Payer: COMMERCIAL

## 2024-03-21 PROCEDURE — 97016 VASOPNEUMATIC DEVICE THERAPY: CPT | Performed by: PHYSICAL THERAPIST

## 2024-03-21 PROCEDURE — 97110 THERAPEUTIC EXERCISES: CPT | Performed by: PHYSICAL THERAPIST

## 2024-03-21 PROCEDURE — 97140 MANUAL THERAPY 1/> REGIONS: CPT | Performed by: PHYSICAL THERAPIST

## 2024-03-21 NOTE — FLOWSHEET NOTE
Progressing: [] Met: [] Not Met: [] Adjusted    Long Term Goals: To be achieved in: 8-10 weeks  Disability index score of 20% or less for the WOMAC to assist with return top prior level of function.   Status: [] Progressing: [] Met: [] Not Met: [] Adjusted  Improve knee AROM to flexion 125-135 degrees or  better to allow for proper joint functioning as indicated by patients functional deficits.  Status: [] Progressing: [] Met: [] Not Met: [] Adjusted  Pt to improve strength to 4+/5 or better of proximal hip, quadriceps, and hamstrings to allow for proper muscle and joint use in functional mobility, ADLs and prior level of function   Status: [] Progressing: [] Met: [] Not Met: [] Adjusted  Patient will return to  up/down 1 flight of stairs  without increased symptoms or restriction to work towards return to prior level of function.        Status: [] Progressing: [] Met: [] Not Met: [] Adjusted  Patient will resume normal leisure activities including walking 2 blocks without pain.         Status: [] Progressing: [] Met: [] Not Met: [] Adjusted    TREATMENT PLAN     Plan: Cont POC- Continue emphasis/focus on exercise progression, improving proper muscle recruitment and activation/motor control patterns, increasing ROM, and static and dynamic balance. Next visit plan to progress reps, add new exercises, and progress balance     Electronically Signed by Pattie Damico PT, DPT  Date: 03/21/2024     Note: Portions of this note have been templated and/or copied from initial evaluation, reassessments and prior notes for documentation efficiency.

## 2024-03-26 ENCOUNTER — HOSPITAL ENCOUNTER (OUTPATIENT)
Dept: PHYSICAL THERAPY | Age: 59
Setting detail: THERAPIES SERIES
Discharge: HOME | End: 2024-03-26
Payer: COMMERCIAL

## 2024-03-26 PROCEDURE — 97110 THERAPEUTIC EXERCISES: CPT

## 2024-03-26 PROCEDURE — 97016 VASOPNEUMATIC DEVICE THERAPY: CPT

## 2024-03-26 PROCEDURE — 97112 NEUROMUSCULAR REEDUCATION: CPT

## 2024-03-26 NOTE — FLOWSHEET NOTE
Unattended (87658)     Ther. Act (45359)    Mech. Traction (09031)     Gait (66543)    Dry Needle 1-2 muscle (20560)     Aquatic Therex (34826)    Dry Needle 3+ muscle (20561)     Iontophoresis (38550)    VASO (39219) 1    Ultrasound (56278)    Group Therapy (02735)     Estim Attended (36347)    Canalith Repositioning (82586)     Other:    Other:    Total Timed Code Tx Minutes 40' 3  1     Total Treatment Minutes 50'        Charge Justification:  (42437) THERAPEUTIC EXERCISE - Provided verbal/tactile cueing for activities related to strengthening, flexibility, endurance, ROM performed to prevent loss of range of motion, maintain or improve muscular strength or increase flexibility, following either an injury or surgery.   (32435) HOME EXERCISE PROGRAM - Reviewed/Progressed HEP activities related to strengthening, flexibility, endurance, ROM performed to prevent loss of range of motion, maintain or improve muscular strength or increase flexibility, following either an injury or surgery.  (26147) MANUAL THERAPY -  Manual therapy techniques, 1 or more regions, each 15 minutes (Mobilization/manipulation, manual lymphatic drainage, manual traction) for the purpose of modulating pain, promoting relaxation,  increasing ROM, reducing/eliminating soft tissue swelling/inflammation/restriction, improving soft tissue extensibility and allowing for proper ROM for normal function with self care, mobility, lifting and ambulation  (07267) VASOPNEUMATIC      GOALS     Patient stated goal: return to walking, exercising; increased strength and ROM  Status:  [] Progressing: [] Met: [] Not Met: [] Adjusted    Therapist goals for Patient:   Short Term Goals: To be achieved in: 2 weeks  Independent in HEP and progression per patient tolerance, in order to progress toward full function and prevent re-injury.    Status: [x] Progressing: [] Met: [] Not Met: [] Adjusted  Patient will have a decrease in pain to 1/10 to help facilitate

## 2024-03-29 ENCOUNTER — HOSPITAL ENCOUNTER (OUTPATIENT)
Dept: PHYSICAL THERAPY | Age: 59
Setting detail: THERAPIES SERIES
Discharge: HOME | End: 2024-03-29
Payer: COMMERCIAL

## 2024-03-29 PROCEDURE — 97110 THERAPEUTIC EXERCISES: CPT

## 2024-03-29 PROCEDURE — 97016 VASOPNEUMATIC DEVICE THERAPY: CPT

## 2024-03-29 PROCEDURE — 97112 NEUROMUSCULAR REEDUCATION: CPT

## 2024-03-29 PROCEDURE — 97140 MANUAL THERAPY 1/> REGIONS: CPT

## 2024-03-29 NOTE — PLAN OF CARE
extensibility and allowing for proper ROM for normal function with self care, mobility, lifting and ambulation  (24149) VASOPNEUMATIC      GOALS     Patient stated goal: return to walking, exercising; increased strength and ROM  Status:  [x] Progressing: [] Met: [] Not Met: [] Adjusted    Therapist goals for Patient:   Short Term Goals: To be achieved in: 2 weeks  Independent in HEP and progression per patient tolerance, in order to progress toward full function and prevent re-injury.    Status: [] Progressing: [x] Met: [] Not Met: [] Adjusted  Patient will have a decrease in pain to 1/10 to help facilitate improvement in movement, function, and ADLs as indicated by functional deficits.   Status: [] Progressing: [x] Met: [] Not Met: [] Adjusted    Long Term Goals: To be achieved in: 8-10 weeks  Disability index score of 20% or less for the WOMAC to assist with return top prior level of function.   Status: [x] Progressing: [] Met: [] Not Met: [] Adjusted  Improve knee AROM to flexion 125-135 degrees or  better to allow for proper joint functioning as indicated by patients functional deficits.  Status: [x] Progressing: [] Met: [] Not Met: [] Adjusted  Pt to improve strength to 4+/5 or better of proximal hip, quadriceps, and hamstrings to allow for proper muscle and joint use in functional mobility, ADLs and prior level of function   Status: [] Progressing: [x] Met: [] Not Met: [] Adjusted  Patient will return to  up/down 1 flight of stairs  without increased symptoms or restriction to work towards return to prior level of function.        Status: [x] Progressing: [] Met: [] Not Met: [] Adjusted  Patient will resume normal leisure activities including walking 2 blocks without pain.         Status: [x] Progressing: [] Met: [] Not Met: [] Adjusted    TREATMENT PLAN     Plan: Cont POC- Continue emphasis/focus on exercise progression, improving proper muscle recruitment and activation/motor control patterns, increasing

## 2024-04-01 ENCOUNTER — HOSPITAL ENCOUNTER (OUTPATIENT)
Dept: PHYSICAL THERAPY | Age: 59
Setting detail: THERAPIES SERIES
Discharge: HOME | End: 2024-04-01
Payer: COMMERCIAL

## 2024-04-01 PROCEDURE — 97110 THERAPEUTIC EXERCISES: CPT

## 2024-04-01 PROCEDURE — 97140 MANUAL THERAPY 1/> REGIONS: CPT

## 2024-04-01 PROCEDURE — 97016 VASOPNEUMATIC DEVICE THERAPY: CPT

## 2024-04-01 PROCEDURE — 97112 NEUROMUSCULAR REEDUCATION: CPT

## 2024-04-01 NOTE — FLOWSHEET NOTE
ambulation  (29163) VASOPNEUMATIC      GOALS     Patient stated goal: return to walking, exercising; increased strength and ROM  Status:  [x] Progressing: [] Met: [] Not Met: [] Adjusted    Therapist goals for Patient:   Short Term Goals: To be achieved in: 2 weeks  Independent in HEP and progression per patient tolerance, in order to progress toward full function and prevent re-injury.    Status: [] Progressing: [x] Met: [] Not Met: [] Adjusted  Patient will have a decrease in pain to 1/10 to help facilitate improvement in movement, function, and ADLs as indicated by functional deficits.   Status: [] Progressing: [x] Met: [] Not Met: [] Adjusted    Long Term Goals: To be achieved in: 8-10 weeks  Disability index score of 20% or less for the WOMAC to assist with return top prior level of function.   Status: [x] Progressing: [] Met: [] Not Met: [] Adjusted  Improve knee AROM to flexion 125-135 degrees or  better to allow for proper joint functioning as indicated by patients functional deficits.  Status: [x] Progressing: [] Met: [] Not Met: [] Adjusted  Pt to improve strength to 4+/5 or better of proximal hip, quadriceps, and hamstrings to allow for proper muscle and joint use in functional mobility, ADLs and prior level of function   Status: [] Progressing: [x] Met: [] Not Met: [] Adjusted  Patient will return to  up/down 1 flight of stairs  without increased symptoms or restriction to work towards return to prior level of function.        Status: [x] Progressing: [] Met: [] Not Met: [] Adjusted  Patient will resume normal leisure activities including walking 2 blocks without pain.         Status: [x] Progressing: [] Met: [] Not Met: [] Adjusted    TREATMENT PLAN     Plan: Cont POC- Continue emphasis/focus on exercise progression, improving proper muscle recruitment and activation/motor control patterns, increasing ROM, and static and dynamic balance. Next visit plan to progress reps, add new exercises, and progress

## 2024-04-04 ENCOUNTER — APPOINTMENT (OUTPATIENT)
Dept: PHYSICAL THERAPY | Age: 59
End: 2024-04-04
Payer: COMMERCIAL

## 2024-04-05 ENCOUNTER — HOSPITAL ENCOUNTER (OUTPATIENT)
Dept: PHYSICAL THERAPY | Age: 59
Setting detail: THERAPIES SERIES
Discharge: HOME | End: 2024-04-05
Payer: COMMERCIAL

## 2024-04-05 PROCEDURE — 97110 THERAPEUTIC EXERCISES: CPT

## 2024-04-05 PROCEDURE — 97140 MANUAL THERAPY 1/> REGIONS: CPT

## 2024-04-05 PROCEDURE — 97016 VASOPNEUMATIC DEVICE THERAPY: CPT

## 2024-04-05 PROCEDURE — 97112 NEUROMUSCULAR REEDUCATION: CPT

## 2024-04-05 NOTE — FLOWSHEET NOTE
walking, exercising; increased strength and ROM  Status:  [x] Progressing: [] Met: [] Not Met: [] Adjusted    Therapist goals for Patient:   Short Term Goals: To be achieved in: 2 weeks  Independent in HEP and progression per patient tolerance, in order to progress toward full function and prevent re-injury.    Status: [] Progressing: [x] Met: [] Not Met: [] Adjusted  Patient will have a decrease in pain to 1/10 to help facilitate improvement in movement, function, and ADLs as indicated by functional deficits.   Status: [] Progressing: [x] Met: [] Not Met: [] Adjusted    Long Term Goals: To be achieved in: 8-10 weeks  Disability index score of 20% or less for the WOMAC to assist with return top prior level of function.   Status: [x] Progressing: [] Met: [] Not Met: [] Adjusted  Improve knee AROM to flexion 125-135 degrees or  better to allow for proper joint functioning as indicated by patients functional deficits.  Status: [x] Progressing: [] Met: [] Not Met: [] Adjusted  Pt to improve strength to 4+/5 or better of proximal hip, quadriceps, and hamstrings to allow for proper muscle and joint use in functional mobility, ADLs and prior level of function   Status: [] Progressing: [x] Met: [] Not Met: [] Adjusted  Patient will return to  up/down 1 flight of stairs  without increased symptoms or restriction to work towards return to prior level of function.        Status: [x] Progressing: [] Met: [] Not Met: [] Adjusted  Patient will resume normal leisure activities including walking 2 blocks without pain.         Status: [x] Progressing: [] Met: [] Not Met: [] Adjusted    TREATMENT PLAN     Plan: Cont POC- Continue emphasis/focus on exercise progression, improving proper muscle recruitment and activation/motor control patterns, increasing ROM, and static and dynamic balance. Next visit plan to progress reps, add new exercises, and progress balance     Electronically Signed by Destiny Bui PT, DPT  Date: 04/05/2024

## 2024-04-09 ENCOUNTER — HOSPITAL ENCOUNTER (OUTPATIENT)
Dept: PHYSICAL THERAPY | Age: 59
Setting detail: THERAPIES SERIES
Discharge: HOME | End: 2024-04-09
Payer: COMMERCIAL

## 2024-04-09 PROCEDURE — 97110 THERAPEUTIC EXERCISES: CPT

## 2024-04-09 PROCEDURE — 97016 VASOPNEUMATIC DEVICE THERAPY: CPT

## 2024-04-09 PROCEDURE — 97112 NEUROMUSCULAR REEDUCATION: CPT

## 2024-04-09 PROCEDURE — 97140 MANUAL THERAPY 1/> REGIONS: CPT

## 2024-04-09 NOTE — FLOWSHEET NOTE
Progressing: [] Met: [] Not Met: [] Adjusted    Therapist goals for Patient:   Short Term Goals: To be achieved in: 2 weeks  Independent in HEP and progression per patient tolerance, in order to progress toward full function and prevent re-injury.    Status: [] Progressing: [x] Met: [] Not Met: [] Adjusted  Patient will have a decrease in pain to 1/10 to help facilitate improvement in movement, function, and ADLs as indicated by functional deficits.   Status: [] Progressing: [x] Met: [] Not Met: [] Adjusted    Long Term Goals: To be achieved in: 8-10 weeks  Disability index score of 20% or less for the WOMAC to assist with return top prior level of function.   Status: [x] Progressing: [] Met: [] Not Met: [] Adjusted  Improve knee AROM to flexion 125-135 degrees or  better to allow for proper joint functioning as indicated by patients functional deficits.  Status: [x] Progressing: [] Met: [] Not Met: [] Adjusted  Pt to improve strength to 4+/5 or better of proximal hip, quadriceps, and hamstrings to allow for proper muscle and joint use in functional mobility, ADLs and prior level of function   Status: [] Progressing: [x] Met: [] Not Met: [] Adjusted  Patient will return to  up/down 1 flight of stairs  without increased symptoms or restriction to work towards return to prior level of function.        Status: [x] Progressing: [] Met: [] Not Met: [] Adjusted  Patient will resume normal leisure activities including walking 2 blocks without pain.         Status: [x] Progressing: [] Met: [] Not Met: [] Adjusted    TREATMENT PLAN     Plan: Cont POC- Continue emphasis/focus on exercise progression, improving proper muscle recruitment and activation/motor control patterns, increasing ROM, and static and dynamic balance. Next visit plan to progress reps, add new exercises, and progress balance     Electronically Signed by Destiny Bui PT, DPT  Date: 04/09/2024     Note: Portions of this note have been templated and/or

## 2024-04-11 ENCOUNTER — HOSPITAL ENCOUNTER (OUTPATIENT)
Dept: PHYSICAL THERAPY | Age: 59
Setting detail: THERAPIES SERIES
Discharge: HOME | End: 2024-04-11
Payer: COMMERCIAL

## 2024-04-11 PROCEDURE — 97112 NEUROMUSCULAR REEDUCATION: CPT

## 2024-04-11 PROCEDURE — 97110 THERAPEUTIC EXERCISES: CPT

## 2024-04-11 PROCEDURE — 97140 MANUAL THERAPY 1/> REGIONS: CPT

## 2024-04-11 NOTE — FLOWSHEET NOTE
[facility]- Outpatient Rehabilitation and Therapy  [address], [place] [address] office: [phone] fax: [phone]      Physical Therapy: TREATMENT/PROGRESS NOTE   Patient: Maureen Jade (58 y.o. female)   Examination Date: 2024   :  1965 MRN: [id number]   Visit #: 13   Insurance Allowable Auth Needed   BMN []Yes    [x]No    Insurance: Payor: BCBS / Plan: BCBS - [place] PPO / Product Type: *No Product type* /   Insurance ID: [id number] - ([facility])  Secondary Insurance (if applicable):    Treatment Diagnosis:     ICD-10-CM    1. Right knee pain, unspecified chronicity  M25.561       2. Knee stiffness, right  M25.661       3. Localized edema  R60.0          Medical Diagnosis:  Right knee pain [M25.561]   Referring Physician: Calvin Del Rio MD  PCP: Aixa Morley MD       Plan of care signed (Y/N):     Date of Patient follow up with Physician: 24     Progress Report/POC: NO  POC update due: (10 visits /OR AUTH LIMITS, whichever is less)  2024                                             Precautions/ Contra-indications:           Latex allergy:  NO  Pacemaker:    NO  Contraindications for Manipulation: recent surgical history (relative)  Date of Surgery: 24  Other:    Red Flags:  None    C-SSRS Triggered by Intake questionnaire:   [x] No, Questionnaire did not trigger screening.   [] Yes, Patient intake triggered further evaluation      [] C-SSRS Screening completed  [] PCP notified via Plan of Care  [] Emergency services notified     Preferred Language for Healthcare:   [x] English       [] other:    SUBJECTIVE EXAMINATION     Patient stated complaint: Patient reports no knee pain, tightness with first movements in the morning or after sitting awhile. Feels her walking pattern has improved and she can tell if she starts to compensate.      [EVAL: Pt presents s/p R TKA on 24. She is using SPC on the L. Had home PT for the past 2 weeks.

## 2024-04-16 ENCOUNTER — HOSPITAL ENCOUNTER (OUTPATIENT)
Dept: PHYSICAL THERAPY | Age: 59
Setting detail: THERAPIES SERIES
Discharge: HOME | End: 2024-04-16
Payer: COMMERCIAL

## 2024-04-16 PROCEDURE — 97140 MANUAL THERAPY 1/> REGIONS: CPT

## 2024-04-16 PROCEDURE — 97110 THERAPEUTIC EXERCISES: CPT

## 2024-04-16 PROCEDURE — 97016 VASOPNEUMATIC DEVICE THERAPY: CPT

## 2024-04-16 PROCEDURE — 97112 NEUROMUSCULAR REEDUCATION: CPT

## 2024-04-16 NOTE — FLOWSHEET NOTE
[facility]- Outpatient Rehabilitation and Therapy  [address], [place] [address] office: [phone] fax: [phone]      Physical Therapy: TREATMENT/PROGRESS NOTE   Patient: Maureen Jade (58 y.o. female)   Examination Date: 2024   :  1965 MRN: [id number]   Visit #: 14   Insurance Allowable Auth Needed   BMN []Yes    [x]No    Insurance: Payor: BCBS / Plan: BCBS - [place] PPO / Product Type: *No Product type* /   Insurance ID: [id number] - ([facility])  Secondary Insurance (if applicable):    Treatment Diagnosis:     ICD-10-CM    1. Right knee pain, unspecified chronicity  M25.561       2. Knee stiffness, right  M25.661       3. Localized edema  R60.0          Medical Diagnosis:  Right knee pain [M25.561]   Referring Physician: Calvin Del Rio MD  PCP: Aixa Morley MD       Plan of care signed (Y/N):     Date of Patient follow up with Physician: 24     Progress Report/POC: NO  POC update due: (10 visits /OR AUTH LIMITS, whichever is less)  2024                                             Precautions/ Contra-indications:           Latex allergy:  NO  Pacemaker:    NO  Contraindications for Manipulation: recent surgical history (relative)  Date of Surgery: 24  Other:    Red Flags:  None    C-SSRS Triggered by Intake questionnaire:   [x] No, Questionnaire did not trigger screening.   [] Yes, Patient intake triggered further evaluation      [] C-SSRS Screening completed  [] PCP notified via Plan of Care  [] Emergency services notified     Preferred Language for Healthcare:   [x] English       [] other:    SUBJECTIVE EXAMINATION     Patient stated complaint: Patient reports no knee pain. HEP going well. Notices a \"knot\" or something above her R knee. (Pointing to quad tendon)    [EVAL: Pt presents s/p R TKA on 24. She is using SPC on the L. Had home PT for the past 2 weeks. She is now driving and had f/u with MD a few days ago. No longer

## 2024-04-18 ENCOUNTER — HOSPITAL ENCOUNTER (OUTPATIENT)
Dept: PHYSICAL THERAPY | Age: 59
Setting detail: THERAPIES SERIES
Discharge: HOME | End: 2024-04-18
Payer: COMMERCIAL

## 2024-04-18 PROCEDURE — 97112 NEUROMUSCULAR REEDUCATION: CPT

## 2024-04-18 PROCEDURE — 97110 THERAPEUTIC EXERCISES: CPT

## 2024-04-18 PROCEDURE — 97016 VASOPNEUMATIC DEVICE THERAPY: CPT

## 2024-04-18 NOTE — FLOWSHEET NOTE
[facility]- Outpatient Rehabilitation and Therapy  [address], [place] [address] office: [phone] fax: [phone]      Physical Therapy: TREATMENT/PROGRESS NOTE   Patient: Maureen Jade (58 y.o. female)   Examination Date: 2024   :  1965 MRN: [id number]   Visit #: 15   Insurance Allowable Auth Needed   BMN []Yes    [x]No    Insurance: Payor: BCBS / Plan: BCBS - [place] PPO / Product Type: *No Product type* /   Insurance ID: [id number] - ([place] [employer])  Secondary Insurance (if applicable):    Treatment Diagnosis:     ICD-10-CM    1. Right knee pain, unspecified chronicity  M25.561       2. Knee stiffness, right  M25.661       3. Localized edema  R60.0          Medical Diagnosis:  Right knee pain [M25.561]   Referring Physician: Calvin Del Rio MD  PCP: Aixa Morley MD       Plan of care signed (Y/N):     Date of Patient follow up with Physician: 24     Progress Report/POC: NO  POC update due: (10 visits /OR AUTH LIMITS, whichever is less)  2024                                             Precautions/ Contra-indications:           Latex allergy:  NO  Pacemaker:    NO  Contraindications for Manipulation: recent surgical history (relative)  Date of Surgery: 24  Other:    Red Flags:  None    C-SSRS Triggered by Intake questionnaire:   [x] No, Questionnaire did not trigger screening.   [] Yes, Patient intake triggered further evaluation      [] C-SSRS Screening completed  [] PCP notified via Plan of Care  [] Emergency services notified     Preferred Language for Healthcare:   [x] English       [] other:    SUBJECTIVE EXAMINATION     Patient stated complaint: Patient reports some muscle soreness following prior session but this morning has none. Continues HEP with no issues.    [EVAL: Pt presents s/p R TKA on 24. She is using [facility] on the L. Had home PT for the past 2 weeks. She is now driving and had f/u with MD a few days ago. No longer

## 2024-04-23 ENCOUNTER — HOSPITAL ENCOUNTER (OUTPATIENT)
Dept: PHYSICAL THERAPY | Age: 59
Setting detail: THERAPIES SERIES
Discharge: HOME | End: 2024-04-23
Payer: COMMERCIAL

## 2024-04-23 PROCEDURE — 97112 NEUROMUSCULAR REEDUCATION: CPT

## 2024-04-23 PROCEDURE — 97016 VASOPNEUMATIC DEVICE THERAPY: CPT

## 2024-04-23 PROCEDURE — 97110 THERAPEUTIC EXERCISES: CPT

## 2024-04-23 NOTE — PLAN OF CARE
Emphasis on TKE   MILAGRO hip abd 60#  2x15 ea    MILAGRO TKE 75# 15x 5\"H     LP 90# 2x15     SL LP  50# 2x15     Knee extension 25# 2x10 eccentric R     Hamstring curl 35# 2x15 eccentric R      3\" 2x10 RUE for balance   Lat and fwd step downs 6\"  10x challenging   GVL 2 laps     Squats LXX On airex 3\" 2x10 Cueing for weight shift to RLE   airex  2x30\" ea    Fwd step ups BOSU  Lat step ups BOSU  3'\"H 2x15x ea Unilateral HHS          Manual Intervention (57205)  TIME                                     Therapeutic Activity (59939)  Sets/time                                          Modalities:   Vasopneumatic Compression (Game Ready): Applied to Knee Right for significant edema, swelling, pain control for  10 minutes.  Relevant ICD-10 R60.9 Edema unspecified.   Girth Left Right Post Vaso   Knee: Midpatella 38.7cm 38.7cm 38.5cm   Knee: 5 cm above 39.0cm 41.4cm    Knee: 15 cm above      Ankle: transmalleolar      Ankle: figure 8         Education/Home Exercise Program: Patient HEP program created electronically.  Refer to [employer] access code:    Access Code: [email or web address]  URL: [email or web address]  Date: 03/01/2024  Prepared by: Destiny Bui    Exercises  - Supine Knee Extension Mobilization with Weight  - 3-4 x daily - 7 x weekly - 3 sets - 10 reps  - Long Sitting Calf Stretch with Strap  - 2 x daily - 7 x weekly - 3-5 sets - 30 seconds hold  - Seated Table Hamstring Stretch  - 2 x daily - 7 x weekly - 3-5 sets - 30 seconds hold  - Long Sitting Quad Set  - 2 x daily - 7 x weekly - 10 reps - 10 seconds hold  - Supine Knee Extension Strengthening  - 1-2 x daily - 7 x weekly - 3 sets - 10 reps  - Active Straight Leg Raise with Quad Set  - 1-2 x daily - 7 x weekly - 3 sets - 10 reps  - Seated Long Arc Quad  - 1-2 x daily - 7 x weekly - 1 sets - 10 reps  - Supine Heel Slide with Strap  - 2 x daily - 7 x weekly - 10 reps - 10 seconds hold  - Side Stepping with Counter Support  - 2 x daily - 7 x weekly - 2 sets - 10

## 2024-04-24 ENCOUNTER — OFFICE VISIT (OUTPATIENT)
Dept: ORTHOPEDIC SURGERY | Age: 59
End: 2024-04-24

## 2024-04-24 VITALS — HEIGHT: 67 IN | WEIGHT: 190 LBS | BODY MASS INDEX: 29.82 KG/M2

## 2024-04-24 DIAGNOSIS — Z96.651 S/P TOTAL KNEE ARTHROPLASTY, RIGHT: Primary | ICD-10-CM

## 2024-04-24 PROCEDURE — 99024 POSTOP FOLLOW-UP VISIT: CPT | Performed by: ORTHOPAEDIC SURGERY

## 2024-04-24 NOTE — PROGRESS NOTES
Chief Complaint  Follow-up (Right knee. S/p tkr )      History of Present Illness:  Maureen Jade is a pleasant 58 y.o. female who is here today for repeat evaluation of her right knee.  She is approximately 2-month status post right total knee arthroplasty performed on 2/13/2024.  Overall, patient reports she is doing very well.  Pain is well-controlled.  Continues to work with formal physical therapy for range of motion and strengthening.  He is walking without a limp and without pain.  She has noticed a small area of a knot at the superior aspect of her incision but this is nonpainful.  No fever.  Mild swelling.  No other complaints this time.      Medical History:  Patient's medications, allergies, past medical, surgical, social and family histories were reviewed and updated as appropriate.    Pain Assessment  Location of Pain: Knee  Location Modifiers: Right  Severity of Pain: 1  Quality of Pain: Aching  Duration of Pain: Persistent  Frequency of Pain: Intermittent  Aggravating Factors: Stairs  Limiting Behavior: Some  Relieving Factors: Rest  Result of Injury: No  Work-Related Injury: No  Are there other pain locations you wish to document?: No  ROS: Review of systems reviewed from Patient History Form completed today and available in the patient's chart under the Media tab.      Pertinent items are noted in HPI  Review of systems reviewed from Patient History Form completed today and available in the patient's chart under the Media tab.       Vital Signs:  Ht 1.702 m (5' 7\")   Wt 86.2 kg (190 lb)   BMI 29.76 kg/m²       Right knee examination:    Gait: No use of assistive devices. No antalgic gait.    Alignment: normal alignment.    Inspection/skin: Skin is intact without erythema or ecchymosis. No gross deformity.     Palpation: no crepitus. no joint line tenderness present.    Range of Motion: 0-120 degrees    Strength: Normal quadriceps development.     Effusion: No effusion or swelling present.

## 2024-04-30 ENCOUNTER — APPOINTMENT (OUTPATIENT)
Dept: PHYSICAL THERAPY | Age: 59
End: 2024-04-30
Payer: COMMERCIAL

## 2024-05-01 ENCOUNTER — HOSPITAL ENCOUNTER (OUTPATIENT)
Dept: PHYSICAL THERAPY | Age: 59
Setting detail: THERAPIES SERIES
Discharge: HOME | End: 2024-05-01
Payer: COMMERCIAL

## 2024-05-01 PROCEDURE — 97112 NEUROMUSCULAR REEDUCATION: CPT

## 2024-05-01 PROCEDURE — 97110 THERAPEUTIC EXERCISES: CPT

## 2024-05-01 NOTE — FLOWSHEET NOTE
[facility]- Outpatient Rehabilitation and Therapy  [address], [place] [address] office: [phone] fax: [phone]      Physical Therapy: TREATMENT/PROGRESS NOTE   Patient: Maureen Jade (58 y.o. female)   Examination Date: 2024   :  1965 MRN: [id number]   Visit #: 17   Insurance Allowable Auth Needed   BMN []Yes    [x]No    Insurance: Payor: BCBS / Plan: BCBS - [place] PPO / Product Type: *No Product type* /   Insurance ID: [id number] - ([place] BCBS)  Secondary Insurance (if applicable):    Treatment Diagnosis:     ICD-10-CM    1. Right knee pain, unspecified chronicity  M25.561       2. Knee stiffness, right  M25.661       3. Localized edema  R60.0          Medical Diagnosis:  Right knee pain [M25.561]   Referring Physician: Calvin Del Rio MD  PCP: Aixa Morley MD       Plan of care signed (Y/N):     Date of Patient follow up with Physician: 24     Progress Report/POC: NO  POC update due: (10 visits /OR AUTH LIMITS, whichever is less)                                              Precautions/ Contra-indications:           Latex allergy:  NO  Pacemaker:    NO  Contraindications for Manipulation: recent surgical history (relative)  Date of Surgery: 24  Other:    Red Flags:  None    C-SSRS Triggered by Intake questionnaire:   [x] No, Questionnaire did not trigger screening.   [] Yes, Patient intake triggered further evaluation      [] C-SSRS Screening completed  [] PCP notified via Plan of Care  [] Emergency services notified     Preferred Language for Healthcare:   [x] English       [] other:    SUBJECTIVE EXAMINATION     Patient stated complaint: Patient reports MD appt going well. She states having episode of very painful sciatic symptoms this weekend for two days down RLE to knee. She thinks she may have overdone her exercising doing water exercise, machines, and more walking all at once. The pain has since gone away and her knee continues to

## 2024-05-07 ENCOUNTER — HOSPITAL ENCOUNTER (OUTPATIENT)
Dept: PHYSICAL THERAPY | Age: 59
Setting detail: THERAPIES SERIES
Discharge: HOME | End: 2024-05-07
Payer: COMMERCIAL

## 2024-05-07 PROCEDURE — 97110 THERAPEUTIC EXERCISES: CPT

## 2024-05-07 PROCEDURE — 97112 NEUROMUSCULAR REEDUCATION: CPT

## 2024-05-07 NOTE — DISCHARGE SUMMARY
[facility]- Outpatient Rehabilitation and Therapy  [address], [place] [address] office: [phone] fax: [phone]     Physical Therapy Discharge Summary    Dear Calvin Del Rio MD  ,    We had the pleasure of treating the following patient for physical therapy services at [facility] Outpatient Physical Therapy.  A summary of our findings can be found in the discharge summary below.  If you have any questions or concerns regarding these findings, please do not hesitate to contact me at the office phone number checked above.  Thank you for the referral.     Physician Signature:________________________________Date:__________________  By signing above (or electronic signature), therapist’s plan is approved by physician      Functional Outcome: WOMAC: 11 % disability    Total Visits: 18     Plan of Care: Discharge from Physical Therapy    Reason for Discharge:  All Goal achieved and Patient should continue to improve in reasonable time if they continue HEP        Physical Therapy: TREATMENT/PROGRESS NOTE   Patient: Maureen Jade (58 y.o. female)   Examination Date: 2024   :  1965 MRN: [id number]   Visit #: 18   Insurance Allowable Auth Needed   BMN []Yes    [x]No    Insurance: Payor: BCBS / Plan: BCBS - [place] PPO / Product Type: *No Product type* /   Insurance ID: [id number] - ([facility])  Secondary Insurance (if applicable):    Treatment Diagnosis:     ICD-10-CM    1. Right knee pain, unspecified chronicity  M25.561       2. Knee stiffness, right  M25.661       3. Localized edema  R60.0          Medical Diagnosis:  Right knee pain [M25.561]   Referring Physician: Calvin Del Rio MD  PCP: Aixa Morley MD       Plan of care signed (Y/N):     Date of Patient follow up with Physician: 24     Progress Report/POC: YES, Date Range for this report: 24 to 24  POC update due: (10 visits /OR AUTH LIMITS, whichever is less)

## 2024-08-22 ENCOUNTER — OFFICE VISIT (OUTPATIENT)
Dept: ORTHOPEDIC SURGERY | Age: 59
End: 2024-08-22
Payer: COMMERCIAL

## 2024-08-22 VITALS — HEIGHT: 67 IN | BODY MASS INDEX: 29.82 KG/M2 | WEIGHT: 190 LBS

## 2024-08-22 DIAGNOSIS — M25.511 RIGHT SHOULDER PAIN, UNSPECIFIED CHRONICITY: Primary | ICD-10-CM

## 2024-08-22 DIAGNOSIS — Z96.611 HISTORY OF TOTAL REPLACEMENT OF RIGHT SHOULDER JOINT: ICD-10-CM

## 2024-08-22 DIAGNOSIS — M67.911 DYSFUNCTION OF RIGHT ROTATOR CUFF: ICD-10-CM

## 2024-08-22 PROCEDURE — 99214 OFFICE O/P EST MOD 30 MIN: CPT | Performed by: ORTHOPAEDIC SURGERY

## 2024-08-22 RX ORDER — MELOXICAM 15 MG/1
15 TABLET ORAL DAILY
Qty: 30 TABLET | Refills: 3 | Status: SHIPPED | OUTPATIENT
Start: 2024-08-22 | End: 2024-09-21

## 2024-08-22 NOTE — PROGRESS NOTES
[facility]  History and Physical  Shoulder Pain    Date:  2024    Name:  Maureen Jade  Address:  [address] [place] [address] [address] Elvin JAIMES  [facility] [address]    :  1965      Age:   58 y.o.    SSN:  [id number]      Medical Record Number:  [id number]    Reason for Visit:    Shoulder Pain (RIGHT SHOULDER)      HPI:   Maureen Jade is a 58 y.o. female who presents to our office today for follow up of the right shoulder pain.  She has a history of undergoing a right anatomic total shoulder arthroplasty back in .  She did have a mechanical fall in May 2023.  Her radiographs at that time looked stable and she was able to rehab her shoulder and went back to normal activities.  She had been doing okay until early summer 2024.  She reports she began having increasing pain with certain movements.  She reported having pain when she went to lift something off to the side or doing abduction movements.  She also reports pain is intermittent.      Pain Assessment  Location of Pain: Shoulder  Location Modifiers: Right  Severity of Pain: 3  Quality of Pain: Sharp  Duration of Pain: Persistent  Frequency of Pain: Intermittent  Aggravating Factors: Other (Comment), Stretching (STRETCHING ACROSS TORSO)  Limiting Behavior: Yes  Relieving Factors: Rest, Ice, Nsaids  Result of Injury: No  Work-Related Injury: No  Are there other pain locations you wish to document?: No        Review of Systems:  A 14 point review of systems available in the scanned medical record as documented by the patient and reviewed on 2024.  The review is negative with the exception of those things mentioned in the History of Present Illness and Past Medical History.      Past Medical History:  Patient's medications, allergies, past medical, surgical, social and family histories were reviewed and updated as appropriate.    Allergies:  No Known Allergies    Physical Exam:  There were no vitals filed for this

## 2024-08-26 ENCOUNTER — OFFICE VISIT (OUTPATIENT)
Dept: ORTHOPEDIC SURGERY | Age: 59
End: 2024-08-26
Payer: COMMERCIAL

## 2024-08-26 VITALS — BODY MASS INDEX: 29.83 KG/M2 | WEIGHT: 190.04 LBS | HEIGHT: 67 IN

## 2024-08-26 DIAGNOSIS — M17.12 PRIMARY OSTEOARTHRITIS OF LEFT KNEE: ICD-10-CM

## 2024-08-26 DIAGNOSIS — M25.561 RIGHT KNEE PAIN, UNSPECIFIED CHRONICITY: ICD-10-CM

## 2024-08-26 DIAGNOSIS — Z96.651 S/P TOTAL KNEE ARTHROPLASTY, RIGHT: Primary | ICD-10-CM

## 2024-08-26 PROCEDURE — 99213 OFFICE O/P EST LOW 20 MIN: CPT | Performed by: ORTHOPAEDIC SURGERY

## 2024-08-26 NOTE — PROGRESS NOTES
Date:  2024    Name:  Maureen Jade  Address:  [address] [place] [address] [address] Elvin JAIMES  [facility] [address]    :  1965      Age:   58 y.o.    SSN:        Medical Record Number:  [id number]    Reason for Visit:    Chief Complaint    Follow-up (Right Knee /S/p 6 Months TKR)      DOS:2024     HPI: Maureen Jade is a 58 y.o. female here today for a follow up evaluation of her right knee. She is 6 months status post total knee replacement. She reports that overall she is doing better. She has little to no pain. Her main complaint is fatigue. She state that although her knee feels tired she is able to function normally. She denies any new injury or swelling. Of note, she has left knee pain that is mild with associated with no injury.      Pain Assessment  Location of Pain: Knee  Location Modifiers: Left, Right  Severity of Pain: 3  Quality of Pain: Other (Comment)  Duration of Pain: A few hours  Frequency of Pain: Intermittent  Aggravating Factors: Other (Comment)  Limiting Behavior: Yes  Result of Injury: No  Work-Related Injury: No  Are there other pain locations you wish to document?: No  ROS: All systems reviewed on patient intake form.  Pertinent items are noted in HPI.        Past Medical History:   Diagnosis Date    Cancer (HCC)     MELANOMA INSITU ON BACK    Osteoarthritis     PKU (phenylketonuria) (HCC)     Right knee pain         Past Surgical History:   Procedure Laterality Date    ANKLE FRACTURE SURGERY  1987    ANKLE SURGERY Right     spur removal    EYE SURGERY  Cataract        JOINT REPLACEMENT Left     partial knee    KNEE ARTHROSCOPY  2003    KNEE SURGERY  2009    SHOULDER ARTHROPLASTY Right 2020    RIGHT SHOULDER ARTHROTOMY, REMOVAL OF LOOSE BODIES, OPEN BICEPS TENODESIS, ANATOMIC TOTAL SHOULDER ARTHROPLASTY performed by Jose F Mcnulty MD at [facility] OR    SHOULDER SURGERY      SKIN CANCER EXCISION      left upper back    THYROID SURGERY Right     right  THYROID

## 2024-08-29 ENCOUNTER — OFFICE VISIT (OUTPATIENT)
Dept: ORTHOPEDIC SURGERY | Age: 59
End: 2024-08-29

## 2024-08-29 VITALS — WEIGHT: 190 LBS | BODY MASS INDEX: 29.76 KG/M2

## 2024-08-29 DIAGNOSIS — S46.011A STRAIN OF RIGHT ROTATOR CUFF CAPSULE, INITIAL ENCOUNTER: ICD-10-CM

## 2024-08-29 DIAGNOSIS — Z96.611 HISTORY OF TOTAL REPLACEMENT OF RIGHT SHOULDER JOINT: ICD-10-CM

## 2024-08-29 DIAGNOSIS — M67.911 TENDINOPATHY OF RIGHT ROTATOR CUFF: ICD-10-CM

## 2024-08-29 DIAGNOSIS — M25.511 RIGHT SHOULDER PAIN, UNSPECIFIED CHRONICITY: Primary | ICD-10-CM

## 2024-08-29 NOTE — PROGRESS NOTES
Chief Complaint:   Chief Complaint   Patient presents with    Shoulder Pain     NP R SHOULDER. Referred by Dr. Mcnulty for complete US. Had shoulder replaced in 2020. No known injury recently other than on in May of 2023.          History of Present Illness:       Patient is a 58 y.o. female presents with the above complaint.  He is seen in consultation at the request of Dr. FRANCISCO Mcnulty for complete ultrasound evaluation of the right shoulder status post anatomic TSA.  The symptoms began 2 monthsago and started without an injury.     DATE OF PROCEDURE:  05/19/2020  PROCEDURES:  Right shoulder examination under anesthesia, arthrotomy  with removal of multiple loose bodies, open biceps  tenodesis, and  anatomic total shoulder replacement    There is not associated mechanical symptoms localizing to the shoulder. The patient admits to symptoms of instability about the shoulder.       Pain levels 2-3.    Workup has included:X-ray    There is no history or autoimmune disease, inflammatory arthropathy or crystal arthropathy.        Past Medical History:        Past Medical History:   Diagnosis Date    Cancer (HCC) 2009    MELANOMA INSITU ON BACK    Osteoarthritis     PKU (phenylketonuria) (HCC)     Right knee pain          Past Surgical History:   Procedure Laterality Date    ANKLE FRACTURE SURGERY  6/1987    ANKLE SURGERY Right 1992    spur removal    EYE SURGERY  Cataract    2022    JOINT REPLACEMENT Left     partial knee    KNEE ARTHROSCOPY  8/2003    KNEE SURGERY  12/2009    SHOULDER ARTHROPLASTY Right 05/19/2020    RIGHT SHOULDER ARTHROTOMY, REMOVAL OF LOOSE BODIES, OPEN BICEPS TENODESIS, ANATOMIC TOTAL SHOULDER ARTHROPLASTY performed by Jose F Mcnulty MD at [facility] OR    SHOULDER SURGERY      SKIN CANCER EXCISION  2009    left upper back    THYROID SURGERY Right 2001    right  THYROID FOR CYST REMOVED    TOTAL KNEE ARTHROPLASTY Right 2/13/2024    RIGHT TOTAL KNEE REPLACEMENT performed by Calvin Del Rio MD at [facility] OR     8/22/2024  Radiology exam is complete. No Radiologist dictation. Please follow up with ordering provider.              Assessment   Impression: .    Encounter Diagnoses   Name Primary?    Tendinopathy of right rotator cuff     Strain of right rotator cuff capsule, initial encounter     History of total replacement of right shoulder joint     Right shoulder pain, unspecified chronicity Yes        Supraspinatus tendinopathy and strain      Plan:       Clinical follow-up with Dr. FRANCISCO Mcnulty as scheduled      The nature of the finding, probable diagnosis and likely treatment was thoroughly discussed with the patient. The options, risks, complications, alternative treatment as well as some of the differential diagnosis was discussed. The patient was thoroughly informed and all questions were answered. the patient indicated understanding and satisfaction with the discussion.      Orders:      No orders of the defined types were placed in this encounter.          Disclaimer:    \"This note was dictated with voice recognition software. Though review and correction are routine, we apologize for any errors.\"

## 2024-09-18 ENCOUNTER — OFFICE VISIT (OUTPATIENT)
Dept: ORTHOPEDIC SURGERY | Age: 59
End: 2024-09-18
Payer: COMMERCIAL

## 2024-09-18 VITALS — HEIGHT: 67 IN | BODY MASS INDEX: 29.82 KG/M2 | WEIGHT: 190 LBS

## 2024-09-18 DIAGNOSIS — Z96.611 HISTORY OF TOTAL REPLACEMENT OF RIGHT SHOULDER JOINT: Primary | ICD-10-CM

## 2024-09-18 DIAGNOSIS — M75.81 TENDINITIS OF RIGHT ROTATOR CUFF: ICD-10-CM

## 2024-09-18 PROCEDURE — 99213 OFFICE O/P EST LOW 20 MIN: CPT | Performed by: ORTHOPAEDIC SURGERY

## 2024-09-18 RX ORDER — METHYLPREDNISOLONE 4 MG
TABLET, DOSE PACK ORAL
Qty: 1 KIT | Refills: 0 | Status: SHIPPED | OUTPATIENT
Start: 2024-09-18 | End: 2024-09-24

## 2024-09-25 ENCOUNTER — HOSPITAL ENCOUNTER (OUTPATIENT)
Dept: PHYSICAL THERAPY | Age: 59
Setting detail: THERAPIES SERIES
Discharge: HOME | End: 2024-09-25
Payer: COMMERCIAL

## 2024-09-25 DIAGNOSIS — M25.611 SHOULDER STIFFNESS, RIGHT: ICD-10-CM

## 2024-09-25 DIAGNOSIS — R29.898 WEAKNESS OF RIGHT SHOULDER: ICD-10-CM

## 2024-09-25 DIAGNOSIS — M25.511 RIGHT SHOULDER PAIN, UNSPECIFIED CHRONICITY: Primary | ICD-10-CM

## 2024-09-25 PROCEDURE — 97161 PT EVAL LOW COMPLEX 20 MIN: CPT

## 2024-09-25 PROCEDURE — 97140 MANUAL THERAPY 1/> REGIONS: CPT

## 2024-09-25 PROCEDURE — 97110 THERAPEUTIC EXERCISES: CPT

## 2024-09-30 ENCOUNTER — HOSPITAL ENCOUNTER (OUTPATIENT)
Dept: PHYSICAL THERAPY | Age: 59
Setting detail: THERAPIES SERIES
Discharge: HOME | End: 2024-09-30
Payer: COMMERCIAL

## 2024-09-30 PROCEDURE — 97112 NEUROMUSCULAR REEDUCATION: CPT | Performed by: PHYSICAL THERAPIST

## 2024-09-30 PROCEDURE — 97110 THERAPEUTIC EXERCISES: CPT | Performed by: PHYSICAL THERAPIST

## 2024-09-30 PROCEDURE — 97140 MANUAL THERAPY 1/> REGIONS: CPT | Performed by: PHYSICAL THERAPIST

## 2024-09-30 NOTE — FLOWSHEET NOTE
[facility]- Outpatient Rehabilitation and Therapy  [address], [place] [address] office: [phone] fax: [phone]           Physical Therapy: TREATMENT/PROGRESS NOTE   Patient: Maureen Jade (58 y.o. female)   Examination Date: 2024   :  1965 MRN: [id number]   Visit #: 2   Insurance Allowable Auth Needed   90 (19 used) []Yes    [x]No    Insurance: Payor: BCBS / Plan: BCBS - [place] PPO / Product Type: *No Product type* /   Insurance ID: [id number] - ([place] [employer]BS)  Secondary Insurance (if applicable):    Treatment Diagnosis:     ICD-10-CM    1. Right shoulder pain, unspecified chronicity  M25.511       2. Shoulder stiffness, right  M25.611       3. Weakness of right shoulder  R29.898          Medical Diagnosis:  Right shoulder pain [M25.511]   Referring Physician: Jose F Mcnulty MD  PCP: Aixa Morley MD     Plan of care signed (Y/N):     Date of Patient follow up with Physician: 10/30/24     Plan of Care Report: NO  POC update due: (10 visits /OR AUTH LIMITS, whichever is less) 10/24/2024                                             Medical History:  Comorbidities:  Cancer/Tumor  Hypertension  Osteoarthritis  Relevant Medical History: R TSA in May 2020. R TKA 2024                                         Precautions/ Contra-indications:           Latex allergy:  NO  Pacemaker:    NO  Contraindications for Manipulation: NA  Date of Surgery: NA  Other:    Red Flags:  None    Suicide Screening:   The patient did not verbalize a primary behavioral concern, suicidal ideation, suicidal intent, or demonstrate suicidal behaviors.    Preferred Language for Healthcare:   [x] English       [] other:    SUBJECTIVE EXAMINATION     Patient stated complaint: Reports R shoulder stiffness in general noted upon arrival. Reports the exercises are helping and doing the stretches more often are helping with the stiffness.        Eval:Pt presents with ongoing R shoulder

## 2024-10-02 ENCOUNTER — HOSPITAL ENCOUNTER (OUTPATIENT)
Dept: PHYSICAL THERAPY | Age: 59
Setting detail: THERAPIES SERIES
Discharge: HOME | End: 2024-10-02
Payer: COMMERCIAL

## 2024-10-02 PROCEDURE — 97140 MANUAL THERAPY 1/> REGIONS: CPT

## 2024-10-02 PROCEDURE — 97110 THERAPEUTIC EXERCISES: CPT

## 2024-10-02 PROCEDURE — 97112 NEUROMUSCULAR REEDUCATION: CPT

## 2024-10-02 NOTE — FLOWSHEET NOTE
Plan just implemented, too soon (<30days) to assess goals progression   [] Goals require adjustment due to lack of progress  [] Patient is not progressing as expected and requires additional follow up with physician  [] Other:     TREATMENT PLAN     Frequency/Duration: 2x/week for 6 weeks for the following treatment interventions:    Interventions:  Therapeutic Exercise (53848) including: strength training, ROM, and functional mobility  Therapeutic Activities (62070) including: functional mobility training and education.  Neuromuscular Re-education (51499) activation and proprioception, including postural re-education.    Manual Therapy (35241) as indicated to include: Passive Range of Motion, Gr I-IV mobilizations, Soft Tissue Mobilization, and Dry Needling/IASTM  Modalities as needed that may include: Cryotherapy, Electrical Stimulation, and Biofeedback  Patient education on joint protection, postural re-education, activity modification, and progression of HEP    Plan: Cont POC- Continue emphasis/focus on exercise progression, improving proper muscle recruitment and activation/motor control patterns, modulating pain, and increasing ROM. Next visit plan to progress weights, progress reps, and add new exercises     Electronically Signed by Destiny Bui PT, DPT  Date: 10/02/2024     Note: Portions of this note have been templated and/or copied from initial evaluation, reassessments and prior notes for documentation efficiency.    Note: If patient does not return for scheduled/recommended follow up visits, this note will serve as a discharge from care along with the most recent update on progress.    Ortho Evaluation

## 2024-10-08 ENCOUNTER — HOSPITAL ENCOUNTER (OUTPATIENT)
Dept: PHYSICAL THERAPY | Age: 59
Setting detail: THERAPIES SERIES
Discharge: HOME | End: 2024-10-08
Payer: COMMERCIAL

## 2024-10-08 PROCEDURE — 97110 THERAPEUTIC EXERCISES: CPT

## 2024-10-08 PROCEDURE — 97140 MANUAL THERAPY 1/> REGIONS: CPT

## 2024-10-08 NOTE — FLOWSHEET NOTE
weekly - 2 sets - 15 reps - 5 hold  - Shoulder extension with resistance - Neutral  - 2 x daily - 7 x weekly - 2 sets - 15 reps - 5 hold  - Shoulder External Rotation with Anchored Resistance  - 1 x daily - 7 x weekly - 2 sets - 10 reps - 5 hold  - Shoulder Internal Rotation with Resistance  - 1 x daily - 7 x weekly - 2 sets - 10 reps - 5 hold    ASSESSMENT   Today's Assessment: During therapy this date, patient required verbal cueing, tactile cueing, modification of technique, progression of exercises and program, and manual interventions for exercise progression, improving proper muscle recruitment and activation/motor control patterns, modulating pain, increasing ROM, and improving soft tissue extensibility.Patient will continue to benefit from ongoing evaluation and advanced clinical decision from a Physical Therapist to address and improve pain control, ROM, muscle strength, and neuromuscular control to safely return to PLOF and ADLs/IADLs without symptoms or restrictions. Initial stiffness into IR/flexion ROM but improved with PROM and GH mob. She tolerates lattisimus dorsi strengthening and row progressions with no pain. Requires continued tactile and verbal cues for scapular retraction/depression prior to AROM in SL and with stability training on wall. Challenged by and fatigues with abduction isometrics and wall ball stability.     Medical Necessity Documentation:  I certify that this patient meets the below criteria necessary for medical necessity for care and/or justification of therapy services:  The patient has functional impairments and/or activity limitations and would benefit from continued outpatient therapy services to address the deficits outlined in the patients goals  The patient has a musculoskeletal condition(s) with a corresponding ICD-10 code that is of complexity and severity that require skilled therapeutic intervention. This has a direct and significant impact on the need for therapy and

## 2024-10-10 ENCOUNTER — HOSPITAL ENCOUNTER (OUTPATIENT)
Dept: PHYSICAL THERAPY | Age: 59
Setting detail: THERAPIES SERIES
Discharge: HOME | End: 2024-10-10
Payer: COMMERCIAL

## 2024-10-10 PROCEDURE — 97140 MANUAL THERAPY 1/> REGIONS: CPT

## 2024-10-10 PROCEDURE — 97110 THERAPEUTIC EXERCISES: CPT

## 2024-10-15 ENCOUNTER — HOSPITAL ENCOUNTER (OUTPATIENT)
Dept: PHYSICAL THERAPY | Age: 59
Setting detail: THERAPIES SERIES
Discharge: HOME | End: 2024-10-15
Payer: COMMERCIAL

## 2024-10-15 PROCEDURE — 97140 MANUAL THERAPY 1/> REGIONS: CPT

## 2024-10-15 PROCEDURE — 97110 THERAPEUTIC EXERCISES: CPT

## 2024-10-15 NOTE — FLOWSHEET NOTE
[facility]- Outpatient Rehabilitation and Therapy  [address], [place] [address] office: [phone] fax: [phone]      Physical Therapy: TREATMENT/PROGRESS NOTE   Patient: Maureen Jade (58 y.o. female)   Examination Date: 10/15/2024   :  1965 MRN: [id number]   Visit #: 6   Insurance Allowable Auth Needed   90 (19 used) []Yes    [x]No    Insurance: Payor: BCBS / Plan: BCBS - [place] PPO / Product Type: *No Product type* /   Insurance ID: [id number] - ([place] BCBS)  Secondary Insurance (if applicable):    Treatment Diagnosis:     ICD-10-CM    1. Right shoulder pain, unspecified chronicity  M25.511       2. Shoulder stiffness, right  M25.611       3. Weakness of right shoulder  R29.898          Medical Diagnosis:  Right shoulder pain [M25.511]   Referring Physician: Jose F Mcnulty MD  PCP: Aixa Morley MD     Plan of care signed (Y/N):     Date of Patient follow up with Physician: 10/30/24     Plan of Care Report: NO  POC update due: (10 visits /OR AUTH LIMITS, whichever is less) 10/24/2024                                             Medical History:  Comorbidities:  Cancer/Tumor  Hypertension  Osteoarthritis  Relevant Medical History: R TSA in May 2020. R TKA 2024                                         Precautions/ Contra-indications:           Latex allergy:  NO  Pacemaker:    NO  Contraindications for Manipulation: NA  Date of Surgery: NA  Other:    Red Flags:  None    Suicide Screening:   The patient did not verbalize a primary behavioral concern, suicidal ideation, suicidal intent, or demonstrate suicidal behaviors.    Preferred Language for Healthcare:   [x] English       [] other:    SUBJECTIVE EXAMINATION     Patient stated complaint: Patient said the shoulder slowly is feeling better. She feels the awareness of her shoulder blade position has helped a lot with reaching/ADL activities.      Eval:Pt presents with ongoing R shoulder pain since

## 2024-10-17 ENCOUNTER — HOSPITAL ENCOUNTER (OUTPATIENT)
Dept: PHYSICAL THERAPY | Age: 59
Setting detail: THERAPIES SERIES
Discharge: HOME | End: 2024-10-17
Payer: COMMERCIAL

## 2024-10-17 PROCEDURE — 97140 MANUAL THERAPY 1/> REGIONS: CPT

## 2024-10-17 PROCEDURE — 97110 THERAPEUTIC EXERCISES: CPT

## 2024-10-17 NOTE — FLOWSHEET NOTE
[facility]- Outpatient Rehabilitation and Therapy  [address], [place] [address] office: [phone] fax: [phone]      Physical Therapy: TREATMENT/PROGRESS NOTE   Patient: Maureen Jade (58 y.o. female)   Examination Date: 10/17/2024   :  1965 MRN: [id number]   Visit #: 7   Insurance Allowable Auth Needed   90 (19 used) []Yes    [x]No    Insurance: Payor: BCBS / Plan: BCBS - [place] PPO / Product Type: *No Product type* /   Insurance ID: [id number] - ([place] BCBS)  Secondary Insurance (if applicable):    Treatment Diagnosis:     ICD-10-CM    1. Right shoulder pain, unspecified chronicity  M25.511       2. Shoulder stiffness, right  M25.611       3. Weakness of right shoulder  R29.898          Medical Diagnosis:  Right shoulder pain [M25.511]   Referring Physician: Jose F Mcnulty MD  PCP: Aixa Morley MD     Plan of care signed (Y/N):     Date of Patient follow up with Physician: 10/30/24     Plan of Care Report: NO  POC update due: (10 visits /OR AUTH LIMITS, whichever is less) 10/24/2024                                             Medical History:  Comorbidities:  Cancer/Tumor  Hypertension  Osteoarthritis  Relevant Medical History: R TSA in May 2020. R TKA 2024                                         Precautions/ Contra-indications:           Latex allergy:  NO  Pacemaker:    NO  Contraindications for Manipulation: NA  Date of Surgery: NA  Other:    Red Flags:  None    Suicide Screening:   The patient did not verbalize a primary behavioral concern, suicidal ideation, suicidal intent, or demonstrate suicidal behaviors.    Preferred Language for Healthcare:   [x] English       [] other:    SUBJECTIVE EXAMINATION     Patient stated complaint: Patient said the shoulder continues to feel better and less pain with swinging pickleball paddle. Sore after progressions last session.     Eval:Pt presents with ongoing R shoulder pain since around July and has a

## 2024-10-22 ENCOUNTER — HOSPITAL ENCOUNTER (OUTPATIENT)
Dept: PHYSICAL THERAPY | Age: 59
Setting detail: THERAPIES SERIES
Discharge: HOME | End: 2024-10-22
Payer: COMMERCIAL

## 2024-10-22 PROCEDURE — 97110 THERAPEUTIC EXERCISES: CPT

## 2024-10-22 PROCEDURE — 97140 MANUAL THERAPY 1/> REGIONS: CPT

## 2024-10-22 NOTE — FLOWSHEET NOTE
[facility]- Outpatient Rehabilitation and Therapy  [address], [place] [address] office: [phone] fax: [phone]      Physical Therapy: TREATMENT/PROGRESS NOTE   Patient: Maureen aJde (58 y.o. female)   Examination Date: 10/22/2024   :  1965 MRN: [id number]   Visit #: 8   Insurance Allowable Auth Needed   90 (19 used) []Yes    [x]No    Insurance: Payor: BCBS / Plan: BCBS - [place] PPO / Product Type: *No Product type* /   Insurance ID: [id number] - ([place] BCBS)  Secondary Insurance (if applicable):    Treatment Diagnosis:     ICD-10-CM    1. Right shoulder pain, unspecified chronicity  M25.511       2. Shoulder stiffness, right  M25.611       3. Weakness of right shoulder  R29.898          Medical Diagnosis:  Right shoulder pain [M25.511]   Referring Physician: Jose F Mcnulty MD  PCP: Aixa Morley MD     Plan of care signed (Y/N):     Date of Patient follow up with Physician: 10/30/24     Plan of Care Report: NO  POC update due: (10 visits /OR AUTH LIMITS, whichever is less) 10/24/2024                                             Medical History:  Comorbidities:  Cancer/Tumor  Hypertension  Osteoarthritis  Relevant Medical History: R TSA in May 2020. R TKA 2024                                         Precautions/ Contra-indications:           Latex allergy:  NO  Pacemaker:    NO  Contraindications for Manipulation: NA  Date of Surgery: NA  Other:    Red Flags:  None    Suicide Screening:   The patient did not verbalize a primary behavioral concern, suicidal ideation, suicidal intent, or demonstrate suicidal behaviors.    Preferred Language for Healthcare:   [x] English       [] other:    SUBJECTIVE EXAMINATION     Patient stated complaint: Patient reports continued improvement in shoulder symptoms and said it tolerated 10 mile bike ride yesterday before feeling like she needed to change positions/swing the arm backwards almost for relief. Continues to

## 2024-10-29 ENCOUNTER — HOSPITAL ENCOUNTER (OUTPATIENT)
Dept: PHYSICAL THERAPY | Age: 59
Setting detail: THERAPIES SERIES
Discharge: HOME | End: 2024-10-29
Payer: COMMERCIAL

## 2024-10-29 PROCEDURE — 97140 MANUAL THERAPY 1/> REGIONS: CPT

## 2024-10-29 PROCEDURE — 97110 THERAPEUTIC EXERCISES: CPT

## 2024-10-29 NOTE — PLAN OF CARE
[facility]- Outpatient Rehabilitation and Therapy  [address], [place] [address] office: [phone] fax: [phone]    Physical Therapy Re-Certification Plan of Care    Dear Jose F Mcnulty MD  ,    We had the pleasure of treating the following patient for physical therapy services at [facility]. A summary of our findings can be found in the updated assessment below.  This includes our plan of care.  If you have any questions or concerns regarding these findings, please do not hesitate to contact me at the office phone number checked above.  Thank you for the referral.     Physician Signature:________________________________Date:__________________  By signing above (or electronic signature), therapist's plan is approved by physician      Functional Outcome: Quickdash:   Maureen Jade 1965 continues to present with functional deficits in strength symmetry, endurance of strength, and eccentric control  limiting ability with reaching overhead, carrying items, lifting items, and heavy home activity .  During therapy this date, patient required visual cueing, verbal cueing, progression of exercises and program, and manual interventions for exercise progression, improving proper muscle recruitment and activation/motor control patterns, kinesthetic sense and proprioception, and improving postural awareness. Patient will continue to benefit from ongoing evaluation and advanced clinical decision from a Physical Therapist to improve muscle strength, endurance, functional mobility, and proper body mechanics to safely return to PLOF, ADLs/IADLs, and recreational activity without symptoms or restrictions.    Overall Response to Treatment:  Patient is responding well to treatment and improvement is noted with regards to goals and met ROM goals, is progressing well in abd, ER, and flexion strength. She continues to have improved symptoms at rest and with

## 2024-10-30 ENCOUNTER — OFFICE VISIT (OUTPATIENT)
Dept: ORTHOPEDIC SURGERY | Age: 59
End: 2024-10-30

## 2024-10-30 VITALS — HEIGHT: 67 IN | BODY MASS INDEX: 29.82 KG/M2 | WEIGHT: 190 LBS

## 2024-10-30 DIAGNOSIS — Z96.611 HISTORY OF TOTAL REPLACEMENT OF RIGHT SHOULDER JOINT: Primary | ICD-10-CM

## 2024-10-30 NOTE — PROGRESS NOTES
[facility]  History and Physical  Shoulder Pain    Date:  10/30/2024    Name:  Maureen Jade  Address:  [address] [place] [address] [address]  [facility] [address]    :  1965      Age:   58 y.o.    SSN:  [id number]      Medical Record Number:  [id number]    Reason for Visit:    Shoulder Pain (F/U RIGHT SHOULDER)      HPI:   Maureen Jade is a 58 y.o. female who presents to our office today for follow up of the right shoulder pain.  Patient has a history of a right anatomic shoulder arthroplasty performed back in .  Patient was having pain to this right shoulder and did have extensive workup including a CT scan as well as an ultrasound.  CT scan did not demonstrate any loosening of the components.  Ultrasound showed that the rotator cuff was intact however did have tendinopathy.  She has since taken Medrol Dosepak as well as physical therapy and states that her right shoulder pain has improved.  She denies any interval injuries.      Pain Assessment  Location of Pain: Shoulder  Location Modifiers: Right  Severity of Pain: 1  Quality of Pain: Aching  Frequency of Pain: Intermittent  Aggravating Factors: Other (Comment), Exercise, Straightening, Stretching  Limiting Behavior: Some  Relieving Factors: Rest, Exercise, Other (Comment)  Work-Related Injury: No  Are there other pain locations you wish to document?: No    No notes on file    Review of Systems:  A 14 point review of systems available in the scanned medical record as documented by the patient and reviewed on 10/30/2024.  The review is negative with the exception of those things mentioned in the History of Present Illness and Past Medical History.      Past Medical History:  Patient's medications, allergies, past medical, surgical, social and family histories were reviewed and updated as appropriate.    Allergies:  No Known Allergies    Physical Exam:  There were no vitals filed for this visit.  General: Maureen CID

## 2024-11-06 ENCOUNTER — HOSPITAL ENCOUNTER (OUTPATIENT)
Dept: PHYSICAL THERAPY | Age: 59
Setting detail: THERAPIES SERIES
Discharge: HOME | End: 2024-11-06
Payer: COMMERCIAL

## 2024-11-06 PROCEDURE — 97140 MANUAL THERAPY 1/> REGIONS: CPT

## 2024-11-06 PROCEDURE — 97110 THERAPEUTIC EXERCISES: CPT

## 2024-11-06 NOTE — FLOWSHEET NOTE
[facility]- Outpatient Rehabilitation and Therapy  [address], [place] [address] office: [phone] fax: [phone]      Physical Therapy: TREATMENT/PROGRESS NOTE   Patient: Maureen Jade (58 y.o. female)   Examination Date: 2024   :  1965 MRN: [id number]   Visit #: 10   Insurance Allowable Auth Needed   90 (19 used) []Yes    [x]No    Insurance: Payor: BCBS / Plan: BCBS [place] PPO / Product Type: *No Product type* /   Insurance ID: [id number] - ([place] BCBS)  Secondary Insurance (if applicable):    Treatment Diagnosis:     ICD-10-CM    1. Right shoulder pain, unspecified chronicity  M25.511       2. Shoulder stiffness, right  M25.611       3. Weakness of right shoulder  R29.898          Medical Diagnosis:  Right shoulder pain [M25.511]   Referring Physician: Jose F Mcnulty MD  PCP: Aixa Morley MD     Plan of care signed (Y/N):     Date of Patient follow up with Physician: 24     Plan of Care Report: NO  POC update due: (10 visits /OR AUTH LIMITS, whichever is less) 2024                                             Medical History:  Comorbidities:  Cancer/Tumor  Hypertension  Osteoarthritis  Relevant Medical History: R TSA in May 2020. R TKA 2024                                         Precautions/ Contra-indications:           Latex allergy:  NO  Pacemaker:    NO  Contraindications for Manipulation: NA  Date of Surgery: NA  Other:    Red Flags:  None    Suicide Screening:   The patient did not verbalize a primary behavioral concern, suicidal ideation, suicidal intent, or demonstrate suicidal behaviors.    Preferred Language for Healthcare:   [x] English       [] other:    SUBJECTIVE EXAMINATION     Patient stated complaint: Patient reports continued improvement in shoulder symptoms and went out and hit some pickleball, no issues. She continues HEP and said she focused on slowing the lowering down like we talked about last time. She saw

## 2024-11-20 ENCOUNTER — HOSPITAL ENCOUNTER (OUTPATIENT)
Dept: PHYSICAL THERAPY | Age: 59
Setting detail: THERAPIES SERIES
Discharge: HOME | End: 2024-11-20
Payer: COMMERCIAL

## 2024-11-20 PROCEDURE — 97110 THERAPEUTIC EXERCISES: CPT

## 2024-11-20 PROCEDURE — 97140 MANUAL THERAPY 1/> REGIONS: CPT

## 2024-11-20 NOTE — PLAN OF CARE
groceries.   [] Progressing: [x] Met: [] Not Met: [] Adjusted  4. Patient will return to loading dishes up into overhead cabinet without increased symptoms or restriction.   [] Progressing: [x] Met: [] Not Met: [] Adjusted  5. Patient will return to playing pickleball without increased pain in shoulder. (patient specific functional goal)    [] Progressing: [x] Met: [] Not Met: [] Adjusted     Overall Progression Towards Functional goals/ Treatment Progress Update:  [x] Patient is progressing as expected towards functional goals listed.    [] Progression is slowed due to complexities/Impairments listed.  [] Progression has been slowed due to co-morbidities.  [] Plan just implemented, too soon (<30days) to assess goals progression   [] Goals require adjustment due to lack of progress  [] Patient is not progressing as expected and requires additional follow up with physician  [] Other:     TREATMENT PLAN     Frequency/Duration: 2x/week for 6 weeks for the following treatment interventions:    Interventions:  Therapeutic Exercise (07638) including: strength training, ROM, and functional mobility  Therapeutic Activities (11301) including: functional mobility training and education.  Neuromuscular Re-education (58054) activation and proprioception, including postural re-education.    Manual Therapy (96843) as indicated to include: Passive Range of Motion, Gr I-IV mobilizations, Soft Tissue Mobilization, and Dry Needling/IASTM  Modalities as needed that may include: Cryotherapy, Electrical Stimulation, and Biofeedback  Patient education on joint protection, postural re-education, activity modification, and progression of HEP    Plan: Discharge     Electronically Signed by Destiny Bui PT, DPT  Date: 11/20/2024     Note: Portions of this note have been templated and/or copied from initial evaluation, reassessments and prior notes for documentation efficiency.    Note: If patient does not return for scheduled/recommended

## 2025-05-22 ENCOUNTER — OFFICE VISIT (OUTPATIENT)
Dept: ORTHOPEDIC SURGERY | Age: 60
End: 2025-05-22
Payer: COMMERCIAL

## 2025-05-22 DIAGNOSIS — Z96.611 HISTORY OF TOTAL REPLACEMENT OF RIGHT SHOULDER JOINT: Primary | ICD-10-CM

## 2025-05-22 PROCEDURE — 99213 OFFICE O/P EST LOW 20 MIN: CPT | Performed by: PHYSICIAN ASSISTANT

## 2025-05-22 NOTE — PROGRESS NOTES
[facility]  History and Physical  Shoulder Pain    Date:  2025    Name:  Maureen Jade  Address:  [address] [place] [address] [address]  [facility] [address]    :  1965      Age:   59 y.o.    SSN:  [id number]      Medical Record Number:  [id number]    Reason for Visit:    Shoulder Pain (RIGHT SHOULDER)      HPI:   Maureen Jade is a 59 y.o. female who presents to our office today for follow up of the right shoulder pain.  Patient has a history of a right anatomic shoulder arthroplasty performed back in May, 2020.  She did struggle with subscapularis tendinopathy for some time.  Patient reports overall she has continued to work on her strength program on her own.  She reports here in there she has some soreness but is nothing that she has not been able to work through.  Patient overall feels that she is doing well and is able to do the activities.  She has been able to play golf with a full set of clubs.  She denies any interval injuries.      Pain Assessment  Location of Pain: Shoulder  Location Modifiers: Right  Severity of Pain: 0  Quality of Pain: Aching, Dull  Frequency of Pain: Intermittent  Aggravating Factors: Other (Comment), Stretching, Straightening  Limiting Behavior: Yes  Relieving Factors: Rest  Work-Related Injury: No  Are there other pain locations you wish to document?: No    No notes on file    Review of Systems:  A 14 point review of systems available in the scanned medical record as documented by the patient and reviewed on 2025.  The review is negative with the exception of those things mentioned in the History of Present Illness and Past Medical History.      Past Medical History:  Patient's medications, allergies, past medical, surgical, social and family histories were reviewed and updated as appropriate.    Allergies:  No Known Allergies    Physical Exam:  There were no vitals filed for this visit.  General: Maureen Jade is a healthy

## (undated) DEVICE — GLOVE SURG SZ 9 L1185IN FNGR THK75MIL STRW LTX POLYMER BEAD

## (undated) DEVICE — BLANKET WRM W40.2XL55.9IN IORT LO BODY + MISTRAL AIR

## (undated) DEVICE — BLANKET WRM W29.9XL79.1IN UP BODY FORC AIR MISTRAL-AIR

## (undated) DEVICE — 3M™ COBAN™ NL STERILE NON-LATEX SELF-ADHERENT WRAP, 2086S, 6 IN X 5 YD (15 CM X 4,5 M), 12 ROLLS/CASE: Brand: 3M™ COBAN™

## (undated) DEVICE — SUTURE VCRL + SZ 1 L18IN ABSRB UD L36MM CT-1 1/2 CIR VCP841D

## (undated) DEVICE — SYRINGE CATH TIP 50ML

## (undated) DEVICE — HANDPIECE SET WITH SUCTION TUBING: Brand: INTERPULSE

## (undated) DEVICE — SUTURE ETHBND EXCEL SZ 2 L30IN NONABSORBABLE GRN L40MM V-37 MX69G

## (undated) DEVICE — FLUID TRAP FOR MINIVAC ES EQUIP FLD TRAP

## (undated) DEVICE — HIGH FLOW TIP

## (undated) DEVICE — SUTURE VCRL SZ 0 L18IN ABSRB UD L36MM CT-1 1/2 CIR J840D

## (undated) DEVICE — SURE SET-DOUBLE BASIN-LF: Brand: MEDLINE INDUSTRIES, INC.

## (undated) DEVICE — SUTURE MCRYL SZ 4-0 L18IN ABSRB UD L19MM PS-2 3/8 CIR PRIM Y496G

## (undated) DEVICE — SUTURE MCRYL + SZ 4-0 L27IN ABSRB UD L19MM PS-2 3/8 CIR MCP426H

## (undated) DEVICE — 3 BONE CEMENT MIXER: Brand: MIXEVAC

## (undated) DEVICE — GLOVE SURG SZ 9 L12IN FNGR THK79MIL GRN LTX FREE

## (undated) DEVICE — E-Z CLEAN, NON-STICK, PTFE COATED, ELECTROSURGICAL BLADE ELECTRODE, 2.5 INCH (6.35 CM): Brand: EZ CLEAN

## (undated) DEVICE — 3M™ STERI-DRAPE™ U-DRAPE 1015: Brand: STERI-DRAPE™

## (undated) DEVICE — DRAPE 70X60IN SPLIT IMPERV ADHES STRIP

## (undated) DEVICE — STOCKINETTE ORTH W9XL36IN COT 2 PLY HLLW FOR HANDLING LMB

## (undated) DEVICE — Z INACTIVE USE 2660664 SOLUTION IRRIG 3000ML 0.9% SOD CHL USP UROMATIC PLAS CONT

## (undated) DEVICE — PAD,NON-ADHERENT,3X8,STERILE,LF,1/PK: Brand: MEDLINE

## (undated) DEVICE — SPONGE,LAP,18"X18",DLX,XR,ST,5/PK,40/PK: Brand: MEDLINE

## (undated) DEVICE — PROTECTOR ULN NRV PUR FOAM HK LOOP STRP ANATOMICALLY

## (undated) DEVICE — PAD,ABDOMINAL,5"X9",ST,LF,25/BX: Brand: MEDLINE INDUSTRIES, INC.

## (undated) DEVICE — KIT SHLDR STBL MARCO FOR SPIDER LIMB POS

## (undated) DEVICE — ZIMMER® STERILE DISPOSABLE TOURNIQUET CUFF WITH PLC, DUAL PORT, SINGLE BLADDER, 34 IN. (86 CM)

## (undated) DEVICE — SOLUTION IV 1000ML 0.9% SOD CHL

## (undated) DEVICE — JEWISH HOSPITAL TURNOVER KIT: Brand: MEDLINE INDUSTRIES, INC.

## (undated) DEVICE — 60 ML SYRINGE,CATHETER TIP: Brand: MONOJECT

## (undated) DEVICE — STERILE PVP: Brand: MEDLINE INDUSTRIES, INC.

## (undated) DEVICE — DUAL CUT SAGITTAL BLADE

## (undated) DEVICE — BLADE SAW W12.5XL70MM THK0.8MM CUT THK1.12MM S STL RECIP

## (undated) DEVICE — SOLUTION IV IRRIG 0.9% NACL 3000ML BAG 2B7477

## (undated) DEVICE — NEEDLE SPNL L3.5IN PNK HUB S STL REG WALL FIT STYL W/ QNCKE

## (undated) DEVICE — SOLUTION IV IRRIG WATER 1000ML POUR BRL 2F7114

## (undated) DEVICE — DRESSING HYDROFIBER AQUACEL AG ADVANTAGE 3.5X14 IN

## (undated) DEVICE — 3M™ STERI-DRAPE™ U-DRAPE 1067 1067 5/BX 4BX/CS/CTN&#X20;: Brand: STERI-DRAPE™

## (undated) DEVICE — ZIMMER® STERILE DISPOSABLE TOURNIQUET CUFF WITH PLC, DUAL PORT, SINGLE BLADDER, 30 IN. (76 CM)

## (undated) DEVICE — 3M™ IOBAN™ 2 ANTIMICROBIAL INCISE DRAPE 6640EZ: Brand: IOBAN™ 2

## (undated) DEVICE — Device

## (undated) DEVICE — APPLICATOR MEDICATED 26 CC SOLUTION HI LT ORNG CHLORAPREP

## (undated) DEVICE — 1010 S-DRAPE TOWEL DRAPE 10/BX: Brand: STERI-DRAPE™

## (undated) DEVICE — SUTURE STRATAFIX SPRL SZ 1 L14IN ABSRB VLT L48CM CTX 1/2 SXPD2B405

## (undated) DEVICE — NEEDLE SUT SZ 4 MAYO CATGUT 1/2 CIR TAPR PNT DISP

## (undated) DEVICE — SURGICAL SET UP - SURE SET: Brand: MEDLINE INDUSTRIES, INC.

## (undated) DEVICE — COUNTER NDL 40 COUNT HLD 70 NUM FOAM BLK SGL MAG W BLDE REMV

## (undated) DEVICE — INTENDED TO SUPPORT AND MAINTAIN THE POSITION OF AN ANESTHETIZED PATIENT DURING SURGERY: Brand: ERIN BEACH CHAIR FACE MASK

## (undated) DEVICE — TOWEL,OR,DSP,ST,BLUE,DLX,8/PK,10PK/CS: Brand: MEDLINE

## (undated) DEVICE — PAD DRY FLOOR ABS 32X58IN GRN

## (undated) DEVICE — PLATE ES AD W 9FT CRD 2

## (undated) DEVICE — SST BUR, WIRE PASS DRILL, 2 FLUTES, MED., 1.5MM DIA: Brand: MICROAIRE®

## (undated) DEVICE — TOWEL,STOP FLAG GOLD N-W: Brand: MEDLINE

## (undated) DEVICE — GOWN,SIRUS,POLYRNF,BRTHSLV,XL,30/CS: Brand: MEDLINE

## (undated) DEVICE — CARBIDE BUR, OVAL CUTTING, 8 FLUTES, MED., 4MM DIA.: Brand: MICROAIRE®

## (undated) DEVICE — LIQUIBAND RAPID ADHESIVE 36/CS 0.8ML: Brand: MEDLINE

## (undated) DEVICE — 3M™ IOBAN™ 2 ANTIMICROBIAL INCISE DRAPE 6648EZ: Brand: IOBAN™ 2

## (undated) DEVICE — Device: Brand: STABLECUT®

## (undated) DEVICE — TOTAL KNEE: Brand: MEDLINE INDUSTRIES, INC.

## (undated) DEVICE — SUTURE VCRL SZ 2-0 L18IN ABSRB UD CT-1 L36MM 1/2 CIR J839D

## (undated) DEVICE — INTENDED FOR TISSUE SEPARATION, AND OTHER PROCEDURES THAT REQUIRE A SHARP SURGICAL BLADE TO PUNCTURE OR CUT.: Brand: BARD-PARKER ® CARBON RIB-BACK BLADES

## (undated) DEVICE — SST BUR, WIRE PASS DRILL, 2 FLUTES, MED., 2MM DIA.: Brand: MICROAIRE®

## (undated) DEVICE — GARMENT,MEDLINE,DVT,INT,CALF,MED, GEN2: Brand: MEDLINE

## (undated) DEVICE — SOLUTION IRRIG 1000ML STRL H2O USP PLAS POUR BTL

## (undated) DEVICE — SUTURE VCRL + SZ 0 L18IN ABSRB UD L36MM CT-1 1/2 CIR VCP840D

## (undated) DEVICE — PEEL-AWAY HOOD: Brand: FLYTE, SURGICOOL

## (undated) DEVICE — GLOVE SURG SZ 8 L12IN FNGR THK75MIL WHT LTX POLYMER BEAD

## (undated) DEVICE — COVER,TABLE,HEAVY DUTY,77"X90",STRL: Brand: MEDLINE

## (undated) DEVICE — PENCIL SMK EVAC TELSCP 3 M TBNG

## (undated) DEVICE — COVER LT HNDL BLU PLAS

## (undated) DEVICE — SYSTEM SKIN CLSR 22CM DERMBND PRINEO

## (undated) DEVICE — 3M™ MEDIPORE™ SOFT CLOTH TAPE, 4 INCH X 10 YARDS, 12 ROLLS/CASE, 2964: Brand: 3M™ MEDIPORE™

## (undated) DEVICE — EYE PROTECTOR FOAM MEDICHOICE

## (undated) DEVICE — ELECTROSURGICAL PENCIL ROCKER SWITCH NON COATED BLADE ELECTRODE 10 FT (3 M) CORD HOLSTER: Brand: MEGADYNE

## (undated) DEVICE — UNDERGLOVE SURG SZ 8 BLU LTX FREE SYN POLYISOPRENE POLYMER

## (undated) DEVICE — DBD-PACK,SHOULDER III: Brand: MEDLINE

## (undated) DEVICE — YANKAUER,OPEN TIP,W/O VENT,STERILE: Brand: MEDLINE INDUSTRIES, INC.

## (undated) DEVICE — GOWN,SIRUS,POLYRNF,BRTHSLV,XLN/XXL,18/CS: Brand: MEDLINE